# Patient Record
Sex: FEMALE | Race: WHITE | NOT HISPANIC OR LATINO | Employment: FULL TIME | URBAN - METROPOLITAN AREA
[De-identification: names, ages, dates, MRNs, and addresses within clinical notes are randomized per-mention and may not be internally consistent; named-entity substitution may affect disease eponyms.]

---

## 2023-04-13 ENCOUNTER — TELEPHONE (OUTPATIENT)
Dept: PRIMARY CARE | Facility: CLINIC | Age: 63
End: 2023-04-13
Payer: COMMERCIAL

## 2023-04-13 NOTE — TELEPHONE ENCOUNTER
Patient had an referral for Colonoscopy that . She also has a Cologuard Kit that she never sent in. Do you have a preference for which one she should do?

## 2023-04-14 NOTE — TELEPHONE ENCOUNTER
Patient is concerned with rapid weight loss (12 lbs in a month) , orange colored urine, and loss of appetite/nausea. She has a physical scheduled for 5.04.23, but would like to know if you think she should come in sooner.

## 2023-04-20 ENCOUNTER — LAB (OUTPATIENT)
Dept: LAB | Facility: LAB | Age: 63
End: 2023-04-20
Payer: COMMERCIAL

## 2023-04-20 ENCOUNTER — OFFICE VISIT (OUTPATIENT)
Dept: PRIMARY CARE | Facility: CLINIC | Age: 63
End: 2023-04-20
Payer: COMMERCIAL

## 2023-04-20 VITALS
TEMPERATURE: 97.7 F | OXYGEN SATURATION: 97 % | WEIGHT: 197 LBS | DIASTOLIC BLOOD PRESSURE: 90 MMHG | HEIGHT: 68 IN | BODY MASS INDEX: 29.86 KG/M2 | HEART RATE: 72 BPM | SYSTOLIC BLOOD PRESSURE: 134 MMHG

## 2023-04-20 DIAGNOSIS — R63.4 WEIGHT LOSS, UNINTENTIONAL: ICD-10-CM

## 2023-04-20 DIAGNOSIS — R10.11 RUQ ABDOMINAL PAIN: ICD-10-CM

## 2023-04-20 DIAGNOSIS — R73.01 IMPAIRED FASTING GLUCOSE: ICD-10-CM

## 2023-04-20 DIAGNOSIS — E03.9 ACQUIRED HYPOTHYROIDISM: ICD-10-CM

## 2023-04-20 DIAGNOSIS — R39.89 ABNORMAL URINE COLOR: Primary | ICD-10-CM

## 2023-04-20 PROBLEM — F41.1 GENERALIZED ANXIETY DISORDER: Status: ACTIVE | Noted: 2023-04-20

## 2023-04-20 PROBLEM — F90.9 ADHD (ATTENTION DEFICIT HYPERACTIVITY DISORDER): Status: ACTIVE | Noted: 2023-04-20

## 2023-04-20 PROBLEM — E55.9 VITAMIN D DEFICIENCY: Status: ACTIVE | Noted: 2023-04-20

## 2023-04-20 PROBLEM — G47.00 INSOMNIA: Status: ACTIVE | Noted: 2023-04-20

## 2023-04-20 PROBLEM — R41.3 MEMORY DEFICITS: Status: ACTIVE | Noted: 2023-04-20

## 2023-04-20 PROBLEM — F33.9 DEPRESSION, RECURRENT (CMS-HCC): Status: ACTIVE | Noted: 2023-04-20

## 2023-04-20 PROBLEM — M17.12 OSTEOARTHRITIS OF LEFT PATELLOFEMORAL JOINT: Status: ACTIVE | Noted: 2023-04-20

## 2023-04-20 PROBLEM — Z98.84 S/P BARIATRIC SURGERY: Status: ACTIVE | Noted: 2023-04-20

## 2023-04-20 PROBLEM — M25.511 PAIN IN JOINT OF RIGHT SHOULDER: Status: ACTIVE | Noted: 2023-04-20

## 2023-04-20 PROBLEM — M54.41 RIGHT-SIDED LOW BACK PAIN WITH SCIATICA: Status: ACTIVE | Noted: 2023-04-20

## 2023-04-20 PROBLEM — K21.9 GERD (GASTROESOPHAGEAL REFLUX DISEASE): Status: ACTIVE | Noted: 2023-04-20

## 2023-04-20 PROBLEM — E78.5 HYPERLIPIDEMIA: Status: ACTIVE | Noted: 2023-04-20

## 2023-04-20 PROBLEM — M65.30 TRIGGER FINGER, ACQUIRED: Status: ACTIVE | Noted: 2023-04-20

## 2023-04-20 LAB
ALANINE AMINOTRANSFERASE (SGPT) (U/L) IN SER/PLAS: 114 U/L (ref 7–45)
ALBUMIN (G/DL) IN SER/PLAS: 4.4 G/DL (ref 3.4–5)
ALKALINE PHOSPHATASE (U/L) IN SER/PLAS: 315 U/L (ref 33–136)
ANION GAP IN SER/PLAS: 12 MMOL/L (ref 10–20)
ASPARTATE AMINOTRANSFERASE (SGOT) (U/L) IN SER/PLAS: 41 U/L (ref 9–39)
BASOPHILS (10*3/UL) IN BLOOD BY AUTOMATED COUNT: 0.06 X10E9/L (ref 0–0.1)
BASOPHILS/100 LEUKOCYTES IN BLOOD BY AUTOMATED COUNT: 0.7 % (ref 0–2)
BILIRUBIN TOTAL (MG/DL) IN SER/PLAS: 1.3 MG/DL (ref 0–1.2)
CALCIUM (MG/DL) IN SER/PLAS: 10.5 MG/DL (ref 8.6–10.6)
CARBON DIOXIDE, TOTAL (MMOL/L) IN SER/PLAS: 30 MMOL/L (ref 21–32)
CHLORIDE (MMOL/L) IN SER/PLAS: 100 MMOL/L (ref 98–107)
CREATININE (MG/DL) IN SER/PLAS: 0.68 MG/DL (ref 0.5–1.05)
EOSINOPHILS (10*3/UL) IN BLOOD BY AUTOMATED COUNT: 0.95 X10E9/L (ref 0–0.7)
EOSINOPHILS/100 LEUKOCYTES IN BLOOD BY AUTOMATED COUNT: 10.6 % (ref 0–6)
ERYTHROCYTE DISTRIBUTION WIDTH (RATIO) BY AUTOMATED COUNT: 14.9 % (ref 11.5–14.5)
ERYTHROCYTE MEAN CORPUSCULAR HEMOGLOBIN CONCENTRATION (G/DL) BY AUTOMATED: 32.1 G/DL (ref 32–36)
ERYTHROCYTE MEAN CORPUSCULAR VOLUME (FL) BY AUTOMATED COUNT: 95 FL (ref 80–100)
ERYTHROCYTES (10*6/UL) IN BLOOD BY AUTOMATED COUNT: 4.78 X10E12/L (ref 4–5.2)
GFR FEMALE: >90 ML/MIN/1.73M2
GLUCOSE (MG/DL) IN SER/PLAS: 92 MG/DL (ref 74–99)
HEMATOCRIT (%) IN BLOOD BY AUTOMATED COUNT: 45.2 % (ref 36–46)
HEMOGLOBIN (G/DL) IN BLOOD: 14.5 G/DL (ref 12–16)
IMMATURE GRANULOCYTES/100 LEUKOCYTES IN BLOOD BY AUTOMATED COUNT: 0.4 % (ref 0–0.9)
LEUKOCYTES (10*3/UL) IN BLOOD BY AUTOMATED COUNT: 9 X10E9/L (ref 4.4–11.3)
LYMPHOCYTES (10*3/UL) IN BLOOD BY AUTOMATED COUNT: 2.32 X10E9/L (ref 1.2–4.8)
LYMPHOCYTES/100 LEUKOCYTES IN BLOOD BY AUTOMATED COUNT: 25.8 % (ref 13–44)
MONOCYTES (10*3/UL) IN BLOOD BY AUTOMATED COUNT: 0.67 X10E9/L (ref 0.1–1)
MONOCYTES/100 LEUKOCYTES IN BLOOD BY AUTOMATED COUNT: 7.4 % (ref 2–10)
NEUTROPHILS (10*3/UL) IN BLOOD BY AUTOMATED COUNT: 4.96 X10E9/L (ref 1.2–7.7)
NEUTROPHILS/100 LEUKOCYTES IN BLOOD BY AUTOMATED COUNT: 55.1 % (ref 40–80)
NRBC (PER 100 WBCS) BY AUTOMATED COUNT: 0 /100 WBC (ref 0–0)
PLATELETS (10*3/UL) IN BLOOD AUTOMATED COUNT: 388 X10E9/L (ref 150–450)
POC APPEARANCE, URINE: CLEAR
POC BILIRUBIN, URINE: NEGATIVE
POC BLOOD, URINE: NEGATIVE
POC COLOR, URINE: YELLOW
POC GLUCOSE, URINE: NEGATIVE MG/DL
POC HEMOGLOBIN A1C: 5.2 % (ref 4.2–6.5)
POC KETONES, URINE: NEGATIVE MG/DL
POC LEUKOCYTES, URINE: NEGATIVE
POC NITRITE,URINE: NEGATIVE
POC PH, URINE: 7 PH
POC PROTEIN, URINE: NEGATIVE MG/DL
POC SPECIFIC GRAVITY, URINE: 1.02
POC UROBILINOGEN, URINE: 2 EU/DL
POTASSIUM (MMOL/L) IN SER/PLAS: 4.5 MMOL/L (ref 3.5–5.3)
PROTEIN TOTAL: 6.9 G/DL (ref 6.4–8.2)
SODIUM (MMOL/L) IN SER/PLAS: 137 MMOL/L (ref 136–145)
THYROTROPIN (MIU/L) IN SER/PLAS BY DETECTION LIMIT <= 0.05 MIU/L: 3.49 MIU/L (ref 0.44–3.98)
UREA NITROGEN (MG/DL) IN SER/PLAS: 9 MG/DL (ref 6–23)

## 2023-04-20 PROCEDURE — 99214 OFFICE O/P EST MOD 30 MIN: CPT | Performed by: FAMILY MEDICINE

## 2023-04-20 PROCEDURE — 85025 COMPLETE CBC W/AUTO DIFF WBC: CPT

## 2023-04-20 PROCEDURE — 83036 HEMOGLOBIN GLYCOSYLATED A1C: CPT | Performed by: FAMILY MEDICINE

## 2023-04-20 PROCEDURE — 1036F TOBACCO NON-USER: CPT | Performed by: FAMILY MEDICINE

## 2023-04-20 PROCEDURE — 36415 COLL VENOUS BLD VENIPUNCTURE: CPT

## 2023-04-20 PROCEDURE — 81003 URINALYSIS AUTO W/O SCOPE: CPT | Performed by: FAMILY MEDICINE

## 2023-04-20 PROCEDURE — 80053 COMPREHEN METABOLIC PANEL: CPT

## 2023-04-20 PROCEDURE — 84443 ASSAY THYROID STIM HORMONE: CPT

## 2023-04-20 RX ORDER — TRAZODONE HYDROCHLORIDE 100 MG/1
TABLET ORAL
COMMUNITY
Start: 2015-12-03 | End: 2024-02-19

## 2023-04-20 RX ORDER — VIT C/E/ZN/COPPR/LUTEIN/ZEAXAN 250MG-90MG
1 CAPSULE ORAL DAILY
COMMUNITY

## 2023-04-20 RX ORDER — DEXTROAMPHETAMINE SACCHARATE, AMPHETAMINE ASPARTATE MONOHYDRATE, DEXTROAMPHETAMINE SULFATE AND AMPHETAMINE SULFATE 5; 5; 5; 5 MG/1; MG/1; MG/1; MG/1
20 CAPSULE, EXTENDED RELEASE ORAL DAILY
COMMUNITY
End: 2023-11-18 | Stop reason: WASHOUT

## 2023-04-20 RX ORDER — MAGNESIUM CARB/ALUMINUM HYDROX 105-160MG
TABLET,CHEWABLE ORAL
COMMUNITY
Start: 2022-07-25

## 2023-04-20 RX ORDER — PYRIDOXINE HCL (VITAMIN B6) 25 MG
1 TABLET ORAL DAILY
COMMUNITY

## 2023-04-20 RX ORDER — PNV NO.95/FERROUS FUM/FOLIC AC 28MG-0.8MG
1 TABLET ORAL DAILY
COMMUNITY

## 2023-04-20 RX ORDER — BUPROPION HYDROCHLORIDE 150 MG/1
1 TABLET ORAL DAILY
COMMUNITY
Start: 2022-10-19 | End: 2023-10-09

## 2023-04-20 RX ORDER — LEVOTHYROXINE SODIUM 50 UG/1
1 TABLET ORAL DAILY
COMMUNITY
Start: 2022-05-26

## 2023-04-20 RX ORDER — FLUTICASONE PROPIONATE 50 MCG
2 SPRAY, SUSPENSION (ML) NASAL DAILY
COMMUNITY
Start: 2021-10-25 | End: 2024-02-02 | Stop reason: WASHOUT

## 2023-04-20 RX ORDER — OMEPRAZOLE 40 MG/1
CAPSULE, DELAYED RELEASE ORAL
COMMUNITY
Start: 2021-11-08 | End: 2023-04-20 | Stop reason: ALTCHOICE

## 2023-04-20 RX ORDER — BUPROPION HYDROCHLORIDE 300 MG/1
1 TABLET ORAL DAILY
COMMUNITY
Start: 2022-10-19 | End: 2023-10-09

## 2023-04-20 RX ORDER — SODIUM FLUORIDE 5 MG/G
GEL, DENTIFRICE DENTAL
COMMUNITY
Start: 2021-10-04

## 2023-04-20 RX ORDER — AZELASTINE 1 MG/ML
SPRAY, METERED NASAL 2 TIMES DAILY
COMMUNITY
Start: 2021-10-25 | End: 2024-01-26 | Stop reason: WASHOUT

## 2023-04-20 RX ORDER — PANTOPRAZOLE SODIUM 40 MG/1
40 TABLET, DELAYED RELEASE ORAL DAILY
Qty: 30 TABLET | Refills: 1 | Status: SHIPPED | OUTPATIENT
Start: 2023-04-20 | End: 2024-01-26 | Stop reason: WASHOUT

## 2023-04-20 RX ORDER — CLONIDINE HYDROCHLORIDE 0.2 MG/1
2 TABLET ORAL NIGHTLY
COMMUNITY
Start: 2022-03-15 | End: 2023-12-18

## 2023-04-20 ASSESSMENT — PATIENT HEALTH QUESTIONNAIRE - PHQ9
2. FEELING DOWN, DEPRESSED OR HOPELESS: NOT AT ALL
1. LITTLE INTEREST OR PLEASURE IN DOING THINGS: NOT AT ALL
SUM OF ALL RESPONSES TO PHQ9 QUESTIONS 1 AND 2: 0

## 2023-04-20 ASSESSMENT — ENCOUNTER SYMPTOMS
COUGH: 0
DEPRESSION: 0
SHORTNESS OF BREATH: 0
PALPITATIONS: 0
FATIGUE: 0
UNEXPECTED WEIGHT CHANGE: 1
LOSS OF SENSATION IN FEET: 0
ABDOMINAL PAIN: 1
OCCASIONAL FEELINGS OF UNSTEADINESS: 0

## 2023-04-20 ASSESSMENT — LIFESTYLE VARIABLES: HOW OFTEN DO YOU HAVE A DRINK CONTAINING ALCOHOL: 2-3 TIMES A WEEK

## 2023-04-20 NOTE — PROGRESS NOTES
"Subjective   Patient ID: Mandy Ramirez is a 62 y.o. female who presents for Weight Loss (Dark urine, loss of appetite ).    Mandy is here because she has not been feeling well.  She started losing weight in September and has lost almost 20 pounds.  She reports in late February she has had a decreased appetite.  She was feeling nauseated all the time.  About a month ago she developed dark urine and loose stool.  She has a history of GERD and takes omeprazole.  Over the last few days she has been feeling better.  She has had dry mouth and has been drinking a lot of water.          Review of Systems   Constitutional:  Positive for unexpected weight change. Negative for fatigue.   Respiratory:  Negative for cough and shortness of breath.    Cardiovascular:  Negative for chest pain and palpitations.   Gastrointestinal:  Positive for abdominal pain (diffuse).       Objective     /90   Pulse 72   Temp 36.5 °C (97.7 °F)   Ht 1.727 m (5' 8\")   Wt 89.4 kg (197 lb)   SpO2 97%   BMI 29.95 kg/m²     Physical Exam  Constitutional:       General: She is not in acute distress.  Neck:      Thyroid: No thyromegaly.   Cardiovascular:      Rate and Rhythm: Normal rate and regular rhythm.      Heart sounds: No murmur heard.  Pulmonary:      Effort: No respiratory distress.      Breath sounds: Normal breath sounds.   Abdominal:      General: There is no distension.      Palpations: There is no mass.      Tenderness: There is abdominal tenderness (Diffuse but mostly right upper quadrant). There is no guarding or rebound.   Lymphadenopathy:      Cervical: No cervical adenopathy.   Neurological:      Mental Status: She is alert.             Assessment/Plan   Problem List Items Addressed This Visit          Endocrine/Metabolic    Acquired hypothyroidism    Relevant Orders    Tsh With Reflex To Free T4 If Abnormal    Impaired fasting glucose    Relevant Orders    POCT glycosylated hemoglobin (Hb A1C) manually resulted " (Completed)     Other Visit Diagnoses       Abnormal urine color    -  Primary    Relevant Orders    POCT UA Automated manually resulted (Completed)    Weight loss, unintentional        Relevant Orders    Comprehensive metabolic panel    CBC and Auto Differential    RUQ abdominal pain        Relevant Medications    pantoprazole (ProtoNix) 40 mg EC tablet    Other Relevant Orders    US abdomen limited liver

## 2023-04-24 ENCOUNTER — TELEPHONE (OUTPATIENT)
Dept: PRIMARY CARE | Facility: CLINIC | Age: 63
End: 2023-04-24
Payer: COMMERCIAL

## 2023-04-24 DIAGNOSIS — K80.20 GALLSTONES: Primary | ICD-10-CM

## 2023-04-24 NOTE — TELEPHONE ENCOUNTER
Result Communication    Resulted Orders   US abdomen limited liver    Narrative    Interpreted By:  BELEN LEWIS MD  MRN: [id number]  Patient Name: LIVAN VELAZQUEZ     STUDY:  US LIVER;  4/24/2023 9:05 am     INDICATION:  Right upper quadrant abdominal pain, dark stool.     COMPARISON:  None.     ACCESSION NUMBER(S):  [id number]     ORDERING CLINICIAN:  SERGIO IGLESIAS     TECHNIQUE:  Grayscale and color Doppler ultrasound evaluation of the right upper  quadrant.     FINDINGS:  LIVER:  The right hepatic lobe measures 24.8 cm in length. Suspected nodular  hepatic contour. No focal abnormality.     GALLBLADDER:  Filled with shadowing hyperechoic gallstones. No wall thickening or  pericholecystic fluid. No focal tenderness is reported during  scanning directly over the gallbladder.     BILIARY TREE:  No intra- or extrahepatic biliary dilatation. The extrahepatic bile  duct measures 8 mm.     PANCREAS:  The visualized head and neck are within normal limits. The body and  tail are obscured by shadowing from bowel gas.     RIGHT KIDNEY:  Normal size, no hydronephrosis.     PERITONEUM:  No ascites.       Impression    The liver is enlarged with a suspected nodular contour suggestive of  underlying chronic liver disease. Further clinical evaluation is  suggested.     The gallbladder is filled with gallstones, however no specific  findings to suggest presence of acute cholecystitis.     Mild dilation of the extrahepatic bile duct up to 8 mm;  choledocholithiasis is not excluded on the basis of this appearance.       1:14 PM      Results were successfully communicated with the patient and they acknowledged their understanding.  She must see surgery, referral placed.  Her liver enzymes are elevated which is likely related to the gallstones.

## 2023-05-02 ENCOUNTER — APPOINTMENT (OUTPATIENT)
Dept: PRIMARY CARE | Facility: CLINIC | Age: 63
End: 2023-05-02
Payer: COMMERCIAL

## 2023-05-04 ENCOUNTER — OFFICE VISIT (OUTPATIENT)
Dept: PRIMARY CARE | Facility: CLINIC | Age: 63
End: 2023-05-04
Payer: COMMERCIAL

## 2023-05-04 VITALS
BODY MASS INDEX: 30.01 KG/M2 | HEART RATE: 85 BPM | DIASTOLIC BLOOD PRESSURE: 86 MMHG | WEIGHT: 198 LBS | OXYGEN SATURATION: 94 % | SYSTOLIC BLOOD PRESSURE: 128 MMHG | TEMPERATURE: 95.7 F | HEIGHT: 68 IN

## 2023-05-04 DIAGNOSIS — Z12.11 SCREENING FOR COLORECTAL CANCER: ICD-10-CM

## 2023-05-04 DIAGNOSIS — Z00.00 HEALTHCARE MAINTENANCE: Primary | ICD-10-CM

## 2023-05-04 DIAGNOSIS — Z11.59 NEED FOR HEPATITIS C SCREENING TEST: ICD-10-CM

## 2023-05-04 DIAGNOSIS — Z12.12 SCREENING FOR COLORECTAL CANCER: ICD-10-CM

## 2023-05-04 DIAGNOSIS — Z12.31 ENCOUNTER FOR SCREENING MAMMOGRAM FOR BREAST CANCER: ICD-10-CM

## 2023-05-04 PROBLEM — R05.3 CHRONIC COUGH: Status: ACTIVE | Noted: 2023-05-04

## 2023-05-04 PROBLEM — K80.20 CHOLELITHIASIS: Status: ACTIVE | Noted: 2023-05-04

## 2023-05-04 PROCEDURE — 3074F SYST BP LT 130 MM HG: CPT | Performed by: FAMILY MEDICINE

## 2023-05-04 PROCEDURE — 1036F TOBACCO NON-USER: CPT | Performed by: FAMILY MEDICINE

## 2023-05-04 PROCEDURE — 99396 PREV VISIT EST AGE 40-64: CPT | Performed by: FAMILY MEDICINE

## 2023-05-04 PROCEDURE — 3079F DIAST BP 80-89 MM HG: CPT | Performed by: FAMILY MEDICINE

## 2023-05-04 ASSESSMENT — ENCOUNTER SYMPTOMS
VOMITING: 1
FREQUENCY: 0
COUGH: 1
SORE THROAT: 0
RHINORRHEA: 0
DIZZINESS: 0
DIARRHEA: 1
NERVOUS/ANXIOUS: 1
WEAKNESS: 0
APPETITE CHANGE: 1
BACK PAIN: 0
DYSPHORIC MOOD: 1
DYSURIA: 0
HEADACHES: 0
UNEXPECTED WEIGHT CHANGE: 0
MYALGIAS: 0
BLOOD IN STOOL: 0
FATIGUE: 0
EYE PAIN: 0
SHORTNESS OF BREATH: 0
NAUSEA: 1
NUMBNESS: 0
PALPITATIONS: 0
ABDOMINAL PAIN: 1
ARTHRALGIAS: 0

## 2023-05-04 ASSESSMENT — LIFESTYLE VARIABLES
HOW MANY STANDARD DRINKS CONTAINING ALCOHOL DO YOU HAVE ON A TYPICAL DAY: 1 OR 2
HOW OFTEN DO YOU HAVE A DRINK CONTAINING ALCOHOL: 2-3 TIMES A WEEK
AUDIT-C TOTAL SCORE: 3
HOW OFTEN DO YOU HAVE SIX OR MORE DRINKS ON ONE OCCASION: NEVER
SKIP TO QUESTIONS 9-10: 1

## 2023-05-04 ASSESSMENT — PATIENT HEALTH QUESTIONNAIRE - PHQ9
1. LITTLE INTEREST OR PLEASURE IN DOING THINGS: NOT AT ALL
SUM OF ALL RESPONSES TO PHQ9 QUESTIONS 1 & 2: 0
2. FEELING DOWN, DEPRESSED OR HOPELESS: NOT AT ALL

## 2023-05-04 NOTE — PROGRESS NOTES
"Subjective   Patient ID: Mandy Ramirez is a 62 y.o. female who presents for Annual Exam.    Harry is here for her physical.    Diet:  Healthy  Exercise:  Walking and stretches  Sleep:  OK  Stress:  High    Dentist:  Sees regularly  Eye doctor:  Sees regularly    Last Pap:  3/24/2020, normal with negative HPV  History of abnormal Pap:  Mammogram:  2022  Colorectal cancer screening:    DEXA scan:  2022, moderate fracture risk, repeat 2 years  Vaccines due?  UTD    Sexually active:  Not really    Menopause symptoms:  Occasional hot flushes  Urinary leakage:  Occasional        Review of Systems   Constitutional:  Positive for appetite change. Negative for fatigue and unexpected weight change.   HENT:  Negative for congestion, ear pain, rhinorrhea and sore throat.    Eyes:  Negative for pain and visual disturbance.   Respiratory:  Positive for cough (chronic). Negative for shortness of breath.    Cardiovascular:  Negative for chest pain and palpitations.   Gastrointestinal:  Positive for abdominal pain, diarrhea (sometimes), nausea and vomiting. Negative for blood in stool.   Genitourinary:  Negative for dysuria, frequency, vaginal bleeding and vaginal discharge.   Musculoskeletal:  Negative for arthralgias, back pain and myalgias.   Skin:  Negative for rash.   Neurological:  Negative for dizziness, weakness, numbness and headaches.   Psychiatric/Behavioral:  Positive for dysphoric mood. The patient is nervous/anxious.        Objective     /86   Pulse 85   Temp 35.4 °C (95.7 °F)   Ht 1.727 m (5' 8\")   Wt 89.8 kg (198 lb)   SpO2 94%   BMI 30.11 kg/m²     Physical Exam  Constitutional:       General: She is not in acute distress.  HENT:      Right Ear: Tympanic membrane normal.      Left Ear: Tympanic membrane normal.   Neck:      Thyroid: No thyromegaly.   Cardiovascular:      Rate and Rhythm: Normal rate and regular rhythm.      Heart sounds: No murmur heard.  Pulmonary:      Effort: No " respiratory distress.      Breath sounds: Normal breath sounds.   Chest:   Breasts:     Breasts are symmetrical.      Right: No mass, nipple discharge, skin change or tenderness.      Left: No mass, nipple discharge, skin change or tenderness.   Abdominal:      General: Bowel sounds are normal. There is no distension.      Palpations: Abdomen is soft. There is no hepatomegaly or splenomegaly.      Tenderness: There is no abdominal tenderness.   Musculoskeletal:         General: No swelling or tenderness.   Lymphadenopathy:      Cervical: No cervical adenopathy.      Upper Body:      Right upper body: No axillary adenopathy.      Left upper body: No axillary adenopathy.   Skin:     General: Skin is warm and dry.      Coloration: Skin is not jaundiced.      Findings: No rash.   Neurological:      General: No focal deficit present.      Mental Status: She is alert and oriented to person, place, and time.   Psychiatric:         Mood and Affect: Mood normal.         Behavior: Behavior normal.             Assessment/Plan   Problem List Items Addressed This Visit    None  Visit Diagnoses       Healthcare maintenance    -  Primary    Screening for colorectal cancer        Relevant Orders    Colonoscopy    Encounter for screening mammogram for breast cancer        Relevant Orders    BI mammo bilateral screening tomosynthesis    Need for hepatitis C screening test        Relevant Orders    Hepatitis C antibody

## 2023-05-04 NOTE — PATIENT INSTRUCTIONS
"Thank you for coming in to see me today, and congratulations on paying attention to staying healthy!    The single most important factor in staying healthy is eating a healthy diet.  Research has shown that the healthiest diet for humans is one rich in whole fresh plant foods.  This means most of what you eat should be fresh fruits and vegetables, whole grains, beans, nuts and seeds.  Adding meat, dairy foods and eggs does not make your food healthier (although it may make it taste better!) so you should work to minimize the amount of beef, chicken, pork, turkey, lamb, cheese and eggs you eat.  Fatty fish like salmon and tuna may be healthier alternatives.  If you would like more information please check out the website \"[place] [employer],\" and the books \"The Blue Zones\" by Doug Saucedo and \"Engine 2 Diet\" by Christopher Lai.    I don't like recommending \"exercise\" because that has unpleasant connotations of pain and being out of breath for many people.  Instead, I encourage my patients to find a way to move your body that you LOVE and would want to do even if it were bad for you!  Playing a fun sport like pickleball, doing yoga or kimberly chi, studying martial arts, learning to dance, even chasing your kids or grandkids around the backyard are great examples of activity that makes your heart healthier.  Remember, if you don't like it, don't do it!  There are plenty of fun options, pick one and try it out!  Aim for at least 30 minutes of activity that gets your heart revved up, most days per week.    We discussed some screening tests for you today, these tests are meant to find problems before they make you sick, when they are easier to treat and less likely to impact your health.  Depending on your age and stage of life they may include blood tests, a Pap test, a mammogram, a bone density test, a colonoscopy and others.  If you have questions later about these or other recommended tests please call and ask!    There are " a number of other things you can do to improve your health.  These may include things like   Increasing the amount of water you drink every day (aim for 1 oz of water for every 2 pounds of body weight, up to about 100 oz total)  Getting 7-8 oz of sleep every night  Avoiding smoking, drinking alcohol, and using recreational drugs    Stress management is also a very important component of staying healthy.  One of the most effective stress management tools is meditation.  I recommend everyone consider proper training in meditation - it isn't just sitting with your eyes closed and breathing.  You can find a training program in your area at [email or web address] or [employer]org.    An annual physical is a great measure to keep you as healthy as you possibly can be.  Good for you for getting that done!  See you next year :-)

## 2023-05-12 DIAGNOSIS — R10.11 RUQ ABDOMINAL PAIN: ICD-10-CM

## 2023-05-12 RX ORDER — PANTOPRAZOLE SODIUM 40 MG/1
TABLET, DELAYED RELEASE ORAL
Qty: 30 TABLET | Refills: 1 | OUTPATIENT
Start: 2023-05-12

## 2023-06-01 ENCOUNTER — TELEPHONE (OUTPATIENT)
Dept: PRIMARY CARE | Facility: CLINIC | Age: 63
End: 2023-06-01
Payer: COMMERCIAL

## 2023-06-01 DIAGNOSIS — R10.11 RUQ ABDOMINAL PAIN: ICD-10-CM

## 2023-06-01 LAB
ALANINE AMINOTRANSFERASE (SGPT) (U/L) IN SER/PLAS: 14 U/L (ref 7–45)
ALBUMIN (G/DL) IN SER/PLAS: 4.3 G/DL (ref 3.4–5)
ALKALINE PHOSPHATASE (U/L) IN SER/PLAS: 85 U/L (ref 33–136)
ALPHA 1 ANTITRYPSIN (MG/DL) IN SER/PLAS: 159 MG/DL (ref 84–218)
ALPHA-1 FETOPROTEIN (NG/ML) IN SER/PLAS: 7 NG/ML (ref 0–9)
ANION GAP IN SER/PLAS: 12 MMOL/L (ref 10–20)
ASPARTATE AMINOTRANSFERASE (SGOT) (U/L) IN SER/PLAS: 19 U/L (ref 9–39)
BASOPHILS (10*3/UL) IN BLOOD BY AUTOMATED COUNT: 0.05 X10E9/L (ref 0–0.1)
BASOPHILS/100 LEUKOCYTES IN BLOOD BY AUTOMATED COUNT: 0.7 % (ref 0–2)
BILIRUBIN TOTAL (MG/DL) IN SER/PLAS: 0.7 MG/DL (ref 0–1.2)
CALCIUM (MG/DL) IN SER/PLAS: 9.3 MG/DL (ref 8.6–10.3)
CARBON DIOXIDE, TOTAL (MMOL/L) IN SER/PLAS: 27 MMOL/L (ref 21–32)
CHLORIDE (MMOL/L) IN SER/PLAS: 104 MMOL/L (ref 98–107)
CREATININE (MG/DL) IN SER/PLAS: 0.85 MG/DL (ref 0.5–1.05)
DEAMIDATED GLIADIN PEPTIDE IGA: <1 U/ML (ref 0–14)
DEAMIDATED GLIADIN PEPTIDE IGG: <1 U/ML (ref 0–14)
EOSINOPHILS (10*3/UL) IN BLOOD BY AUTOMATED COUNT: 0.95 X10E9/L (ref 0–0.7)
EOSINOPHILS/100 LEUKOCYTES IN BLOOD BY AUTOMATED COUNT: 12.7 % (ref 0–6)
ERYTHROCYTE DISTRIBUTION WIDTH (RATIO) BY AUTOMATED COUNT: 14.5 % (ref 11.5–14.5)
ERYTHROCYTE MEAN CORPUSCULAR HEMOGLOBIN CONCENTRATION (G/DL) BY AUTOMATED: 33.6 G/DL (ref 32–36)
ERYTHROCYTE MEAN CORPUSCULAR VOLUME (FL) BY AUTOMATED COUNT: 92 FL (ref 80–100)
ERYTHROCYTES (10*6/UL) IN BLOOD BY AUTOMATED COUNT: 4.74 X10E12/L (ref 4–5.2)
FERRITIN (UG/LL) IN SER/PLAS: 257 UG/L (ref 8–150)
GFR FEMALE: 77 ML/MIN/1.73M2
GLUCOSE (MG/DL) IN SER/PLAS: 101 MG/DL (ref 74–99)
HEMATOCRIT (%) IN BLOOD BY AUTOMATED COUNT: 43.5 % (ref 36–46)
HEMOGLOBIN (G/DL) IN BLOOD: 14.6 G/DL (ref 12–16)
HEPATITIS A VIRUS IGM AB PRESENCE IN SER/PLAS BY IMMUNOASSAY: NONREACTIVE
HEPATITIS B VIRUS CORE IGM AB PRESENCE IN SER/PLAS BY IMMUNOASSY: NONREACTIVE
HEPATITIS B VIRUS SURFACE AG PRESENCE IN SERUM: NONREACTIVE
HEPATITIS C VIRUS AB PRESENCE IN SERUM: NONREACTIVE
HIV 1/ 2 AG/AB SCREEN: NONREACTIVE
IGA (MG/DL) IN SER/PLAS: 146 MG/DL (ref 70–400)
IGG (MG/DL) IN SER/PLAS: 848 MG/DL (ref 700–1600)
IGM (MG/DL) IN SER/PLAS: 41 MG/DL (ref 40–230)
IMMATURE GRANULOCYTES/100 LEUKOCYTES IN BLOOD BY AUTOMATED COUNT: 0.1 % (ref 0–0.9)
IRON (UG/DL) IN SER/PLAS: 60 UG/DL (ref 35–150)
IRON BINDING CAPACITY (UG/DL) IN SER/PLAS: 240 UG/DL (ref 240–445)
IRON SATURATION (%) IN SER/PLAS: 25 % (ref 25–45)
LEUKOCYTES (10*3/UL) IN BLOOD BY AUTOMATED COUNT: 7.5 X10E9/L (ref 4.4–11.3)
LYMPHOCYTES (10*3/UL) IN BLOOD BY AUTOMATED COUNT: 1.91 X10E9/L (ref 1.2–4.8)
LYMPHOCYTES/100 LEUKOCYTES IN BLOOD BY AUTOMATED COUNT: 25.6 % (ref 13–44)
MONOCYTES (10*3/UL) IN BLOOD BY AUTOMATED COUNT: 0.64 X10E9/L (ref 0.1–1)
MONOCYTES/100 LEUKOCYTES IN BLOOD BY AUTOMATED COUNT: 8.6 % (ref 2–10)
NEUTROPHILS (10*3/UL) IN BLOOD BY AUTOMATED COUNT: 3.91 X10E9/L (ref 1.2–7.7)
NEUTROPHILS/100 LEUKOCYTES IN BLOOD BY AUTOMATED COUNT: 52.3 % (ref 40–80)
NRBC (PER 100 WBCS) BY AUTOMATED COUNT: 0 /100 WBC (ref 0–0)
PLATELETS (10*3/UL) IN BLOOD AUTOMATED COUNT: 330 X10E9/L (ref 150–450)
POTASSIUM (MMOL/L) IN SER/PLAS: 3.8 MMOL/L (ref 3.5–5.3)
PROTEIN TOTAL: 6.7 G/DL (ref 6.4–8.2)
SODIUM (MMOL/L) IN SER/PLAS: 139 MMOL/L (ref 136–145)
TISSUE TRANSGLUTAMINASE IGG: <1 U/ML (ref 0–14)
TISSUE TRANSGLUTAMINASE, IGA: <1 U/ML (ref 0–14)
UREA NITROGEN (MG/DL) IN SER/PLAS: 10 MG/DL (ref 6–23)

## 2023-06-01 RX ORDER — PANTOPRAZOLE SODIUM 40 MG/1
TABLET, DELAYED RELEASE ORAL
Qty: 30 TABLET | Refills: 1 | OUTPATIENT
Start: 2023-06-01

## 2023-06-01 NOTE — TELEPHONE ENCOUNTER
----- Message from Nunu Felipe MD sent at 6/1/2023  3:48 PM EDT -----  Normal mammogram, recheck 1 year.

## 2023-06-02 LAB
ANTI-NUCLEAR ANTIBODY (ANA): NEGATIVE
ANTI-SMOOTH MUSCLE ANTIBODY: NEGATIVE

## 2023-06-07 LAB
MISCELLANEUOUS TEST RESULT: NORMAL
NAME OF SENDOUT TEST: NORMAL

## 2023-07-06 DIAGNOSIS — R10.11 RUQ ABDOMINAL PAIN: ICD-10-CM

## 2023-07-07 RX ORDER — PANTOPRAZOLE SODIUM 40 MG/1
TABLET, DELAYED RELEASE ORAL
Qty: 30 TABLET | Refills: 1 | OUTPATIENT
Start: 2023-07-07

## 2023-07-27 ENCOUNTER — HOSPITAL ENCOUNTER (OUTPATIENT)
Dept: DATA CONVERSION | Facility: HOSPITAL | Age: 63
End: 2023-07-27
Attending: STUDENT IN AN ORGANIZED HEALTH CARE EDUCATION/TRAINING PROGRAM | Admitting: STUDENT IN AN ORGANIZED HEALTH CARE EDUCATION/TRAINING PROGRAM
Payer: COMMERCIAL

## 2023-07-27 DIAGNOSIS — R13.10 DYSPHAGIA, UNSPECIFIED: ICD-10-CM

## 2023-07-27 DIAGNOSIS — Z98.0 INTESTINAL BYPASS AND ANASTOMOSIS STATUS: ICD-10-CM

## 2023-07-27 DIAGNOSIS — Z98.84 BARIATRIC SURGERY STATUS: ICD-10-CM

## 2023-07-27 DIAGNOSIS — K63.5 POLYP OF COLON: ICD-10-CM

## 2023-07-27 DIAGNOSIS — R12 HEARTBURN: ICD-10-CM

## 2023-07-27 DIAGNOSIS — K31.A19 GASTRIC INTESTINAL METAPLASIA WITHOUT DYSPLASIA, UNSPECIFIED SITE: ICD-10-CM

## 2023-07-27 DIAGNOSIS — K57.30 DIVERTICULOSIS OF LARGE INTESTINE WITHOUT PERFORATION OR ABSCESS WITHOUT BLEEDING: ICD-10-CM

## 2023-07-27 DIAGNOSIS — Z12.11 ENCOUNTER FOR SCREENING FOR MALIGNANT NEOPLASM OF COLON: ICD-10-CM

## 2023-07-27 DIAGNOSIS — R10.84 GENERALIZED ABDOMINAL PAIN: ICD-10-CM

## 2023-08-02 LAB
COMPLETE PATHOLOGY REPORT: NORMAL
CONVERTED CLINICAL DIAGNOSIS-HISTORY: NORMAL
CONVERTED FINAL DIAGNOSIS: NORMAL
CONVERTED FINAL REPORT PDF LINK TO COPY AND PASTE: NORMAL
CONVERTED GROSS DESCRIPTION: NORMAL

## 2023-08-14 ENCOUNTER — HOSPITAL ENCOUNTER (OUTPATIENT)
Dept: DATA CONVERSION | Facility: HOSPITAL | Age: 63
End: 2023-08-14
Attending: STUDENT IN AN ORGANIZED HEALTH CARE EDUCATION/TRAINING PROGRAM | Admitting: STUDENT IN AN ORGANIZED HEALTH CARE EDUCATION/TRAINING PROGRAM
Payer: COMMERCIAL

## 2023-08-14 DIAGNOSIS — R94.5 ABNORMAL RESULTS OF LIVER FUNCTION STUDIES: ICD-10-CM

## 2023-08-14 DIAGNOSIS — R79.89 OTHER SPECIFIED ABNORMAL FINDINGS OF BLOOD CHEMISTRY: ICD-10-CM

## 2023-08-14 DIAGNOSIS — K75.89 OTHER SPECIFIED INFLAMMATORY LIVER DISEASES: ICD-10-CM

## 2023-08-14 LAB
INR IN PPP BY COAGULATION ASSAY: 1 (ref 0.9–1.1)
PLATELETS (10*3/UL) IN BLOOD AUTOMATED COUNT: 312 X10E9/L (ref 150–450)
PROTHROMBIN TIME (PT) IN PPP BY COAGULATION ASSAY: 11.6 SEC (ref 9.8–12.8)

## 2023-09-04 PROBLEM — Z87.898 HISTORY OF DRY MOUTH: Status: ACTIVE | Noted: 2023-09-04

## 2023-09-04 PROBLEM — D22.62 MELANOCYTIC NEVI OF LEFT UPPER LIMB, INCLUDING SHOULDER: Status: ACTIVE | Noted: 2017-11-03

## 2023-09-04 PROBLEM — R19.5 LOOSE BOWEL MOVEMENT: Status: ACTIVE | Noted: 2023-09-04

## 2023-09-04 PROBLEM — L81.4 OTHER MELANIN HYPERPIGMENTATION: Status: ACTIVE | Noted: 2017-11-03

## 2023-09-04 PROBLEM — F33.42 RECURRENT MAJOR DEPRESSIVE DISORDER, IN FULL REMISSION (CMS-HCC): Status: ACTIVE | Noted: 2023-09-04

## 2023-09-04 PROBLEM — E66.811 CLASS 1 OBESITY DUE TO EXCESS CALORIES WITH SERIOUS COMORBIDITY AND BODY MASS INDEX (BMI) OF 33.0 TO 33.9 IN ADULT: Status: ACTIVE | Noted: 2023-09-04

## 2023-09-04 PROBLEM — E66.09 CLASS 1 OBESITY DUE TO EXCESS CALORIES WITH SERIOUS COMORBIDITY AND BODY MASS INDEX (BMI) OF 33.0 TO 33.9 IN ADULT: Status: ACTIVE | Noted: 2023-09-04

## 2023-09-04 PROBLEM — K22.70 BARRETT'S ESOPHAGUS WITHOUT DYSPLASIA: Status: ACTIVE | Noted: 2023-09-04

## 2023-09-04 PROBLEM — K83.8 DILATED CBD, ACQUIRED: Status: ACTIVE | Noted: 2023-09-04

## 2023-09-04 PROBLEM — F12.90 MARIJUANA SMOKER, EPISODIC: Status: ACTIVE | Noted: 2023-09-04

## 2023-09-04 PROBLEM — D18.01 HEMANGIOMA OF SKIN AND SUBCUTANEOUS TISSUE: Status: ACTIVE | Noted: 2017-11-03

## 2023-09-04 PROBLEM — Z98.84 S/P BARIATRIC SURGERY: Status: RESOLVED | Noted: 2023-04-20 | Resolved: 2023-09-04

## 2023-09-04 PROBLEM — L82.1 OTHER SEBORRHEIC KERATOSIS: Status: ACTIVE | Noted: 2017-11-03

## 2023-09-04 PROBLEM — F42.8 OBSESSIVE THINKING: Status: ACTIVE | Noted: 2023-09-04

## 2023-09-04 PROBLEM — D22.70 MELANOCYTIC NEVI OF LOWER LIMB, INCLUDING HIP: Status: ACTIVE | Noted: 2017-11-03

## 2023-09-04 PROBLEM — D22.39 MELANOCYTIC NEVI OF OTHER PARTS OF FACE: Status: ACTIVE | Noted: 2017-11-03

## 2023-09-04 PROBLEM — L57.3 POIKILODERMA OF CIVATTE: Status: ACTIVE | Noted: 2017-11-03

## 2023-09-04 PROBLEM — D48.5 NEOPLASM OF UNCERTAIN BEHAVIOR OF SKIN: Status: ACTIVE | Noted: 2017-11-03

## 2023-09-04 PROBLEM — D22.5 MELANOCYTIC NEVI OF TRUNK: Status: ACTIVE | Noted: 2017-11-03

## 2023-09-04 RX ORDER — AMOXICILLIN 500 MG/1
CAPSULE ORAL
COMMUNITY
Start: 2023-05-19 | End: 2024-01-26 | Stop reason: WASHOUT

## 2023-09-04 RX ORDER — SODIUM, POTASSIUM,MAG SULFATES 17.5-3.13G
SOLUTION, RECONSTITUTED, ORAL ORAL
COMMUNITY
Start: 2023-05-22 | End: 2024-01-26 | Stop reason: WASHOUT

## 2023-09-04 RX ORDER — DEXTROAMPHETAMINE SACCHARATE, AMPHETAMINE ASPARTATE, DEXTROAMPHETAMINE SULFATE AND AMPHETAMINE SULFATE 2.5; 2.5; 2.5; 2.5 MG/1; MG/1; MG/1; MG/1
1 TABLET ORAL 2 TIMES DAILY
COMMUNITY
Start: 2023-08-08 | End: 2023-10-10 | Stop reason: SDUPTHER

## 2023-09-04 RX ORDER — GLUCOSAM/CHONDRO/HERB 149/HYAL 750-100 MG
TABLET ORAL
COMMUNITY

## 2023-09-04 RX ORDER — FLUORIDE TOOTHPASTE
TOOTHPASTE DENTAL
COMMUNITY
Start: 2023-07-13

## 2023-09-04 RX ORDER — SOD SULF/POT CHLORIDE/MAG SULF 1.479 G
TABLET ORAL
COMMUNITY
Start: 2023-05-22 | End: 2024-01-26 | Stop reason: WASHOUT

## 2023-09-29 VITALS — BODY MASS INDEX: 29.74 KG/M2 | WEIGHT: 196.21 LBS | HEIGHT: 68 IN

## 2023-10-05 ENCOUNTER — LAB (OUTPATIENT)
Dept: LAB | Facility: LAB | Age: 63
End: 2023-10-05
Payer: COMMERCIAL

## 2023-10-05 DIAGNOSIS — F90.9 ATTENTION-DEFICIT HYPERACTIVITY DISORDER, UNSPECIFIED TYPE: Primary | ICD-10-CM

## 2023-10-05 LAB
AMPHETAMINES UR QL SCN: ABNORMAL
BARBITURATES UR QL SCN: ABNORMAL
BENZODIAZ UR QL SCN: ABNORMAL
BZE UR QL SCN: ABNORMAL
CANNABINOIDS UR QL SCN: ABNORMAL
FENTANYL+NORFENTANYL UR QL SCN: ABNORMAL
OPIATES UR QL SCN: ABNORMAL
OXYCODONE+OXYMORPHONE UR QL SCN: ABNORMAL
PCP UR QL SCN: ABNORMAL

## 2023-10-05 PROCEDURE — 80307 DRUG TEST PRSMV CHEM ANLYZR: CPT

## 2023-10-05 PROCEDURE — 80324 DRUG SCREEN AMPHETAMINES 1/2: CPT

## 2023-10-05 PROCEDURE — 80365 DRUG SCREENING OXYCODONE: CPT

## 2023-10-05 PROCEDURE — 80361 OPIATES 1 OR MORE: CPT

## 2023-10-08 LAB
AMPHET UR-MCNC: 2575 NG/ML
MDA UR-MCNC: <200 NG/ML
MDEA UR-MCNC: <200 NG/ML
MDMA UR-MCNC: <200 NG/ML
METHAMPHET UR-MCNC: <200 NG/ML
PHENTERMINE UR CFM-MCNC: <200 NG/ML

## 2023-10-09 LAB
AMPHET UR-MCNC: 2425 NG/ML
MDA UR-MCNC: <200 NG/ML
MDEA UR-MCNC: <200 NG/ML
MDMA UR-MCNC: <200 NG/ML
METHAMPHET UR-MCNC: <200 NG/ML
PHENTERMINE UR CFM-MCNC: <200 NG/ML

## 2023-10-10 ENCOUNTER — TELEMEDICINE (OUTPATIENT)
Dept: BEHAVIORAL HEALTH | Facility: CLINIC | Age: 63
End: 2023-10-10
Payer: COMMERCIAL

## 2023-10-10 DIAGNOSIS — F41.1 GENERALIZED ANXIETY DISORDER: ICD-10-CM

## 2023-10-10 DIAGNOSIS — F90.0 ATTENTION DEFICIT HYPERACTIVITY DISORDER (ADHD), PREDOMINANTLY INATTENTIVE TYPE: Primary | ICD-10-CM

## 2023-10-10 DIAGNOSIS — F42.8 OBSESSIVE THINKING: ICD-10-CM

## 2023-10-10 DIAGNOSIS — F33.9 DEPRESSION, RECURRENT (CMS-HCC): ICD-10-CM

## 2023-10-10 LAB
6MAM UR CFM-MCNC: <25 NG/ML
CODEINE UR CFM-MCNC: <50 NG/ML
HYDROCODONE CTO UR CFM-MCNC: <25 NG/ML
HYDROMORPHONE UR CFM-MCNC: <25 NG/ML
MORPHINE UR CFM-MCNC: 244 NG/ML
NORHYDROCODONE UR CFM-MCNC: <25 NG/ML
NOROXYCODONE UR CFM-MCNC: <25 NG/ML
OXYCODONE UR CFM-MCNC: <25 NG/ML
OXYMORPHONE UR CFM-MCNC: <25 NG/ML

## 2023-10-10 PROCEDURE — 99214 OFFICE O/P EST MOD 30 MIN: CPT | Performed by: NURSE PRACTITIONER

## 2023-10-10 RX ORDER — DEXTROAMPHETAMINE SACCHARATE, AMPHETAMINE ASPARTATE, DEXTROAMPHETAMINE SULFATE AND AMPHETAMINE SULFATE 2.5; 2.5; 2.5; 2.5 MG/1; MG/1; MG/1; MG/1
1 TABLET ORAL 2 TIMES DAILY
Qty: 60 TABLET | Refills: 0 | Status: SHIPPED | OUTPATIENT
Start: 2023-10-10 | End: 2024-01-26 | Stop reason: SDUPTHER

## 2023-10-10 RX ORDER — SERTRALINE HYDROCHLORIDE 25 MG/1
TABLET, FILM COATED ORAL
Qty: 134 TABLET | Refills: 0 | Status: SHIPPED | OUTPATIENT
Start: 2023-10-10 | End: 2024-01-05 | Stop reason: DRUGHIGH

## 2023-10-10 ASSESSMENT — PATIENT HEALTH QUESTIONNAIRE - PHQ9
4. FEELING TIRED OR HAVING LITTLE ENERGY: MORE THAN HALF THE DAYS
2. FEELING DOWN, DEPRESSED OR HOPELESS: SEVERAL DAYS
1. LITTLE INTEREST OR PLEASURE IN DOING THINGS: NOT AT ALL
5. POOR APPETITE OR OVEREATING: MORE THAN HALF THE DAYS
8. MOVING OR SPEAKING SO SLOWLY THAT OTHER PEOPLE COULD HAVE NOTICED. OR THE OPPOSITE, BEING SO FIGETY OR RESTLESS THAT YOU HAVE BEEN MOVING AROUND A LOT MORE THAN USUAL: SEVERAL DAYS
10. IF YOU CHECKED OFF ANY PROBLEMS, HOW DIFFICULT HAVE THESE PROBLEMS MADE IT FOR YOU TO DO YOUR WORK, TAKE CARE OF THINGS AT HOME, OR GET ALONG WITH OTHER PEOPLE: VERY DIFFICULT
7. TROUBLE CONCENTRATING ON THINGS, SUCH AS READING THE NEWSPAPER OR WATCHING TELEVISION: NEARLY EVERY DAY
9. THOUGHTS THAT YOU WOULD BE BETTER OFF DEAD, OR OF HURTING YOURSELF: NOT AT ALL
6. FEELING BAD ABOUT YOURSELF - OR THAT YOU ARE A FAILURE OR HAVE LET YOURSELF OR YOUR FAMILY DOWN: MORE THAN HALF THE DAYS
3. TROUBLE FALLING OR STAYING ASLEEP OR SLEEPING TOO MUCH: NOT AT ALL

## 2023-10-10 ASSESSMENT — ANXIETY QUESTIONNAIRES
7. FEELING AFRAID AS IF SOMETHING AWFUL MIGHT HAPPEN: SEVERAL DAYS
GAD7 TOTAL SCORE: 7
2. NOT BEING ABLE TO STOP OR CONTROL WORRYING: MORE THAN HALF THE DAYS
1. FEELING NERVOUS, ANXIOUS, OR ON EDGE: SEVERAL DAYS
6. BECOMING EASILY ANNOYED OR IRRITABLE: NOT AT ALL
5. BEING SO RESTLESS THAT IT IS HARD TO SIT STILL: NOT AT ALL
3. WORRYING TOO MUCH ABOUT DIFFERENT THINGS: MORE THAN HALF THE DAYS
IF YOU CHECKED OFF ANY PROBLEMS ON THIS QUESTIONNAIRE, HOW DIFFICULT HAVE THESE PROBLEMS MADE IT FOR YOU TO DO YOUR WORK, TAKE CARE OF THINGS AT HOME, OR GET ALONG WITH OTHER PEOPLE: NOT DIFFICULT AT ALL
4. TROUBLE RELAXING: SEVERAL DAYS

## 2023-10-10 NOTE — PROGRESS NOTES
"Subjective   Patient ID: Mandy Ramirez is a 63 y.o. female who presents for \"I am good.\"    “Both the patient, and family and caregivers and guardians as appropriate, were informed of the current need to conduct treatment via telephone. I have confirmed the patient’s identity via the following (minimum of three) acceptable identifiers as per  Policy PH-9: [place] , home address.”    Present Illness - anxiety    Characteristics/Recent psychiatric symptoms (pertinent positives and negatives) - reports feeling crying currently but 'I can't tell why. I am just upset with myself because I am such a horrible procrastinator all the time. I go into work late. I get home late. I am up late until 2am, and I go into work at 10am. I can't get myself up on time. I feel like such a fucking failure.' Reports knowing there are issues that are outside of her control - daughter's health, daughter's poor judgements/decisions (new bf who treats her well and her kids and lives with him and still has not  or sold her home). Daughter's liver disease is worse than patient's own liver disease and is avoiding her own diagnoses. 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 Reports her 's health (CLL - stage 2 symptoms but not stage 2 cancer)) is stable currently. Reports memory issues are ongoing and when she was supposed to reschedule her neuro test for this past  and was never able to do so, however she has noticed 'some days are better and other days are worse but like I saw friends recently and we all are having issues with " memory, searching for words, and I realize that other people are just like me so I have given into the fact that I am not alone.' Reports appetite is up and down. Clonidine continues to help her with attention focus but also keeps her asleep at night. Reports 6 hrs of sleep week nights and finds that is adequate and on weekends will get 7-8 hrs. Reports depression is 'fine.' Reports energy levels remain stable and only experiences mental or physical fatigue at the end of the work day. Deals with racing and obsessive thoughts, but 'that is who I am'. Denies ruminating or intrusive thoughts. Reports appetite is 'fine even though the Adderall will drop my appetite.'    Onset/timeframe - 2 months  Type - anxiety  Duration - daily  Aggravating and/or relieving factors/triggers - worrying about her daughter's health, daughter's decisions (divorce, home, moving in with nice bf but still not , work stress (impacted by memory issues), her own health issues.  Related symptoms  Treatment and treatment changes (new meds, dosage increases or decreases, med compliance, therapy frequency, etc.) (Past and Recent) - Wellbtutrin XL 450mg QD, Trazodone 100mg @HS/PRN. Adderall IR 10mg BID, Clonidine 0.2mg @HS (takes 2 tablets). Seeing Mo Townsend for therapy.    Issues: Denies SI/HI/AVH, currently.    IMPRESSION:    (Appt conducted via ZOOM and patient gave verbal permission to have appt. via ZOOM. Total length of call was 30 minutes.)     Here for her sixteenth followup appt. Calm, but anxious if not sad and at times tearful. Reports her anxiety is heightened even more so d/t multiple personal health issues impacting her, working full time, worrying for her daughter's divorce and her own health issues, and is not focused on her own self care. Feels current medications are the right mix for her ADHD, depression, anxiety and sleep. Only taking 1 of the Clonidine as 2 tabs left her overly groggy and dizzy during the daytime.  SATISH-7/PHQ-9 scores reviewed: 7, 11 compared to 9, 8 reflecting mild improvement in anxiety but increased depression. Reviewed and agreed to starting low dose Zoloft but leave other meds/tx plan in place. I have personally reviewed the drug screen and the results are consistent with the patient taking her Adderall as prescribed. She does test positive for Morphine for PRN pain management.    “In light of the current pandemic related to Coronavirus causing COVID-19 illness, and in accordance with [facility] guidelines which encourage social distancing, I have spoken with my patient via telephone. They have not had any changes to their medication, dosage, or symptomatology which would necessitate an in-person visit. I have personally reviewed the OARRS report for this patient. This report is scanned into the electronic medical record. I have considered the risks of abuse, dependence, addiction and diversion. My patient is aware that they will need a follow-up visit (either in-person or virtually) in another 30 days for continued medications. Further, my patient is aware that when this acute crisis has lifted, they will be expected to return for an in-person visit and all elements of  guidelines in order to continue this medication.”     DX: ADHD, SATISH, insomnia, THC smoker episodic, memory deficits, obsessive thinking, recurrent MDD in remission.       Goals:   Takes medications as prescribed. **ongoing**    Reports 10% improvement in anxiety. **ongoing**    Reports 10% improvement in depression. **restarting**    Reports 10% improvement and stabilization of ADHD. **achieved**    Reports 20% improvement in sleep. **achieved**    Reports 20% improvement with procrastination as it ties into her anxiety and ADD. **ongoing**    Reports 10% improvement in memory deficits. **partially achieved**    Continue to see Mo Townsend for therapy. **ongoing**    Returns for follow-up: 45 days    Medications:  Starts taking Zoloft 25mg  daily for 2 weeks, then increase to 50mg for remainder of the prescription. Clonidine 0.2mg @HS. Adderall ER 20mg QD still on hold d/t shortages - Adderall IR 10mg BID. Wellbutrin XL 450mg QD, Trazodone 100mg to 200mg @HS for sleep. Reviewed r/b/a, possible side effects of the medication. Client is aware about the benefit outweighs the risk. Adderall refill: 10/10/2023    Reviewed OARRS on 10/10/2023 by YUDITH Ferris-CNP -OARRS has been reviewed and is consistent with prescribed medications, Considered the risks of abuse, dependence, addiction and diversion, Medication is felt to be clinically appropriate based on documented diagnosis     “In light of the current pandemic related to Coronavirus causing COVID-19 illness, and in accordance with [facility] guidelines which encourage social distancing, I have spoken with my patient via telephone. They have not had any changes to their medication, dosage, or symptomatology which would necessitate an in-person visit. I have personally reviewed the OARRS report for this patient. This report is scanned into the electronic medical record. I have considered the risks of abuse, dependence, addiction and diversion. My patient is aware that they will need a follow-up visit (either in-person or virtually) in another 30 days for continued medications. Further, my patient is aware that when this acute crisis has lifted, they will be expected to return for an in-person visit and all elements of  guidelines in order to continue this medication.”     SUMMARY:    Patient is requested to schedule followup appt in 45 days from today.    Reviewed diagnostic impression, education about ADHD, SATISH, insomnia, THC smoker episodic, memory deficits, obsessive thinking, recurrent MDD in remission, etiology, treatment recommendations including medication, therapy, course of treatment and prognosis    Start taking Zoloft 25mg daily for 2 weeks, then increase to 50mg for remainder of the  prescription.     Continue taking already increased Clonidine 0.2mg at bedtime for depression and ADHD adjunct but also to aid in sleep.    Continue taking Adderall IR 10mg twice daily.    On hold: Continue Adderall ER from 20mg daily in the AM for ADHD management.     Continue taking Wellbutrin XL 450mg daily (300mg tablet and 150mg tablet per insurance).    Continue taking Trazodone 100mg to 200mg at bedtime for sleep aid.    Reviewed r/b/a, possible side effects of the medication. Client is aware about the benefit outweighs the risk.    Patient is reminded that if there is SI to call [phone] and get themselves to the closest ED for evaluation, otherwise contact me for other questions/concerns.    Continue to see Mo Townsend for therapy when possible.

## 2023-11-14 DIAGNOSIS — Z98.84 WEIGHT GAIN FOLLOWING GASTRIC BYPASS SURGERY: ICD-10-CM

## 2023-11-14 DIAGNOSIS — R63.5 WEIGHT GAIN FOLLOWING GASTRIC BYPASS SURGERY: ICD-10-CM

## 2023-11-14 DIAGNOSIS — K80.20 CALCULUS OF GALLBLADDER WITHOUT CHOLECYSTITIS WITHOUT OBSTRUCTION: Primary | ICD-10-CM

## 2023-11-18 DIAGNOSIS — F90.0 ATTENTION DEFICIT HYPERACTIVITY DISORDER (ADHD), PREDOMINANTLY INATTENTIVE TYPE: Primary | ICD-10-CM

## 2023-11-18 RX ORDER — DEXTROAMPHETAMINE SACCHARATE, AMPHETAMINE ASPARTATE, DEXTROAMPHETAMINE SULFATE AND AMPHETAMINE SULFATE 2.5; 2.5; 2.5; 2.5 MG/1; MG/1; MG/1; MG/1
10 TABLET ORAL 2 TIMES DAILY
Qty: 60 TABLET | Refills: 0 | Status: SHIPPED | OUTPATIENT
Start: 2023-12-18 | End: 2024-01-26 | Stop reason: SDUPTHER

## 2023-11-18 RX ORDER — DEXTROAMPHETAMINE SACCHARATE, AMPHETAMINE ASPARTATE, DEXTROAMPHETAMINE SULFATE AND AMPHETAMINE SULFATE 2.5; 2.5; 2.5; 2.5 MG/1; MG/1; MG/1; MG/1
10 TABLET ORAL 2 TIMES DAILY
Qty: 60 TABLET | Refills: 0 | Status: SHIPPED | OUTPATIENT
Start: 2023-11-18 | End: 2024-01-26 | Stop reason: SDUPTHER

## 2023-11-18 RX ORDER — DEXTROAMPHETAMINE SACCHARATE, AMPHETAMINE ASPARTATE, DEXTROAMPHETAMINE SULFATE AND AMPHETAMINE SULFATE 2.5; 2.5; 2.5; 2.5 MG/1; MG/1; MG/1; MG/1
10 TABLET ORAL 2 TIMES DAILY
Qty: 60 TABLET | Refills: 0 | Status: SHIPPED | OUTPATIENT
Start: 2024-01-17 | End: 2024-02-02 | Stop reason: SDUPTHER

## 2023-11-18 NOTE — PROGRESS NOTES
Patient is requesting refills on her Adderall 10mg twice daily.    Sending patient CSA to sign and return.

## 2023-12-18 ENCOUNTER — TELEPHONE (OUTPATIENT)
Dept: SURGERY | Facility: CLINIC | Age: 63
End: 2023-12-18
Payer: COMMERCIAL

## 2023-12-18 PROBLEM — E66.09 CLASS 1 OBESITY DUE TO EXCESS CALORIES WITH SERIOUS COMORBIDITY AND BODY MASS INDEX (BMI) OF 33.0 TO 33.9 IN ADULT: Status: RESOLVED | Noted: 2023-09-04 | Resolved: 2023-12-18

## 2023-12-18 PROBLEM — E66.811 CLASS 1 OBESITY DUE TO EXCESS CALORIES WITH SERIOUS COMORBIDITY AND BODY MASS INDEX (BMI) OF 33.0 TO 33.9 IN ADULT: Status: RESOLVED | Noted: 2023-09-04 | Resolved: 2023-12-18

## 2023-12-18 NOTE — TELEPHONE ENCOUNTER
Spoke with patient and scheduled her for her consult with Dr. Bedoya. She has a low BMI - per Dr. Fung patient will need a revision of her RNY GBP and Cholecystectomy.  Explained in detail to patient that Dr. Bedoya will review her records and if he feels we need to proceed with a revision of her bypass, we will have to convince her insurance she does require this which may not be possible.  Patient agreeable to a virtual visit on 12/21/23 at 2:30pm.  Scheduled.

## 2023-12-18 NOTE — PROGRESS NOTES
Subjective     Date: 12/18/2023 Time: 2:17 PM  Name: Mandy Ramirez  MRN: [id number]    This is a 63 y.o. female with morbid obesity (There is no height or weight on file to calculate BMI.) who presents to clinic for consideration of bariatric surgery.     HPI Appreciated from referring provider Dr. Jhonathan Fung:  This is a 62-year-old female who is presenting for evaluation of possible liver disease. She does have some degree of mild liver inflammation however this should not be a barrier to any surgical or endoscopic interventions. As such she would be a good candidate to proceed forward with a cholecystectomy for the management of cholelithiasis.     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 This was discussed with her as well as her risk factors of progression with dysplasia and development of esophageal cancer. We discussed that her Thelma-en-Y anatomy reduces the risk of her Beltran's esophagus progressing but does not eliminate it.     We also discussed that she does have a dilated gastric pouch and jejunal anastomosis, and may benefit from revision of her anatomy. She is interested in endoscopic or surgical revision. Considering she is in need of a cholecystectomy, she may be a good candidate for both surgical Thelma-en-Y bypass revision and cholecystectomy simultaneously. We will discuss this further with our bariatric surgery team.       Endoscopy:   Findings:       The esophagus and gastroesophageal junction were examined with white        light from a forward view and retroflexed position. There were        esophageal mucosal changes suggestive of long-segment Beltran's        esophagus, classified as Beltran's stage C7-M7 per [place] criteria.        These changes involved the mucosa at the upper extent of the gastric        folds (34 cm from the incisors) extending to the Z-line (27 cm from the        incisors). Hiatal narrowing was identified at 34  cm. The maximum        longitudinal extent of these esophageal mucosal changes was 7 cm in        length. Mucosa was biopsied with a cold forceps for histology in 4        quadrants at intervals of 2 cm from 28 to 34 cm from the incisors. A        total of 4 specimen bottles were sent to pathology.       The gastroesophageal flap valve was visualized endoscopically and        classified as Hill Grade III (minimal fold, loose to endoscope, hiatal        hernia likely).       Evidence of a Thelma-en-Y gastrojejunostomy was found. The gastrojejunal        anastomosis was characterized by significant dilation to roughly 35 mm.        This was traversed. The pouch-to-jejunum limb was characterized by        healthy appearing mucosa however was elongated, measuring 6 cm (GJ at        40cm, GE at 34). The gastroscope was able to retroflex within the pouch        with ease. The jejunojejunal anastomosis was characterized by healthy        appearing mucosa. The duodenum-to-jejunum limb was not examined as it        could not be reached. The excluded stomach was not examined as it could        not be reached.       The examined jejunum was normal. The candy cane limb measured 5 cm.  Estimated Blood Loss:       Estimated blood loss: none.  Impression:            - Esophageal mucosal changes suggestive of                          long-segment Beltran's esophagus, classified as                          Beltran's stage C7-M7 per [place] criteria. Biopsied to                          confirm Beltran's esophagus and dysplasia.                         - Gastroesophageal flap valve classified as Hill Grade                          III (minimal fold, loose to endoscope, hiatal hernia                          likely).                         - Thelma-en-Y gastrojejunostomy with dilated and                          enlarged pouch and GJ anastomosis. This would likely                          benefit from revision either surgically or                           endoscopically depending upon patient's ongoing                          symptoms and clinical goals.     Comorbidities: high cholesterol  Patient Active Problem List   Diagnosis    Acquired hypothyroidism    ADHD (attention deficit hyperactivity disorder)    Depression, recurrent (CMS/HCC)    Generalized anxiety disorder    GERD (gastroesophageal reflux disease)    Hyperlipidemia    Impaired fasting glucose    Insomnia    Memory deficits    Osteoarthritis of left patellofemoral joint    Pain in joint of right shoulder    Right-sided low back pain with sciatica    S/P gastric bypass    Trigger finger, acquired    Vitamin D deficiency    Cholelithiasis    Chronic cough    Essential hypertension, benign    Beltran's esophagus without dysplasia    Hemangioma of skin and subcutaneous tissue    Dilated cbd, acquired    Marijuana smoker, episodic    Melanocytic nevi of trunk    Melanocytic nevi of other parts of face    Neoplasm of uncertain behavior of skin    Obsessive thinking    Other melanin hyperpigmentation    Other seborrheic keratosis    Poikiloderma of [place]    Recurrent major depressive disorder, in full remission (CMS/HCC)    Melanocytic nevi of left upper limb, including shoulder    Melanocytic nevi of lower limb, including hip    Loose bowel movement    History of dry mouth       Revision     3 = Symptoms bothersome every day    PMH:   Past Medical History:   Diagnosis Date    Personal history of other diseases of the musculoskeletal system and connective tissue     History of arthritis    Personal history of other endocrine, nutritional and metabolic disease 08/30/2016    History of diabetes mellitus    Personal history of other endocrine, nutritional and metabolic disease     History of hyperlipidemia    Personal history of other infectious and parasitic diseases     History of varicella    Personal history of other infectious and parasitic diseases     History of measles    Personal  history of other infectious and parasitic diseases     History of mumps    Personal history of pneumonia (recurrent)     History of pneumonia    Unspecified visual loss     Vision loss        PSH:   Past Surgical History:   Procedure Laterality Date     SECTION, CLASSIC  2016     Section    GASTRIC BYPASS  2016    Gastric Surgery For Morbid Obesity Gastric Bypass    OTHER SURGICAL HISTORY  2020    [place] tooth extraction    TONSILLECTOMY  2016    Tonsillectomy    US GUIDED NEEDLE LIVER BIOPSY  2023    US GUIDED NEEDLE LIVER BIOPSY 2023 PAR US          no personal/family hx of VTE.    FAMILY HISTORY:  Family History   Problem Relation Name Age of Onset    Multiple sclerosis Mother      Other (ALCOHOL DEPENDENCE) Father      Heart failure Father      Diabetes Father      Other (MENTAL DEVELOPMENT) Sister      Other (MARIJUANA DEPENDENCE) Brother          SOCIAL HISTORY:  Social History     Tobacco Use    Smoking status: Never    Smokeless tobacco: Never   Substance Use Topics    Drug use: Never       MEDICATIONS:  Prior to Admission Medications:  Medication Documentation Review Audit       Reviewed by WILFRED Khan (Nurse Practitioner) on 10/10/23 at 0817      Medication Order Taking? Sig Documenting Provider Last Dose Status   aluminum hydrox-magnesium carb (Gaviscon Extra Strength) 160-105 mg tablet,chewable [id number] Yes Chew. Historical Provider, MD Taking Active   amoxicillin (Amoxil) 500 mg capsule [id number] No TAKE 2 CAPS NOW, THEN 1 CAP EVERY 6 HOURS UNTIL FINISHED Historical Provider, MD Not Taking Active   amphetamine-dextroamphetamine (Adderall) 10 mg tablet [id number] Yes Take 1 tablet (10 mg) by mouth 2 times a day. Historical Provider, MD Taking Active   amphetamine-dextroamphetamine XR (Adderall XR) 20 mg 24 hr capsule [id number] Yes Take 1 capsule (20 mg) by mouth once daily. Historical Provider, MD Taking Active   azelastine (Astelin) 137 mcg  (0.1 %) nasal spray [id number] Yes Administer into affected nostril(s) twice a day. Historical MD [place] Taking Active     Discontinued 10/09/23 1709   buPROPion XL (Wellbutrin XL) 150 mg 24 hr tablet [id number] Yes TAKE 1 TABLET BY MOUTH EVERY DAY Olivier Stokes APRN-CNP Taking Active     Discontinued 10/09/23 1709   buPROPion XL (Wellbutrin XL) 300 mg 24 hr tablet [id number] Yes TAKE 1 TABLET BY MOUTH EVERY DAY YUDITH Khan-CNP Taking Active   cholecalciferol (Vitamin D-3) 25 MCG (1000 UT) capsule [id number]  Take 1 capsule (25 mcg) by mouth once daily. Historical Provider, MD  Active   cloNIDine (Catapres) 0.2 mg tablet [id number] Yes Take 2 tablets (0.4 mg) by mouth once daily at bedtime. Jason Eisenberg MD Taking Active   cyanocobalamin (Vitamin B-12) 100 mcg tablet [id number] Yes Take 1 tablet (100 mcg) by mouth once daily. Historical MD [place] Taking Active   ferrous fumarate-vitamin C ([place]amar-[place] 65-25) [id number] Yes Take 1 tablet by mouth 3 times a day with meals. Do not crush, chew, or split. Historical MD [place] Taking Active   fish oil concentrate (Omega-3) 120-180 mg capsule [id number] No Take by mouth. Jason Eisenberg MD Not Taking Active   fluoride, sodium, 1.1 % gel [id number] Yes Apply to teeth. Jason Eisenberg MD Taking Active   fluticasone (Flonase) 50 mcg/actuation nasal spray [id number] Yes Administer 2 sprays into affected nostril(s) once daily. Jason Eisenberg MD Taking Active   levothyroxine (Synthroid, Levoxyl) 50 mcg tablet [id number] Yes Take 1 tablet (50 mcg) by mouth once daily. Jason Eisenberg MD Taking Active   moisturizing mouth (Biotene Dry Mouth Oral Rinse) solution [id number] Yes Take by mouth. Jason Eisenberg MD Taking Active   omega 3-dha-epa-fish oil (Fish OiL) 1,000 mg (120 mg-180 mg) capsule [id number] Yes Take by mouth. Jason Eisenberg MD Taking Active   pantoprazole (ProtoNix) 40 mg EC tablet [id number]  Take 1 tablet (40 mg) by mouth once  daily. Do not crush, chew, or split. Nunu Felipe MD   23 [id number]   pyridoxine (Vitamin B-6) 25 mg tablet [id number] Yes Take 1 tablet (25 mg) by mouth once daily. Historical Provider, MD Taking Active   sod sulf-pot chloride-mag sulf (Sutab) 1.479-0.188- 0.225 gram tablet [id number] Yes Take as directed Historical Provider, MD Taking Active   Suprep Bowel Prep Kit 17.5-3.13-1.6 gram recon soln solution [id number] No USE AS DIRECTED. Historical Provider, MD Not Taking Active   traZODone (Desyrel) 100 mg tablet [id number] Yes Take by mouth. Historical Provider, MD Taking Active                     ALLERGIES:  Allergies   Allergen Reactions    Egg Other and Nausea/vomiting     Gastris pain       REVIEW OF SYSTEMS:  GENERAL: Negative for malaise, significant weight loss and fever  HEAD: Negative for headache, swelling.  NECK: Negative for lumps, goiter, pain and significant neck swelling  RESPIRATORY: Negative for cough, wheezing or shortness of breath.  CARDIOVASCULAR: Negative for chest pain, leg swelling or palpitations.  GI: Negative for abdominal discomfort, blood in stools or black stools or change in bowel habits  : No history of dysuria, frequency or incontinence  MUSCULOSKELETAL: Negative for joint pain or swelling, back pain or muscle pain.  SKIN: Negative for lesions, rash, and itching.  PSYCH: Negative for sleep disturbance, mood disorder and recent psychosocial stressors.  ENDOCRINE: Negative for cold or heat intolerance, polyuria, polydipsia and goiter.    Objective   PHYSICAL EXAM:  Visit Vitals  Smoking Status Never       Assessment/Plan   IMPRESSION:  Mandy Ramirez is a 63 y.o. female with a BMI of There is no height or weight on file to calculate BMI. with the following diagnoses and co-morbidities:     Past Medical History:   Diagnosis Date    Personal history of other diseases of the musculoskeletal system and connective tissue     History of arthritis    Personal history of other  endocrine, nutritional and metabolic disease 08/30/2016    History of diabetes mellitus    Personal history of other endocrine, nutritional and metabolic disease     History of hyperlipidemia    Personal history of other infectious and parasitic diseases     History of varicella    Personal history of other infectious and parasitic diseases     History of measles    Personal history of other infectious and parasitic diseases     History of mumps    Personal history of pneumonia (recurrent)     History of pneumonia    Unspecified visual loss     Vision loss         Impression:    Patient referred by gastroenterology colleagues for hiatal hernia, Beltran's esophagus, history of gastric bypass, dilated pouch, dilated gastrojejunostomy, symptomatic cholelithiasis, chronic reflux issues, chronic abdominal pain issues    Patient has been taking NSAIDs for backache issues and was noted to have elevated liver enzymes and during the workup all these above issues were discovered.  On the endoscopy although she did not have any ulcers.    We had a long discussion about the findings and possible interventions she may benefit from.    She needs a cholecystectomy and hiatal hernia repair and may benefit from revision of her gastrojejunostomy at least endoscopically to reduce the stoma size or a surgical revision of gastrojejunostomy depending on the size of the pouch and anastomosis noted at the time of the operation.  On the previous endoscopy report jejunojejunostomy was reached on a standard gastroscopy which is rather unexpected in a gastric bypass and probably represents a smaller Thelma limb indicating a jejunojejunostomy revision may be needed to avoid reflux which the patient has been having because the amount of salmon-colored mucosa noted on her endoscopy consistent with Beltran's esophagus was remarkable reaching 6 to 7 cm proximal to the crural pinch.    To start with we will get an upper GI study, will send omeprazole  prescription for the patient to be taken every day.    Advised her to follow-up with me after the upper GI study is done.

## 2023-12-18 NOTE — PATIENT INSTRUCTIONS
You met with Dr. Bedoya to discuss possible revision of your Thelma-en-Y Gastric Bypass and possible cholecystectomy   Please follow up with any testing ordered today   Call our office with any questions [phone]

## 2023-12-21 ENCOUNTER — TELEMEDICINE (OUTPATIENT)
Dept: SURGERY | Facility: CLINIC | Age: 63
End: 2023-12-21
Payer: COMMERCIAL

## 2023-12-21 VITALS — BODY MASS INDEX: 31.4 KG/M2 | WEIGHT: 206 LBS

## 2023-12-21 DIAGNOSIS — K80.20 SYMPTOMATIC CHOLELITHIASIS: ICD-10-CM

## 2023-12-21 DIAGNOSIS — K83.8 DILATED CBD, ACQUIRED: ICD-10-CM

## 2023-12-21 DIAGNOSIS — R19.5 LOOSE BOWEL MOVEMENT: ICD-10-CM

## 2023-12-21 DIAGNOSIS — K21.9 GASTROESOPHAGEAL REFLUX DISEASE, UNSPECIFIED WHETHER ESOPHAGITIS PRESENT: Primary | ICD-10-CM

## 2023-12-21 DIAGNOSIS — K80.10 CALCULUS OF GALLBLADDER WITH CHOLECYSTITIS WITHOUT BILIARY OBSTRUCTION, UNSPECIFIED CHOLECYSTITIS ACUITY: ICD-10-CM

## 2023-12-21 DIAGNOSIS — E78.5 HYPERLIPIDEMIA, UNSPECIFIED HYPERLIPIDEMIA TYPE: ICD-10-CM

## 2023-12-21 DIAGNOSIS — K21.00 HIATAL HERNIA WITH GASTROESOPHAGEAL REFLUX DISEASE AND ESOPHAGITIS: ICD-10-CM

## 2023-12-21 DIAGNOSIS — K22.70 BARRETT'S ESOPHAGUS WITHOUT DYSPLASIA: ICD-10-CM

## 2023-12-21 DIAGNOSIS — I10 ESSENTIAL HYPERTENSION, BENIGN: ICD-10-CM

## 2023-12-21 DIAGNOSIS — K44.9 HIATAL HERNIA WITH GASTROESOPHAGEAL REFLUX DISEASE AND ESOPHAGITIS: ICD-10-CM

## 2023-12-21 DIAGNOSIS — Z98.84 S/P GASTRIC BYPASS: ICD-10-CM

## 2023-12-21 PROCEDURE — 99214 OFFICE O/P EST MOD 30 MIN: CPT | Mod: 95 | Performed by: SURGERY

## 2023-12-21 PROCEDURE — 99204 OFFICE O/P NEW MOD 45 MIN: CPT | Performed by: SURGERY

## 2023-12-21 RX ORDER — OMEPRAZOLE 40 MG/1
40 CAPSULE, DELAYED RELEASE ORAL
Qty: 90 CAPSULE | Refills: 3 | Status: SHIPPED | OUTPATIENT
Start: 2023-12-21 | End: 2024-04-04 | Stop reason: WASHOUT

## 2024-01-05 DIAGNOSIS — F33.9 DEPRESSION, RECURRENT (CMS-HCC): ICD-10-CM

## 2024-01-05 DIAGNOSIS — F41.1 GENERALIZED ANXIETY DISORDER: ICD-10-CM

## 2024-01-05 DIAGNOSIS — F42.8 OBSESSIVE THINKING: ICD-10-CM

## 2024-01-05 DIAGNOSIS — F33.9 DEPRESSION, RECURRENT (CMS-HCC): Primary | ICD-10-CM

## 2024-01-05 RX ORDER — SERTRALINE HYDROCHLORIDE 50 MG/1
50 TABLET, FILM COATED ORAL DAILY
Qty: 30 TABLET | Refills: 0 | Status: SHIPPED | OUTPATIENT
Start: 2024-01-05 | End: 2024-02-02 | Stop reason: SDUPTHER

## 2024-01-05 RX ORDER — SERTRALINE HYDROCHLORIDE 25 MG/1
TABLET, FILM COATED ORAL
Qty: 134 TABLET | Refills: 0 | OUTPATIENT
Start: 2024-01-05 | End: 2024-03-18

## 2024-01-10 ENCOUNTER — HOSPITAL ENCOUNTER (OUTPATIENT)
Dept: RADIOLOGY | Facility: HOSPITAL | Age: 64
Discharge: HOME | End: 2024-01-10
Payer: COMMERCIAL

## 2024-01-10 DIAGNOSIS — Z98.84 S/P GASTRIC BYPASS: ICD-10-CM

## 2024-01-10 DIAGNOSIS — K22.70 BARRETT'S ESOPHAGUS WITHOUT DYSPLASIA: ICD-10-CM

## 2024-01-10 DIAGNOSIS — K21.9 GASTROESOPHAGEAL REFLUX DISEASE, UNSPECIFIED WHETHER ESOPHAGITIS PRESENT: ICD-10-CM

## 2024-01-10 PROCEDURE — A9698 NON-RAD CONTRAST MATERIALNOC: HCPCS | Performed by: SURGERY

## 2024-01-10 PROCEDURE — 74240 X-RAY XM UPR GI TRC 1CNTRST: CPT

## 2024-01-10 PROCEDURE — 74240 X-RAY XM UPR GI TRC 1CNTRST: CPT | Performed by: STUDENT IN AN ORGANIZED HEALTH CARE EDUCATION/TRAINING PROGRAM

## 2024-01-10 PROCEDURE — 3430000001 HC RX 343 DIAGNOSTIC RADIOPHARMACEUTICALS: Performed by: SURGERY

## 2024-01-10 RX ADMIN — BARIUM SULFATE 115 ML: 980 POWDER, FOR SUSPENSION ORAL at 09:09

## 2024-01-12 NOTE — PROGRESS NOTES
[facility]ERY CLINIC  FOLLOW UP NOTE      Name: Mandy Ramirez  MRN: [id number]      Index Surgery  Date of Surgery: 2016   Surgeon: Outside Facility    Surgical Procedure: Laparoscopic marine en y gastric bypass 00550    HPI:   63-year-old patient presents for follow up UGI done 1/10/24.  Patient has known Beltran's Esophagus.  Cholelithiasis, reflux, hoarse voice issues.    UGI:  01/10/24  FINDINGS:  Initial  image demonstrates  no major pathology. Multiple  surgical clips in the upper abdomen related to prior Marine-en-Y  reconstruction.      A normal swallowing mechanism  without nasopharyngeal reflux or  aspiration is observed.      The esophagus was well visualized and normal without intrinsic or  extrinsic compression or obstruction. Peristalsis was  normal.      Small hiatal hernia noted.      The contrast passed freely through the gastrojejunal anastomosis  distally with no major obstruction or holdup of contrast. Dilated  gastric pouch and gastrojejunostomy.      IMPRESSION:  1. Unremarkable esophagram.  2. Status post Marine-en-Y reconstruction slightly dilated gastric  pouch and gastrojejunostomy but no major obstruction.  3. Small hiatal hernia noted.            Diet regular    Exercise walking    Concerns related to:  Nausea/Vomiting, Reflux: On omeprazole  Abdominal Pain:   Diarrhea/Constipation none    DAILY SUPPLEMENTS:  Calcium: Calcium Citrate w/ vitamin D (1200 - 1500mg)  Multivitamin & Minerals: 2 per day  Vitamin B12: 500 mcg/day   Vitamin D3: 3000 units  PPI: Yes      Current Outpatient Medications   Medication Sig Dispense Refill    aluminum hydrox-magnesium carb (Gaviscon Extra Strength) 160-105 mg tablet,chewable Chew.      amoxicillin (Amoxil) 500 mg capsule TAKE 2 CAPS NOW, THEN 1 CAP EVERY 6 HOURS UNTIL FINISHED      amphetamine-dextroamphetamine (Adderall) 10 mg tablet Take 1 tablet (10 mg) by mouth 2 times a day. 60 tablet 0    amphetamine-dextroamphetamine (Adderall) 10 mg tablet  Take 1 tablet (10 mg) by mouth 2 times a day. 60 tablet 0    amphetamine-dextroamphetamine (Adderall) 10 mg tablet Take 1 tablet (10 mg) by mouth 2 times a day. Do not start before December 18, 2023. 60 tablet 0    [START ON 1/17/2024] amphetamine-dextroamphetamine (Adderall) 10 mg tablet Take 1 tablet (10 mg) by mouth 2 times a day. Do not start before January 17, 2024. 60 tablet 0    azelastine (Astelin) 137 mcg (0.1 %) nasal spray Administer into affected nostril(s) twice a day.      buPROPion XL (Wellbutrin XL) 150 mg 24 hr tablet TAKE 1 TABLET BY MOUTH EVERY DAY 90 tablet 3    buPROPion XL (Wellbutrin XL) 300 mg 24 hr tablet TAKE 1 TABLET BY MOUTH EVERY DAY 90 tablet 3    cholecalciferol (Vitamin D-3) 25 MCG (1000 UT) capsule Take 1 capsule (25 mcg) by mouth once daily.      cloNIDine (Catapres) 0.2 mg tablet TAKE 1 TABLET BY MOUTH EVERYDAY AT BEDTIME 90 tablet 0    cyanocobalamin (Vitamin B-12) 100 mcg tablet Take 1 tablet (100 mcg) by mouth once daily.      ferrous fumarate-vitamin C ([place] 65-25) Take 1 tablet by mouth 3 times a day with meals. Do not crush, chew, or split.      fish oil concentrate (Omega-3) 120-180 mg capsule Take by mouth.      fluoride, sodium, 1.1 % gel Apply to teeth.      fluticasone (Flonase) 50 mcg/actuation nasal spray Administer 2 sprays into affected nostril(s) once daily.      levothyroxine (Synthroid, Levoxyl) 50 mcg tablet Take 1 tablet (50 mcg) by mouth once daily.      moisturizing mouth (Biotene Dry Mouth Oral Rinse) solution Take by mouth.      omega 3-dha-epa-fish oil (Fish OiL) 1,000 mg (120 mg-180 mg) capsule Take by mouth.      omeprazole (PriLOSEC) 40 mg DR capsule Take 1 capsule (40 mg) by mouth once daily in the morning. Take before meals. Do not crush or chew. 90 capsule 3    pantoprazole (ProtoNix) 40 mg EC tablet Take 1 tablet (40 mg) by mouth once daily. Do not crush, chew, or split. 30 tablet 1    pyridoxine (Vitamin B-6) 25 mg tablet Take 1 tablet (25  mg) by mouth once daily.      sertraline (Zoloft) 50 mg tablet Take 1 tablet (50 mg) by mouth once daily. 30 tablet 0    sod sulf-pot chloride-mag sulf (Sutab) 1.479-0.188- 0.225 gram tablet Take as directed      Suprep Bowel Prep Kit 17.5-3.13-1.6 gram recon soln solution USE AS DIRECTED.      traZODone (Desyrel) 100 mg tablet Take by mouth.       No current facility-administered medications for this visit.       Comorbidities:  Patient Active Problem List   Diagnosis    Acquired hypothyroidism    ADHD (attention deficit hyperactivity disorder)    Depression, recurrent (CMS/HCC)    Generalized anxiety disorder    GERD (gastroesophageal reflux disease)    Hyperlipidemia    Impaired fasting glucose    Insomnia    Memory deficits    Osteoarthritis of left patellofemoral joint    Pain in joint of right shoulder    Right-sided low back pain with sciatica    S/P gastric bypass    Trigger finger, acquired    Vitamin D deficiency    Cholelithiasis    Chronic cough    Essential hypertension, benign    Beltran's esophagus without dysplasia    Hemangioma of skin and subcutaneous tissue    Dilated cbd, acquired    Marijuana smoker, episodic    Melanocytic nevi of trunk    Melanocytic nevi of other parts of face    Neoplasm of uncertain behavior of skin    Obsessive thinking    Other melanin hyperpigmentation    Other seborrheic keratosis    Poikiloderma of [place]    Recurrent major depressive disorder, in full remission (CMS/HCC)    Melanocytic nevi of left upper limb, including shoulder    Melanocytic nevi of lower limb, including hip    Loose bowel movement    History of dry mouth         REVIEW OF SYSTEMS:  CONSTITUTIONAL: Patient denies fevers, chills,   CARDIOVASCULAR: Patient denies chest pains, palpitations, orthopnea and paroxysmal nocturnal dyspnea.  RESPIRATORY: No dyspnea on exertion, no wheezing or cough.  GI: No nausea, vomiting, diarrhea, constipation, hematochezia or melena.  MUSCULOSKELETAL: No myalgias or  arthralgias.  NEUROLOGIC: No chronic headaches, no seizures. Patient denies numbness, tingling or weakness.  PSYCHIATRIC: Patient denies problems with mood disturbance. No problems with anxiety.  ENDOCRINE: No excessive urination or excessive thirst.  DERMATOLOGIC: Patient denies any rashes or skin changes.    PHYSICAL EXAM:  There were no vitals taken for this visit.  Virtual visit ASSESSMENT & PLAN:  63-year-old patient presents for follow up UGI done 1/10/24.  Patient has known Beltran's Esophagus.    Assessment/plan    Patient with history of gastric bypass in past, was undergoing workup for elevated liver enzymes underwent MRCP, ultrasound of the liver, EGD, liver biopsy.  And upper GI    Findings so far have been consistent with cholelithiasis for which she has been recommended cholecystectomy.    Dilated pouch, hiatal hernia, Beltran's esophagus on the endoscopy no dysplasia noted.    We discussed all these above issues at length.    She needs cholecystectomy, hiatal hernia can be repaired however redoing the pouch will require revision of gastrojejunostomy and potential risk of leak and other complications as they are.    She is not interested in revising the pouch itself however is interested in making it narrow or shrinking it in her words.    Overall in light of what she has and what she needs with her expectations and everything put together I think it would be reasonable to do a cholecystectomy and hiatal hernia repair and may be doing a plication of the pouch however without revising the gastrojejunostomy to avoid significant leak risk etc.  Patient is eager to proceed with that.    She will need a cardiology clearance, we will place a referral prior to her surgical intervention and schedule her for surgery robotic assisted cholecystectomy, hiatal hernia repair and related procedures after insurance approval and cardiology clearance has been obtained.

## 2024-01-17 DIAGNOSIS — G47.00 INSOMNIA, UNSPECIFIED: ICD-10-CM

## 2024-01-17 RX ORDER — TRAZODONE HYDROCHLORIDE 100 MG/1
TABLET ORAL
Qty: 180 TABLET | Refills: 3 | OUTPATIENT
Start: 2024-01-17

## 2024-01-18 ENCOUNTER — TELEMEDICINE (OUTPATIENT)
Dept: SURGERY | Facility: CLINIC | Age: 64
End: 2024-01-18
Payer: COMMERCIAL

## 2024-01-18 DIAGNOSIS — K22.70 BARRETT'S ESOPHAGUS WITHOUT DYSPLASIA: ICD-10-CM

## 2024-01-18 DIAGNOSIS — K80.20 CALCULUS OF GALLBLADDER WITHOUT CHOLECYSTITIS WITHOUT OBSTRUCTION: ICD-10-CM

## 2024-01-18 DIAGNOSIS — K21.9 HIATAL HERNIA WITH GERD: Primary | ICD-10-CM

## 2024-01-18 DIAGNOSIS — Z98.84 S/P GASTRIC BYPASS: ICD-10-CM

## 2024-01-18 DIAGNOSIS — Z01.818 PRE-OP EVALUATION: ICD-10-CM

## 2024-01-18 DIAGNOSIS — K44.9 HIATAL HERNIA: ICD-10-CM

## 2024-01-18 DIAGNOSIS — K44.9 HIATAL HERNIA WITH GERD: Primary | ICD-10-CM

## 2024-01-18 DIAGNOSIS — K21.00 GASTROESOPHAGEAL REFLUX DISEASE WITH ESOPHAGITIS WITHOUT HEMORRHAGE: ICD-10-CM

## 2024-01-18 PROCEDURE — 99213 OFFICE O/P EST LOW 20 MIN: CPT | Mod: 95 | Performed by: SURGERY

## 2024-01-18 PROCEDURE — 99213 OFFICE O/P EST LOW 20 MIN: CPT | Performed by: SURGERY

## 2024-01-26 ENCOUNTER — OFFICE VISIT (OUTPATIENT)
Dept: CARDIOLOGY | Facility: CLINIC | Age: 64
End: 2024-01-26
Payer: COMMERCIAL

## 2024-01-26 ENCOUNTER — APPOINTMENT (OUTPATIENT)
Dept: PSYCHOLOGY | Facility: CLINIC | Age: 64
End: 2024-01-26
Payer: COMMERCIAL

## 2024-01-26 VITALS
HEIGHT: 68 IN | HEART RATE: 70 BPM | BODY MASS INDEX: 31.61 KG/M2 | DIASTOLIC BLOOD PRESSURE: 82 MMHG | WEIGHT: 208.6 LBS | SYSTOLIC BLOOD PRESSURE: 130 MMHG | OXYGEN SATURATION: 96 %

## 2024-01-26 DIAGNOSIS — Z01.818 PRE-OPERATIVE CLEARANCE: Primary | ICD-10-CM

## 2024-01-26 DIAGNOSIS — Z01.818 PRE-OP EVALUATION: ICD-10-CM

## 2024-01-26 DIAGNOSIS — Z98.84 S/P GASTRIC BYPASS: ICD-10-CM

## 2024-01-26 DIAGNOSIS — I10 ESSENTIAL HYPERTENSION, BENIGN: ICD-10-CM

## 2024-01-26 DIAGNOSIS — E78.5 HYPERLIPIDEMIA, UNSPECIFIED HYPERLIPIDEMIA TYPE: ICD-10-CM

## 2024-01-26 DIAGNOSIS — K44.9 HIATAL HERNIA: ICD-10-CM

## 2024-01-26 DIAGNOSIS — K22.70 BARRETT'S ESOPHAGUS WITHOUT DYSPLASIA: ICD-10-CM

## 2024-01-26 DIAGNOSIS — K21.9 GASTROESOPHAGEAL REFLUX DISEASE, UNSPECIFIED WHETHER ESOPHAGITIS PRESENT: ICD-10-CM

## 2024-01-26 DIAGNOSIS — K80.20 CALCULUS OF GALLBLADDER WITHOUT CHOLECYSTITIS WITHOUT OBSTRUCTION: ICD-10-CM

## 2024-01-26 PROBLEM — K75.9 HEPATITIS: Status: ACTIVE | Noted: 2023-08-14

## 2024-01-26 PROBLEM — H54.7 VISUAL IMPAIRMENT: Status: ACTIVE | Noted: 2024-01-26

## 2024-01-26 PROBLEM — R49.9 CHANGE IN VOICE: Status: RESOLVED | Noted: 2024-01-26 | Resolved: 2024-01-26

## 2024-01-26 PROBLEM — R12 HEARTBURN: Status: ACTIVE | Noted: 2023-07-27

## 2024-01-26 PROBLEM — R79.89 HIGH THYROID STIMULATING HORMONE (TSH) LEVEL: Status: ACTIVE | Noted: 2023-06-01

## 2024-01-26 PROBLEM — K31.A19 GASTRIC INTESTINAL METAPLASIA WITHOUT DYSPLASIA, UNSPECIFIED SITE: Status: ACTIVE | Noted: 2023-07-27

## 2024-01-26 PROBLEM — R49.9 CHANGE IN VOICE: Status: ACTIVE | Noted: 2024-01-26

## 2024-01-26 PROBLEM — H54.7 VISUAL IMPAIRMENT: Status: RESOLVED | Noted: 2024-01-26 | Resolved: 2024-01-26

## 2024-01-26 PROBLEM — R09.82 POSTNASAL DRIP: Status: ACTIVE | Noted: 2024-01-26

## 2024-01-26 PROBLEM — M65.30 TRIGGER FINGER, ACQUIRED: Status: RESOLVED | Noted: 2023-04-20 | Resolved: 2024-01-26

## 2024-01-26 PROBLEM — R09.82 POSTNASAL DRIP: Status: RESOLVED | Noted: 2024-01-26 | Resolved: 2024-01-26

## 2024-01-26 PROBLEM — D48.5 NEOPLASM OF UNCERTAIN BEHAVIOR OF SKIN: Status: RESOLVED | Noted: 2017-11-03 | Resolved: 2024-01-26

## 2024-01-26 PROBLEM — M70.70 ISCHIAL BURSITIS: Status: ACTIVE | Noted: 2024-01-26

## 2024-01-26 PROCEDURE — 3079F DIAST BP 80-89 MM HG: CPT | Performed by: STUDENT IN AN ORGANIZED HEALTH CARE EDUCATION/TRAINING PROGRAM

## 2024-01-26 PROCEDURE — 99204 OFFICE O/P NEW MOD 45 MIN: CPT | Performed by: STUDENT IN AN ORGANIZED HEALTH CARE EDUCATION/TRAINING PROGRAM

## 2024-01-26 PROCEDURE — 93000 ELECTROCARDIOGRAM COMPLETE: CPT | Performed by: STUDENT IN AN ORGANIZED HEALTH CARE EDUCATION/TRAINING PROGRAM

## 2024-01-26 PROCEDURE — 1036F TOBACCO NON-USER: CPT | Performed by: STUDENT IN AN ORGANIZED HEALTH CARE EDUCATION/TRAINING PROGRAM

## 2024-01-26 PROCEDURE — 3075F SYST BP GE 130 - 139MM HG: CPT | Performed by: STUDENT IN AN ORGANIZED HEALTH CARE EDUCATION/TRAINING PROGRAM

## 2024-01-26 ASSESSMENT — ENCOUNTER SYMPTOMS
PALPITATIONS: 0
HOARSE VOICE: 1
MUSCULOSKELETAL NEGATIVE: 1
HEMATOLOGIC/LYMPHATIC NEGATIVE: 1
ORTHOPNEA: 0
ENDOCRINE NEGATIVE: 1
SHORTNESS OF BREATH: 0
PSYCHIATRIC NEGATIVE: 1
PND: 0
EYES NEGATIVE: 1
NEAR-SYNCOPE: 0
CONSTITUTIONAL NEGATIVE: 1
ALLERGIC/IMMUNOLOGIC NEGATIVE: 1
COUGH: 1
DYSPNEA ON EXERTION: 0
SYNCOPE: 0
HEARTBURN: 1
NEUROLOGICAL NEGATIVE: 1

## 2024-01-26 ASSESSMENT — PAIN SCALES - GENERAL: PAINLEVEL: 4

## 2024-01-26 NOTE — PROGRESS NOTES
Cardiology New Patient History and Physical    Reason for referral: pre-op evaluation for cholecystectomy and hiatal hernia repair +/- pouch plication with Dr. Bedoya    HPI: Mandy Ramirez is a 63 y.o.  female who presents today for a pre-operative evaluation. Past medical history of obesity s/p laparoscopic marine en y gastric bypass (), HTN, Beltran's Esophagus, Cholelithiasis, GERD, hiatal hernia, hx hepatitis, hx chronic back pain with sciatica.      Patient presented to cardiology clinic on 2024 for pre-operative evaluation.  History of cholelithiasis, GERD, + Beltran's esophagus. No active cardiac complaints. Patient can complete > 4 METS without active cardiac symptoms (2-flights of stairs). No chest pains, dyspnea, orthopnea, PND, syncope, palpitations, or pedal edema.  + notes chronic back / sciatica pain; hx depression- recent death in family.  ECG (2024)- normal sinus rhythm, normal ECG.  Per patient, hx of hepatitis > ? Secondary to NSAID use; recent LFTs within normal range.  Per patient hx of heavy NSAID use for sciatica (pt now avoid NSAIDs).     Past Medical History:   - As above    Surgical History:   She has a past surgical history that includes Tonsillectomy (2016);  section, classic (2016); Gastric bypass (2016); Other surgical history (2020); and US guided needle liver biopsy (2023).    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   HENT:  Positive for hoarse voice.    Eyes: Negative.    Cardiovascular:  Negative for chest pain, dyspnea on exertion, near-syncope, orthopnea, palpitations, paroxysmal nocturnal dyspnea and syncope.   Respiratory:  Positive for cough. Negative for shortness of breath.         + chronic cough   Endocrine: Negative.    Hematologic/Lymphatic: Negative.    Skin: Negative.    Musculoskeletal: Negative.    Gastrointestinal:  Positive for heartburn.        Hx cholelithiasis    Genitourinary: Negative.    Neurological: Negative.    Psychiatric/Behavioral: Negative.     Allergic/Immunologic: Negative.        Objective     Outpatient Medications:    Current Outpatient Medications:     aluminum hydrox-magnesium carb (Gaviscon Extra Strength) 160-105 mg tablet,chewable, Chew., Disp: , Rfl:     amphetamine-dextroamphetamine (Adderall) 10 mg tablet, Take 1 tablet (10 mg) by mouth 2 times a day. Do not start before January 17, 2024., Disp: 60 tablet, Rfl: 0    buPROPion XL (Wellbutrin XL) 150 mg 24 hr tablet, TAKE 1 TABLET BY MOUTH EVERY DAY, Disp: 90 tablet, Rfl: 3    buPROPion XL (Wellbutrin XL) 300 mg 24 hr tablet, TAKE 1 TABLET BY MOUTH EVERY DAY, Disp: 90 tablet, Rfl: 3    cholecalciferol (Vitamin D-3) 25 MCG (1000 UT) capsule, Take 1 capsule (25 mcg) by mouth once daily., Disp: , Rfl:     cloNIDine (Catapres) 0.2 mg tablet, TAKE 1 TABLET BY MOUTH EVERYDAY AT BEDTIME, Disp: 90 tablet, Rfl: 0    cyanocobalamin (Vitamin B-12) 100 mcg tablet, Take 1 tablet (100 mcg) by mouth once daily., Disp: , Rfl:     ferrous fumarate-vitamin C ([place] 65-25), Take 1 tablet by mouth 3 times a day with meals. Do not crush, chew, or split., Disp: , Rfl:     fluoride, sodium, 1.1 % gel, Apply to teeth., Disp: , Rfl:     fluticasone (Flonase) 50 mcg/actuation nasal spray, Administer 2 sprays into affected nostril(s) once daily., Disp: , Rfl:      "levothyroxine (Synthroid, Levoxyl) 50 mcg tablet, Take 1 tablet (50 mcg) by mouth once daily., Disp: , Rfl:     moisturizing mouth (Biotene Dry Mouth Oral Rinse) solution, Take by mouth., Disp: , Rfl:     omega 3-dha-epa-fish oil (Fish OiL) 1,000 mg (120 mg-180 mg) capsule, Take by mouth., Disp: , Rfl:     omeprazole (PriLOSEC) 40 mg DR capsule, Take 1 capsule (40 mg) by mouth once daily in the morning. Take before meals. Do not crush or chew., Disp: 90 capsule, Rfl: 3    pyridoxine (Vitamin B-6) 25 mg tablet, Take 1 tablet (25 mg) by mouth once daily., Disp: , Rfl:     sertraline (Zoloft) 50 mg tablet, Take 1 tablet (50 mg) by mouth once daily., Disp: 30 tablet, Rfl: 0    traZODone (Desyrel) 100 mg tablet, Take by mouth., Disp: , Rfl:      Last Recorded Vitals  /82 (BP Location: Left arm, Patient Position: Sitting, BP Cuff Size: Adult)   Pulse 70   Ht 1.727 m (5' 8\")   Wt 94.6 kg (208 lb 9.6 oz)   SpO2 96%   BMI 31.72 kg/m²     Physical Exam:  Physical Exam  Constitutional:       General: She is not in acute distress.  HENT:      Head: Normocephalic.      Mouth/Throat:      Mouth: Mucous membranes are moist.      Comments: + hoarse voice  Eyes:      Extraocular Movements: Extraocular movements intact.      Conjunctiva/sclera: Conjunctivae normal.   Neck:      Vascular: No carotid bruit or JVD.   Cardiovascular:      Rate and Rhythm: Normal rate and regular rhythm.      Heart sounds: No murmur heard.  Pulmonary:      Effort: Pulmonary effort is normal. No respiratory distress.      Breath sounds: Normal breath sounds.   Abdominal:      General: Bowel sounds are normal. There is no distension.      Palpations: Abdomen is soft.   Musculoskeletal:         General: No swelling.   Skin:     General: Skin is warm and dry.   Neurological:      Mental Status: She is alert. Mental status is at baseline.      Cranial Nerves: No cranial nerve deficit.      Motor: No weakness.   Psychiatric:         Mood and Affect: " "Mood normal.         Behavior: Behavior normal.       Lab Review:    Lab Results   Component Value Date    GLUCOSE 101 (H) 06/01/2023    CALCIUM 9.3 06/01/2023     06/01/2023    K 3.8 06/01/2023    CO2 27 06/01/2023     06/01/2023    BUN 10 06/01/2023    CREATININE 0.85 06/01/2023       Lab Results   Component Value Date    WBC 7.5 06/01/2023    HGB 14.6 06/01/2023    HCT 43.5 06/01/2023    MCV 92 06/01/2023     08/14/2023       Lab Results   Component Value Date    CHOL 208 (H) 05/24/2022    CHOL 237 (H) 03/24/2020    CHOL 193 10/25/2019     Lab Results   Component Value Date    HDL 83.4 05/24/2022    HDL 63.9 03/24/2020    HDL 66.8 10/25/2019     No results found for: \"LDLCALC\"  Lab Results   Component Value Date    TRIG 135 05/24/2022    TRIG 125 03/24/2020    TRIG 149 10/25/2019       Lab Results   Component Value Date    TSH 3.49 04/20/2023       Assessment:   63 y.o.  female who presents today for a pre-operative evaluation. Past medical history of obesity s/p laparoscopic marine en y gastric bypass (2016), HTN, Beltran's Esophagus, Cholelithiasis, GERD, hiatal hernia, hx hepatitis, hx chronic back pain with sciatica.      Patient presented to cardiology clinic on 1/26/2024 for pre-operative evaluation.  History of cholelithiasis, GERD, + Beltran's esophagus. No active cardiac complaints. Patient can complete > 4 METS without active cardiac symptoms (2-flights of stairs). No chest pains, dyspnea, orthopnea, PND, syncope, palpitations, or pedal edema.     Recommend proceeding with proposed surgery without additional cardiovascular testing as patient is asymptomatic and able to perform greater than 4 METS without active complaints.  The patient is revised cardiac risk index score is 1 (type of surgery), which carries a 6% risk of any perioperative major adverse cardiac events.  Much of the patient's risk is nonmodifiable.  Recommend proceeding as above.    Overall Plan:  1.  " Preoperative evaluation for cholecystectomy and hiatal hernia repair +/- pouch plication  - Recommend proceeding with surgery as above    2.  Hypertension  - Currently well-controlled; continue clonidine    3.  GERD  - Continue PPI    4.  Dyslipidemia  - Continue dietary and lifestyle modifications    Disposition: Return to cardiology clinic as needed    Antony White MD

## 2024-01-26 NOTE — PATIENT INSTRUCTIONS
We recommend proceeding with surgery without additional heart testing.     Follow-up as needed in cardiology clinic.     Please call my nurse Letty with any questions; her contact information is below.     Thank you for your visit today. Please contact our office (via [facility] or phone) with any additional questions.     [facility] [place]    Letty, RN/Clinic Nurse for:    Dr. Christopher Ruggiero    [address], [address]  [place] [id number]    Phone: [phone] Press Option 5 then Option 3 to speak with the Clinic Nurse (Letty)    _____    To Reach:    Billing Questions -    [phone]  Scheduling / Rescheduling -  Option 1  Refills / Medication Requests -  Option 3  General Office / [place] -  Option 4  Results -     Option 6  Medical Records -    Option 7  Repeat Options -    Option 9

## 2024-01-28 DIAGNOSIS — K80.50 BILIARY COLIC: Primary | ICD-10-CM

## 2024-01-30 ENCOUNTER — OFFICE VISIT (OUTPATIENT)
Dept: PSYCHOLOGY | Facility: CLINIC | Age: 64
End: 2024-01-30
Payer: COMMERCIAL

## 2024-01-30 DIAGNOSIS — F41.9 ANXIETY AND DEPRESSION: ICD-10-CM

## 2024-01-30 DIAGNOSIS — R41.9 COGNITIVE COMPLAINTS WITH NORMAL NEUROPSYCHOLOGICAL EXAM: Primary | ICD-10-CM

## 2024-01-30 DIAGNOSIS — F32.A ANXIETY AND DEPRESSION: ICD-10-CM

## 2024-01-30 PROCEDURE — 96132 NRPSYC TST EVAL PHYS/QHP 1ST: CPT | Performed by: CLINICAL NEUROPSYCHOLOGIST

## 2024-01-30 PROCEDURE — 96116 NUBHVL XM PHYS/QHP 1ST HR: CPT | Mod: AH | Performed by: CLINICAL NEUROPSYCHOLOGIST

## 2024-01-30 PROCEDURE — 96133 NRPSYC TST EVAL PHYS/QHP EA: CPT | Performed by: CLINICAL NEUROPSYCHOLOGIST

## 2024-01-30 PROCEDURE — 96137 PSYCL/NRPSYC TST PHY/QHP EA: CPT | Performed by: CLINICAL NEUROPSYCHOLOGIST

## 2024-01-30 PROCEDURE — 1036F TOBACCO NON-USER: CPT | Performed by: CLINICAL NEUROPSYCHOLOGIST

## 2024-01-30 PROCEDURE — 96132 NRPSYC TST EVAL PHYS/QHP 1ST: CPT | Mod: AH | Performed by: CLINICAL NEUROPSYCHOLOGIST

## 2024-01-30 PROCEDURE — 96137 PSYCL/NRPSYC TST PHY/QHP EA: CPT | Mod: AH | Performed by: CLINICAL NEUROPSYCHOLOGIST

## 2024-01-30 PROCEDURE — 96133 NRPSYC TST EVAL PHYS/QHP EA: CPT | Mod: AH | Performed by: CLINICAL NEUROPSYCHOLOGIST

## 2024-01-30 PROCEDURE — 96136 PSYCL/NRPSYC TST PHY/QHP 1ST: CPT | Performed by: CLINICAL NEUROPSYCHOLOGIST

## 2024-01-30 PROCEDURE — 96116 NUBHVL XM PHYS/QHP 1ST HR: CPT | Performed by: CLINICAL NEUROPSYCHOLOGIST

## 2024-01-30 PROCEDURE — 96136 PSYCL/NRPSYC TST PHY/QHP 1ST: CPT | Mod: AH | Performed by: CLINICAL NEUROPSYCHOLOGIST

## 2024-01-31 NOTE — PROGRESS NOTES
"Neuropsychology Evaluation    Name: Mandy Ramirez  : 1960  MRN: [id number]  Referring Provider: YUDITH Ferris CNP [facility]     Date of Service: 2024     Reason for Referral: Presented for neuropsychological testing as part of ongoing medical evaluation for input on differential diagnosis of cognitive changes. This evaluation is intended for clinical purposes only and is NOT intended for legal/forensic or disability purposes. Verbal consent for neuropsychological services was obtained.     Diagnosis: Cognitive complaints with normal neuropsychological exam; anxiety, depression, possible ADD/ADHD    Summary/Impressions:   Neuropsychological testing was significant for normal limits cognitive functioning with average to above average performance across all major cognitive domains including memory, language, attention, executive functioning, and visuospatial/visuoconstruction.  Memory an area of presenting concern was above average in both the auditory/verbal and visual modalities.  Visual memory was above average. Language was above average and \"off the charts\" superior for phonemic verbal fluency. Executive functioning and visuospatial abilities were average to above average.  Assessment of mood was indicative of a moderate to severe degree of depression on self report measures despite ongoing pharmacological treatment.      Neurocognitive findings are unremarkable and not suggestive of an incipient neurodegenerative process/dementia/MCI at this time given the average to above average scores across all major cognitive domains.  Longitudinal comparisons with prior testing in 2019 indicate no decline whatsoever over a 4 year time period.  The most likely etiology for her continued cognitive concerns are anxiety, poor sleep, depression, family stressors, and possible underlying ADD/ADHD (though all attentional tests were WNL). This evaluation can serve as a comparative baseline should there be " future concerns regarding cognitive decline.      Results of the assessment were conveyed to the patient, caregiver, and/or provider within 14 days of completion.     History of Presenting Illness/Relevant Background:   The following history and presenting issue(s) was obtained through review of EMR notes detailing medical and/or neurological evaluation to date, medical history, diagnostic studies and clinical interview with patient. Patient is a 63 year old, right handed, ,  woman who presented for neuropsychological testing as part of evaluation of cognitive changes.  She had testing in 2019 that was indicative of normal limits cognitive testing with anxiety and depression the most likely etiology for cognitive concerns.  She has subsequently been followed by Psychiatry for pharmacological treatment but has continued cognitive concerns about her memory and having Alzheimer's disease and ADHD. The following is a summary of history and presenting issues:    Cognitive changes:  Reports her memory is getting worse over time Forgets names, needs visual reminders and notes, needs a calendar. She feels scattered all the time. Everything takes longer. She has problems coming up with words and uses the wrong words.     Mood/Behavioral changes:  stressed and anxious. Poor sleep. She is followed by Psychiatry with ongoing pharmacological treatment but does not think it is working.     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 Feels less efficient but no one has raised complaints. Baby sits grand children and loves it.     Comorbid medical issues:   Medical conditions potentially affecting cognition include HTN, HLP, s/p gastric bypass, ongoing issues with sciatica.   No history of alcohol or drug abuse.     Patient Active Problem List  Diagnosis   Acquired hypothyroidism   ADHD (attention deficit hyperactivity disorder)   Depression, recurrent (CMS/HCC)   Generalized anxiety disorder   GERD (gastroesophageal reflux disease)   Hyperlipidemia   Impaired fasting glucose   Insomnia   Memory deficits   Osteoarthritis of left patellofemoral joint   Pain in joint of right shoulder   Right-sided low back pain with sciatica   S/P gastric bypass   Vitamin D deficiency   Cholelithiasis   Chronic cough   Essential hypertension, benign   Beltran's esophagus without dysplasia   Hemangioma of skin and subcutaneous tissue   Dilated CBD, acquired   Marijuana smoker, episodic   Melanocytic nevi of trunk   Melanocytic nevi of other parts of face   Obsessive thinking   Other melanin hyperpigmentation   Other seborrheic keratosis   Poikiloderma of [place]   Recurrent major depressive disorder, in full remission (CMS/HCC)   Melanocytic nevi of left upper limb, including shoulder   Melanocytic nevi of lower limb, including hip   History of dry mouth   Gastric intestinal metaplasia without dysplasia, unspecified site   Hepatitis   Ischial bursitis   Pre-operative clearance    Relevant Family History: no known family history of later life dementia     Psychosocial history:  History relevant to cognitive functioning includes normal birth and early development. No early learning problems, attentional difficulties, or behavioral problems in school. Grades were As. Difficult childhood with death of mother while she was in high school. High school graduate. Occupational history includes /accounting.       Neurological/medical evaluation to  date:   Neuroimaging - MRI brain 2019 interpreted as showing mild small vessel ischemic changes. Prominence of subarachnoid space evaluated by neurosurgery and not concerning for mass effect     Patient Active Problem List  Diagnosis   Acquired hypothyroidism   ADHD (attention deficit hyperactivity disorder)   Depression, recurrent (CMS/HCC)   Generalized anxiety disorder   GERD (gastroesophageal reflux disease)   Hyperlipidemia   Impaired fasting glucose   Insomnia   Memory deficits   Osteoarthritis of left patellofemoral joint   Pain in joint of right shoulder   Right-sided low back pain with sciatica   S/P gastric bypass   Vitamin D deficiency   Cholelithiasis   Chronic cough   Essential hypertension, benign   Beltran's esophagus without dysplasia   Hemangioma of skin and subcutaneous tissue   Dilated CBD, acquired   Marijuana smoker, episodic   Melanocytic nevi of trunk   Melanocytic nevi of other parts of face   Obsessive thinking   Other melanin hyperpigmentation   Other seborrheic keratosis   Poikiloderma of [place]   Recurrent major depressive disorder, in full remission (CMS/HCC)   Melanocytic nevi of left upper limb, including shoulder   Melanocytic nevi of lower limb, including hip   History of dry mouth   Gastric intestinal metaplasia without dysplasia, unspecified site   Hepatitis   Ischial bursitis   Pre-operative clearance    Procedures/Tests:    In addition to the clinical interview, the following tests were administered:  This neuropsychological evaluation consisted of a review of available medical records, interview with the patient,  neurobehavioral examination, and the following standardized neuropsychological tests: MOCA Test; Dot Counting Test; Keiry Vocabulary Test; Reading subtest (WRAT3); Digit Span, Coding, Block Design, Information, Similarities subtests of Wechsler Adult Intelligence Scale-4th Edition (WAIS-IV); Word List Learning and Memory Test from the Alzheimers Disease Assessment  Scale-Cognitive (ADAS-Cog), CERAD version; Condon Figures Constructional Praxis Test (copy and recall), [id number] version; Logical Memory subtests of the Wechsler Memory Scale-4th Edition (WMS-IV); Shaka Complex Figure Test (copy and recall); [place] Naming Test; Category Fluency Test (animals); Controlled Oral Word Association Test (F,A,S); Trailmaking Test (Parts A and B); Stroop Color and Word Test; Key Search Test from the Behavioural Assessment of the Dysexecutive Syndrome (BADS); Line Orientation subtest of the Repeatable Battery for the Assessment of Neuropsychological Status (RBANS); Grooved Pegboard Test; PHQ9 Patient Health Questionnaire; GAD7 Generalized Anxiety Disorders questionnaire    Cognitive testing was administered and results were used to inform counseling on safety and potential patient risks.  Cognitive testing was administered and interpretation of results included consideration of appropriate and relevant cultural-linguistic and demographic factors.  Cognitive testing was administered and results of assessment informed the determination of diagnosis or further clarified etiological factors of cognitive impairment or complaints.    Behavioral Observations/Neurobehavioral Status Exam:   Unaccompanied. Late due to going to wrong building but an understandable common mistake in the testing location. Well groomed in casual attire.   Alert and oriented X3. Ambulated independently with unremarkable gait on casual inspection. Conversational speech was fluent, normal rate, prosody, and volume with a somewhat pressured quality.  Affect was full, mood was euthymic and mildly anxious.  She reported a high level of pain due to sciatica and was not sure if she would be able to focus on testing. After a mental status exam, she reported she would be fine to continue testing and did not exhibit any distractions or pain during testing session. Cooperative and friendly demeanor.  Fully engaged with testing,  good effort, steady work habits.  Adequate auditory and visual perception of test stimuli.  Valid representation of current level of cognitive functioning.      Results/Data:     Testing Interpretation Guidelines: Tests listed in Tests section were interpreted using normative data for age, education, ethnicity, and gender yielding scaled scores (Mean=10+-3), z scores (Mean=0), or T scores (Mean=50+-10).  A z score of  -1.5, a scaled score of less than 7, and/or a T score of less than 35 is suggestive of impairment.   Findings are based on the interpretation of test scores, clinical interview, and behavioral observations.  The following section provides a summary of normal/abnormal findings by cognitive domain.  A detailed list of test scores is not part of this note.     Test Results by Cognitive Domain     Orientation and mental status:  Normal    above average   Obtained a MOCA=30/30 (29 +1 high school education).  -1 sentence repetition, 5/5 delayed recall.  Fully oriented to person, time, and place. Able to name the current and past presidents, and discuss current events accurately and in depth on an international, national, and local level.     Intellectual Functioning:  Normal  estimated premorbid intellectual functioning within the above average range based on educational and occupational history, fund of general knowledge, vocabulary test correlated with overall IQ, and college reading level higher than her high school education level.     Language:  Normal  above average   Object/confrontation naming was within the high average/superior range for age and education with a score of 59/60 (T=67, Mean T=50+/-10).  Semantic/category fluency (i.e., rapidly generating exemplars to the category animals in one minute) was within the high average range (25 animals,T=64).  Phonemic/letter fluency (i.e., the ability to rapidly generate words beginning with the letters F,A,S) was within the very superior range (79 FAS  words,   T=81)! Receptive language was intact as evidenced by the ability to follow test instructions and complete simple two- and three-stage commands.     Verbal/auditory Memory:  Normal   average to above average   high average learning curve in the ability to learn a list of 10 words over 3 learning trials (5,9,10) with high average delayed recall of the word (9/10).  Learning and recall of verbal material presented in a story format, was average/high average for immediate story recall  (scaled score=12,   Mean scaled score=10 +-3) and delayed story recall was within the high average range (scaled score=13). Delayed recognition was intact.       Visual Memory:  Normal     above average   High average immediate recall of 4 geometric shapes drawn several minutes earlier (4/4).  Immediate recall of a complex visual design was superior (T=72). And delayed recall of this complex figure after a 30 minute interval was very superior (T=80+).       Attention/Executive Functioning:  Normal   average to above average  On a test of basic attention and working memory requiring the repetition of strings of numbers forwards and backwards, performance was average (Digit Span scaled score=11).  Aspects of executive functioning involving complex attention, mental flexibility, and speeded information processing, were average. Specifically, on a relatively simple task of information processing involving the sequential connection of randomly placed numbers ([place] Making Test, Part A), performance was average/high average (T=60).  On a more difficult task requiring the connection of randomly placed numbers and letters in a 1-A-2-B-3-C…etc. sequence (Trail Making Test, Part B), performance was within the average range (T=54).  Preserved ability to maintain the alternating “number/letter” set demands of the task. On a coding task, involving the speeded matching of numbers/symbols, performance was average (Coding scaled score=11).  On a  "test of executive functioning involving divided and directed attention, and filtering out of distractions, without a motor component, performance was average for the challenging task of reading incongruent color-words (e.g. the word \"red\" printed in blue ink)  (T=51).  Aspects of frontal/executive functioning involving verbal reasoning and abstraction were superior for ability to explain how 2 things were alike or related (Similarities scaled score=17)   On a nonverbal test of reasoning/judgment involving the generation of a search strategy for a lost key in a large field, score was  within the average range with a response strategy generating an efficient and systematic search pattern.    Visuospatial/Visuoconstruction:  Normal     average to above average  Copies of geometric figures of increased difficulty were precise and accurate for a [place], nicole, overlapping rectangles, and cube with an overall score within the high average range. Copy of a complex visual design was high average (>75th percentile). Spatial orientation/judgment was high average on a judgment of line orientation test (20/20)    Assessment of Mood: Abnormal  Depression screen was positive with a moderate to severe degree of depression (PHQ9=15/27).  Anxiety screen was positive with a moderate to severe degree of anxiety (GAD7=13/21). She expressed feeling her medication regimen \"not working\".  Sleep issues with no more than 5-6 hours of sleep due to waking. Uses medications and gummies but still poor sleep. No suicidal ideation.   "

## 2024-02-02 ENCOUNTER — OFFICE VISIT (OUTPATIENT)
Dept: BEHAVIORAL HEALTH | Facility: CLINIC | Age: 64
End: 2024-02-02
Payer: COMMERCIAL

## 2024-02-02 VITALS
WEIGHT: 205.3 LBS | RESPIRATION RATE: 16 BRPM | SYSTOLIC BLOOD PRESSURE: 138 MMHG | HEART RATE: 79 BPM | BODY MASS INDEX: 31.11 KG/M2 | DIASTOLIC BLOOD PRESSURE: 93 MMHG | HEIGHT: 68 IN | TEMPERATURE: 97.3 F

## 2024-02-02 DIAGNOSIS — F42.8 OBSESSIVE THINKING: ICD-10-CM

## 2024-02-02 DIAGNOSIS — F90.0 ATTENTION DEFICIT HYPERACTIVITY DISORDER (ADHD), PREDOMINANTLY INATTENTIVE TYPE: Primary | ICD-10-CM

## 2024-02-02 DIAGNOSIS — F33.2 SEVERE EPISODE OF RECURRENT MAJOR DEPRESSIVE DISORDER, WITHOUT PSYCHOTIC FEATURES (MULTI): ICD-10-CM

## 2024-02-02 DIAGNOSIS — G47.00 INSOMNIA, UNSPECIFIED TYPE: ICD-10-CM

## 2024-02-02 DIAGNOSIS — F41.1 GENERALIZED ANXIETY DISORDER: ICD-10-CM

## 2024-02-02 PROCEDURE — 99214 OFFICE O/P EST MOD 30 MIN: CPT | Performed by: NURSE PRACTITIONER

## 2024-02-02 PROCEDURE — 3080F DIAST BP >= 90 MM HG: CPT | Performed by: NURSE PRACTITIONER

## 2024-02-02 PROCEDURE — 1036F TOBACCO NON-USER: CPT | Performed by: NURSE PRACTITIONER

## 2024-02-02 PROCEDURE — 3075F SYST BP GE 130 - 139MM HG: CPT | Performed by: NURSE PRACTITIONER

## 2024-02-02 RX ORDER — OXYCODONE HYDROCHLORIDE 5 MG/1
5 TABLET ORAL EVERY 4 HOURS PRN
COMMUNITY
Start: 2024-01-29 | End: 2024-02-03

## 2024-02-02 RX ORDER — SERTRALINE HYDROCHLORIDE 100 MG/1
100 TABLET, FILM COATED ORAL DAILY
Qty: 30 TABLET | Refills: 1 | Status: SHIPPED | OUTPATIENT
Start: 2024-02-02 | End: 2024-03-29 | Stop reason: SDUPTHER

## 2024-02-02 RX ORDER — PREDNISONE 10 MG/1
10 TABLET ORAL 2 TIMES DAILY
COMMUNITY
Start: 2024-01-29 | End: 2024-02-13

## 2024-02-02 RX ORDER — DEXTROAMPHETAMINE SACCHARATE, AMPHETAMINE ASPARTATE, DEXTROAMPHETAMINE SULFATE AND AMPHETAMINE SULFATE 2.5; 2.5; 2.5; 2.5 MG/1; MG/1; MG/1; MG/1
10 TABLET ORAL 2 TIMES DAILY
Qty: 60 TABLET | Refills: 0 | Status: SHIPPED | OUTPATIENT
Start: 2024-02-02 | End: 2024-03-29

## 2024-02-02 ASSESSMENT — ANXIETY QUESTIONNAIRES
4. TROUBLE RELAXING: MORE THAN HALF THE DAYS
5. BEING SO RESTLESS THAT IT IS HARD TO SIT STILL: MORE THAN HALF THE DAYS
7. FEELING AFRAID AS IF SOMETHING AWFUL MIGHT HAPPEN: MORE THAN HALF THE DAYS
1. FEELING NERVOUS, ANXIOUS, OR ON EDGE: NEARLY EVERY DAY
2. NOT BEING ABLE TO STOP OR CONTROL WORRYING: MORE THAN HALF THE DAYS
3. WORRYING TOO MUCH ABOUT DIFFERENT THINGS: NEARLY EVERY DAY
IF YOU CHECKED OFF ANY PROBLEMS ON THIS QUESTIONNAIRE, HOW DIFFICULT HAVE THESE PROBLEMS MADE IT FOR YOU TO DO YOUR WORK, TAKE CARE OF THINGS AT HOME, OR GET ALONG WITH OTHER PEOPLE: VERY DIFFICULT
GAD7 TOTAL SCORE: 16
6. BECOMING EASILY ANNOYED OR IRRITABLE: MORE THAN HALF THE DAYS

## 2024-02-02 ASSESSMENT — PATIENT HEALTH QUESTIONNAIRE - PHQ9
2. FEELING DOWN, DEPRESSED OR HOPELESS: MORE THAN HALF THE DAYS
1. LITTLE INTEREST OR PLEASURE IN DOING THINGS: NEARLY EVERY DAY
4. FEELING TIRED OR HAVING LITTLE ENERGY: NEARLY EVERY DAY
5. POOR APPETITE OR OVEREATING: NEARLY EVERY DAY
6. FEELING BAD ABOUT YOURSELF - OR THAT YOU ARE A FAILURE OR HAVE LET YOURSELF OR YOUR FAMILY DOWN: NEARLY EVERY DAY
9. THOUGHTS THAT YOU WOULD BE BETTER OFF DEAD, OR OF HURTING YOURSELF: NOT AT ALL
3. TROUBLE FALLING OR STAYING ASLEEP OR SLEEPING TOO MUCH: NEARLY EVERY DAY
10. IF YOU CHECKED OFF ANY PROBLEMS, HOW DIFFICULT HAVE THESE PROBLEMS MADE IT FOR YOU TO DO YOUR WORK, TAKE CARE OF THINGS AT HOME, OR GET ALONG WITH OTHER PEOPLE: VERY DIFFICULT
8. MOVING OR SPEAKING SO SLOWLY THAT OTHER PEOPLE COULD HAVE NOTICED. OR THE OPPOSITE, BEING SO FIGETY OR RESTLESS THAT YOU HAVE BEEN MOVING AROUND A LOT MORE THAN USUAL: SEVERAL DAYS
7. TROUBLE CONCENTRATING ON THINGS, SUCH AS READING THE NEWSPAPER OR WATCHING TELEVISION: NEARLY EVERY DAY

## 2024-02-02 NOTE — PROGRESS NOTES
"Adult Ambulatory Psychiatry Progress Note      Assessment/Plan     Impression:  Mandy Ramirez is a 63 y.o. 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 I don't know. I don't feel like socializing with anybody. My sister  in December and that was unexpected. I feel guilty about not responding to anybody lately, and that then makes me feel guilty.\"    Plan: Reviewed and agreed that as review of neuropsychology results indicated no issues with memory loss due to dementia or aging, but more so related to multiple stressors in her life, depression, anxiety, lack of proper sleep, to increase Zoloft only, and review at next appt about reducing Wellbutrin. No other changes to medications or treatment plan.    Medication: Increases Zoloft 50mg to 100mg every day, Continues Adderall 10mg twice daily, Wellbutrin XL 450mg every day, Clonidine 0.2mg at bedtime, Trazodone 200mg at bedtime.    Reviewed r/b/a, possible side effects of the medication. Client is aware about the benefit outweighs the risk.     Diagnoses and all orders for this visit:  Attention deficit hyperactivity disorder (ADHD), predominantly inattentive type  -     amphetamine-dextroamphetamine (Adderall) 10 mg tablet; Take 1 tablet (10 mg) by mouth 2 times a day.  Generalized anxiety disorder  -     sertraline (Zoloft) 100 mg tablet; Take 1 tablet (100 mg) by mouth once daily.  Obsessive thinking  -     sertraline (Zoloft) 100 mg tablet; Take 1 tablet (100 mg) by mouth once daily.  Severe episode of recurrent major depressive disorder, without psychotic features (CMS/HCC)  -     sertraline (Zoloft) 100 mg tablet; Take 1 tablet (100 mg) by mouth once daily.  Insomnia, unspecified type      Therapy: n/a    Other: n/a    Patient is reminded that if there is SI to call [phone] and get themselves to the closest ED for evaluation, otherwise contact me " "for other questions/concerns.     Subjective   HPI:  \"Both the patient, and family and caregivers and guardians as appropriate, were informed of the current need to conduct treatment via telephone. 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 She was moved to hospice and people loved her and stayed with her so we could leave and come home. There were 3 books written about her that I didn't know about, that people loved her so much, that she touched.' Reports there is now drama since her sister's death, and having to deal with her personal accounts, her home (a 'crazy lady' whom her sister was renting her home to was not allowing anyone into the home as she was thinking now that it was her home now), and belongings. Reports frustration with not being able to be there and help get the belongings taken out, but people living in [place] are getting the items out and putting them into storage. Reports her daughter is doing 'good along with her bf and they are at the best that they can be. Since she has stopped drinking, she is no longer having bloated (ascitis) issues. However she is still not  and her house is still not sold. But my granddaughters are wonderful and doing better. And my son, his wife and I have our ups and downs and she can be judgy but she wants me to not be of service to others. I just don't honestly see enough of the grandkids, because I don't have enough " myself.' Reports frustration that her 's health (CLL - stage 2 symptoms but not stage 2 cancer) is still stable and still needs to get his disability submitted and is frustrated with herself for not getting that done and all needs to be done is get it submitted. Reports her own physical health is problematic and there have been concerns about her liver now (dx was d/t too much tylenol and ibuprofen for her back pain) and is addressing that. Now needs her gallbladder out, revision of her cardiac bypass done, Barretts esophogus is an issue again, and needs a hiatal hernia done as well. Currently dealing with lower back pain flaring up. Reports dealing with severe procrastination and had her neuropsychology appt and feels the test was too simple and feels it is not true about the results. Reports memory issues and foggy thinking continues. Reports sleep still is 6 hours and Trazodone helps knock her out and if interrupted to urinate is able to go back to sleep, without issue, but if her mind is awake, she will deal with racing, obsessive, ruminating thoughts. Reports appetite is still up and down. Still takes Clonidine nightly that with Trazodone to sleep. Admits to being on her phone for hours at bedtime to play games or [employer] or facebook and knows it is not healthy. Reports energy levels are not stable and will experience mental and physical fatigue at the end of the work day. Adderall works but needs to take the second dose closer to 3pm in order to help with focus for work, otherwise will notice herself struggling to get work done.      Onset/timeframe - 2 months  Type - anxiety, depression  Duration - daily  Aggravating and/or relieving factors/triggers - worrying about her daughter's health, daughter's decisions, 's disability status (her finishing submitting his paperwork), her doing all of the financial/balancing of the books for her work, her own health.  Related symptoms  Treatment and  treatment changes (new meds, dosage increases or decreases, med compliance, therapy frequency, etc.) (Past and Recent) - Zoloft 50mg QD, Wellbtutrin XL 450mg QD, Trazodone 200mg @[facility]. Adderall IR 10mg BID, Clonidine 0.2mg @[facility]. Seeing Mo Townsend for therapy.     Issues: Denies SI/HI/AVH, currently.        Review of Systems   Respiratory: Negative.     Gastrointestinal: Negative.    Endocrine:        Thyroid issues, hair thinning   Musculoskeletal:  Positive for arthralgias, back pain and gait problem.   Neurological:         Memory concerns       OARRS:  Olivier Stokes, APRN-CNP on 2/2/2024  3:07 PM  I have personally reviewed the OARRS report for Mandy Ramirez. I have considered the risks of abuse, dependence, addiction and diversion    Is the patient prescribed a combination of a benzodiazepine and opioid?  No    Last Urine Drug Screen / ordered today: No  Recent Results (from the past 8760 hour(s))   Opiate Confirmation, Urine    Collection Time: 10/05/23  1:16 PM   Result Value Ref Range    6-Acetylmorphine <25 <25 ng/mL    Codeine <50 <50 ng/mL    Hydrocodone <25 <25 ng/mL    Hydromorphone <25 <25 ng/mL    Morphine  244 (H) <50 ng/mL    Norhydrocodone <25 <25 ng/mL    Noroxycodone <25 <25 ng/mL    Oxycodone <25 <25 ng/mL    Oxymorphone <25 <25 ng/mL   Amphetamine Confirm, Urine    Collection Time: 10/05/23  1:16 PM   Result Value Ref Range    Methamphetamine Quant, Ur <200 ng/mL    MDA, Urine <200 ng/mL    MDEA, Urine <200 ng/mL    Phentermine,Urine <200 ng/mL    Amphetamines,Urine 2425 ng/mL    MDMA, Urine <200 ng/mL   Drug Screen, Urine With Reflex to Confirmation    Collection Time: 10/05/23  1:16 PM   Result Value Ref Range    Amphetamine Screen, Urine Presumptive Positive (A) Presumptive Negative    Barbiturate Screen, Urine Presumptive Negative Presumptive Negative    Benzodiazepines Screen, Urine Presumptive Negative Presumptive Negative    Cannabinoid Screen, Urine Presumptive Negative Presumptive  Negative    Cocaine Metabolite Screen, Urine Presumptive Negative Presumptive Negative    Fentanyl Screen, Urine Presumptive Negative Presumptive Negative    Opiate Screen, Urine Presumptive Positive (A) Presumptive Negative    Oxycodone Screen, Urine Presumptive Negative Presumptive Negative    PCP Screen, Urine Presumptive Negative Presumptive Negative     Results are as expected.         Controlled Substance Agreement:  Date of the Last Agreement: 02/03/2024  Reviewed Controlled Substance Agreement including but not limited to the benefits, risks, and alternatives to treatment with a Controlled Substance medication(s).    Stimulants:   What is the patient's goal of therapy? Stable attention  Is this being achieved with current treatment? Not all the time, but this is due to issues with depression    Activities of Daily Living:   Is your overall impression that this patient is benefiting (symptom reduction outweighs side effects) from stimulant therapy? No     1. Physical Functioning: Worse  2. Family Relationship: Worse  3. Social Relationship: Worse  4. Mood: Worse  5. Sleep Patterns: Worse  6. Overall Function: Worse      Objective   Mental Status Exam:  General Appearance: Well groomed, appropriate eye contact  Attitude/Behavior: Superficially cooperative, Distracted, Fair eye contact, Guarded  Motor: Fidgeting  Speech: Normal rate, volume, prosody  Gait/Station: Walks gingerly with a cane  Mood: 'down, overwhelmed, anxious, I don't know'  Affect: Dysphoric, constricted, Sad/Tearful, Blunted, Anxious, Congruent with mood and topic of conversation  Thought Process: Circumstantial, Perseverative, Thought blocking  Thought Associations: Mild loosening of associations, Occasional derailment  Thought Content: Tangential, Other: (comment) (too many things on her plate that she she cannot manage her thoughts and having trouble remembering things and sleep is off)  Perception: No perceptual abnormalities  noted  Sensorium: Alert and oriented to person, place, time and situation  Insight: Limited  Judgement: Fair  Cognition: Attention, Cognitively intact to conversational testing with respect to attention, orientation, fund of knowledge, recent and remote memory, and language  Testing: N/A    SATISH-7/PHQ-9 scores reviewed: 16, 21 compared to 7, 11 reflecting significant increases in anxiety and depression.     Current Medications:  Current Outpatient Medications on File Prior to Visit   Medication Sig Dispense Refill    buPROPion XL (Wellbutrin XL) 150 mg 24 hr tablet TAKE 1 TABLET BY MOUTH EVERY DAY 90 tablet 3    buPROPion XL (Wellbutrin XL) 300 mg 24 hr tablet TAKE 1 TABLET BY MOUTH EVERY DAY 90 tablet 3    cloNIDine (Catapres) 0.2 mg tablet TAKE 1 TABLET BY MOUTH EVERYDAY AT BEDTIME 90 tablet 0    oxyCODONE (Roxicodone) 5 mg immediate release tablet Take 1 tablet (5 mg) by mouth every 4 hours if needed for severe pain (7 - 10).      predniSONE (Deltasone) 10 mg tablet Take 1 tablet (10 mg) by mouth 2 times a day.      traZODone (Desyrel) 100 mg tablet Take by mouth.      [DISCONTINUED] amphetamine-dextroamphetamine (Adderall) 10 mg tablet Take 1 tablet (10 mg) by mouth 2 times a day. Do not start before January 17, 2024. 60 tablet 0    [DISCONTINUED] sertraline (Zoloft) 50 mg tablet Take 1 tablet (50 mg) by mouth once daily. 30 tablet 0    aluminum hydrox-magnesium carb (Gaviscon Extra Strength) 160-105 mg tablet,chewable Chew.      cholecalciferol (Vitamin D-3) 25 MCG (1000 UT) capsule Take 1 capsule (25 mcg) by mouth once daily.      cyanocobalamin (Vitamin B-12) 100 mcg tablet Take 1 tablet (100 mcg) by mouth once daily.      ferrous fumarate-vitamin C (Tamar-Sequeles 65-25) Take 1 tablet by mouth 3 times a day with meals. Do not crush, chew, or split.      fluoride, sodium, 1.1 % gel Apply to teeth.      levothyroxine (Synthroid, Levoxyl) 50 mcg tablet Take 1 tablet (50 mcg) by mouth once daily.       moisturizing mouth (Biotene Dry Mouth Oral Rinse) solution Take by mouth.      omega 3-dha-epa-fish oil (Fish OiL) 1,000 mg (120 mg-180 mg) capsule Take by mouth.      omeprazole (PriLOSEC) 40 mg DR capsule Take 1 capsule (40 mg) by mouth once daily in the morning. Take before meals. Do not crush or chew. 90 capsule 3    pyridoxine (Vitamin B-6) 25 mg tablet Take 1 tablet (25 mg) by mouth once daily.      [DISCONTINUED] fluticasone (Flonase) 50 mcg/actuation nasal spray Administer 2 sprays into affected nostril(s) once daily.       No current facility-administered medications on file prior to visit.       Lab Review:   Lab on 10/05/2023   Component Date Value    Amphetamine Screen, Urine 10/05/2023 Presumptive Positive (A)     Barbiturate Screen, Urine 10/05/2023 Presumptive Negative     Benzodiazepines Screen, * 10/05/2023 Presumptive Negative     Cannabinoid Screen, Urine 10/05/2023 Presumptive Negative     Cocaine Metabolite Scree* 10/05/2023 Presumptive Negative     Fentanyl Screen, Urine 10/05/2023 Presumptive Negative     Opiate Screen, Urine 10/05/2023 Presumptive Positive (A)     Oxycodone Screen, Urine 10/05/2023 Presumptive Negative     PCP Screen, Urine 10/05/2023 Presumptive Negative     Methamphetamine Quant, Ur 10/05/2023 <200     MDA, Urine 10/05/2023 <200     MDEA, Urine 10/05/2023 <200     Phentermine,Urine 10/05/2023 <200     Amphetamines,Urine 10/05/2023 2575     MDMA, Urine 10/05/2023 <200     Methamphetamine Quant, Ur 10/05/2023 <200     MDA, Urine 10/05/2023 <200     MDEA, Urine 10/05/2023 <200     Phentermine,Urine 10/05/2023 <200     Amphetamines,Urine 10/05/2023 2425     MDMA, Urine 10/05/2023 <200     6-Acetylmorphine 10/05/2023 <25     Codeine 10/05/2023 <50     Hydrocodone 10/05/2023 <25     Hydromorphone 10/05/2023 <25     Morphine  10/05/2023 244 (H)     Norhydrocodone 10/05/2023 <25     Noroxycodone 10/05/2023 <25     Oxycodone 10/05/2023 <25     Oxymorphone 10/05/2023 <25           Time Spent:    Prep time: 1 min.  Direct patient time: 30 min.  Documentation time: 8 min.  Total time: 39 min.    Next Appointment:  Follow up in about 8 weeks (around 3/29/2024).

## 2024-02-03 PROBLEM — F33.2 SEVERE EPISODE OF RECURRENT MAJOR DEPRESSIVE DISORDER, WITHOUT PSYCHOTIC FEATURES (MULTI): Status: ACTIVE | Noted: 2024-02-03

## 2024-02-03 PROBLEM — F33.42 RECURRENT MAJOR DEPRESSIVE DISORDER, IN FULL REMISSION (CMS-HCC): Status: RESOLVED | Noted: 2023-09-04 | Resolved: 2024-02-03

## 2024-02-03 ASSESSMENT — ENCOUNTER SYMPTOMS
GASTROINTESTINAL NEGATIVE: 1
ARTHRALGIAS: 1
BACK PAIN: 1
RESPIRATORY NEGATIVE: 1

## 2024-02-09 DIAGNOSIS — Z01.818 PREOPERATIVE CLEARANCE: ICD-10-CM

## 2024-02-09 DIAGNOSIS — Z98.84 BARIATRIC SURGERY STATUS: Primary | ICD-10-CM

## 2024-02-09 DIAGNOSIS — K81.9 CHOLECYSTITIS: ICD-10-CM

## 2024-03-25 PROBLEM — K44.9 HIATAL HERNIA: Status: ACTIVE | Noted: 2024-03-25

## 2024-03-25 RX ORDER — OXYCODONE HCL 5 MG/5 ML
5 SOLUTION, ORAL ORAL EVERY 6 HOURS PRN
Qty: 140 ML | Refills: 0 | Status: SHIPPED | OUTPATIENT
Start: 2024-03-25 | End: 2024-04-01

## 2024-03-25 RX ORDER — OMEPRAZOLE 40 MG/1
40 CAPSULE, DELAYED RELEASE ORAL
Qty: 90 CAPSULE | Refills: 1 | Status: SHIPPED | OUTPATIENT
Start: 2024-03-25 | End: 2024-09-21

## 2024-03-25 RX ORDER — ONDANSETRON 4 MG/1
4 TABLET, ORALLY DISINTEGRATING ORAL EVERY 6 HOURS PRN
Qty: 60 TABLET | Refills: 1 | Status: SHIPPED | OUTPATIENT
Start: 2024-03-25

## 2024-03-25 NOTE — PATIENT INSTRUCTIONS
You are scheduled for Cholecystectomy and Hiatal Hernia Repair with Dr. Bedoya on 4/17/24.      You will receive a phone call the day prior to surgery with your OR arrival time.    Prescriptions for Omeprazole (antacid), Oxycodone (pain),  and Ondansetron (anti-Nausea) have been sent to your retail pharmacy.   Pick these up if you have not yet done so - these are for after surgery.    Be sure to take the omeprazole every day  starting when you get home from the hospital. Open the capsule and sprinkle over SF applesauce, pudding or yogurt. DO NOT MISS A DOSE.     Call with any questions! [phone] for Janette.

## 2024-03-25 NOTE — H&P (VIEW-ONLY)
GENERAL SURGERY CLINIC  FOLLOW UP NOTE      Name: Mandy Ramirez  MRN: [id number]      Index Surgery  Date of Surgery: 2016   Surgeon: Outside Facility    Surgical Procedure: Laparoscopic thelma en y gastric bypass 01764    Date of Surgery: 4/17/24  Surgeon: Dr. Denise Bedoya  Surgical Procedure: Laparoscopic cholecystectomy 37037 and laparoscopic hiatal hernia repair 43290    HPI: 63-year-old patient presenting for a final pre operative visit for a planned cholecystectomy and hiatal hernia repair scheduled 4/17/24.  Patient obtained cardiology clearance for this procedure on 1/26/24.     UGI:  01/10/24  FINDINGS:  Initial  image demonstrates  no major pathology. Multiple  surgical clips in the upper abdomen related to prior Thelma-en-Y  reconstruction.      A normal swallowing mechanism  without nasopharyngeal reflux or  aspiration is observed.      The esophagus was well visualized and normal without intrinsic or  extrinsic compression or obstruction. Peristalsis was  normal.      Small hiatal hernia noted.      The contrast passed freely through the gastrojejunal anastomosis  distally with no major obstruction or holdup of contrast. Dilated  gastric pouch and gastrojejunostomy.      IMPRESSION:  1. Unremarkable esophagram.  2. Status post Thelma-en-Y reconstruction slightly dilated gastric  pouch and gastrojejunostomy but no major obstruction.  3. Small hiatal hernia noted.            Current Outpatient Medications   Medication Sig Dispense Refill    buPROPion XL (Wellbutrin XL) 150 mg 24 hr tablet TAKE 1 TABLET BY MOUTH EVERY DAY 90 tablet 3    buPROPion XL (Wellbutrin XL) 300 mg 24 hr tablet TAKE 1 TABLET BY MOUTH EVERY DAY 90 tablet 3    cholecalciferol (Vitamin D-3) 25 MCG (1000 UT) capsule Take 1 capsule (25 mcg) by mouth once daily.      cloNIDine (Catapres) 0.2 mg tablet TAKE 1 TABLET BY MOUTH EVERYDAY AT BEDTIME 90 tablet 0    cyanocobalamin (Vitamin B-12) 100 mcg tablet Take 1 tablet (100 mcg) by mouth  once daily.      ferrous fumarate-vitamin C (Tamar-Sequeles 65-25) Take 1 tablet by mouth 3 times a day with meals. Do not crush, chew, or split.      moisturizing mouth (Biotene Dry Mouth Oral Rinse) solution Take by mouth.      omega 3-dha-epa-fish oil (Fish OiL) 1,000 mg (120 mg-180 mg) capsule Take by mouth.      omeprazole (PriLOSEC) 40 mg DR capsule Take 1 capsule (40 mg) by mouth once daily in the morning. Take before meals. Do not crush or chew. 90 capsule 3    pyridoxine (Vitamin B-6) 25 mg tablet Take 1 tablet (25 mg) by mouth once daily.      traZODone (Desyrel) 100 mg tablet TAKE 1 TABLET BEDTIME AS DIRECTED (MAY TAKE UP TO 2 TABS AT BEDTIME FOR SLEEP) 180 tablet 0    aluminum hydrox-magnesium carb (Gaviscon Extra Strength) 160-105 mg tablet,chewable Chew.      amphetamine-dextroamphetamine (Adderall) 10 mg tablet Take 1 tablet (10 mg) by mouth 2 times a day. 60 tablet 0    fluoride, sodium, 1.1 % gel Apply to teeth.      levothyroxine (Synthroid, Levoxyl) 50 mcg tablet Take 1 tablet (50 mcg) by mouth once daily.      omeprazole (PriLOSEC) 40 mg DR capsule Take 1 capsule (40 mg) by mouth once daily in the morning. Take before meals. Do not crush or chew. Open capsule, sprinkle beads on SF jello, pudding or applesauce. 90 capsule 1    ondansetron ODT (Zofran-ODT) 4 mg disintegrating tablet Take 1 tablet (4 mg) by mouth every 6 hours if needed for nausea or vomiting. 60 tablet 1    oxyCODONE (Roxicodone) 5 mg/5 mL solution Take 5 mL (5 mg) by mouth every 6 hours if needed for severe pain (7 - 10) or moderate pain (4 - 6) for up to 7 days. 140 mL 0    sertraline (Zoloft) 100 mg tablet Take 1 tablet (100 mg) by mouth once daily. 30 tablet 1     No current facility-administered medications for this visit.       Comorbidities:  Patient Active Problem List   Diagnosis    Acquired hypothyroidism    ADHD (attention deficit hyperactivity disorder)    Depression, recurrent (CMS/HCC)    Generalized anxiety disorder  "   GERD (gastroesophageal reflux disease)    Hyperlipidemia    Impaired fasting glucose    Insomnia    Memory deficits    Osteoarthritis of left patellofemoral joint    Pain in joint of right shoulder    Right-sided low back pain with sciatica    S/P gastric bypass    Vitamin D deficiency    Cholelithiasis    Chronic cough    Beltran's esophagus without dysplasia    Hemangioma of skin and subcutaneous tissue    Dilated cbd, acquired    Marijuana smoker, episodic    Melanocytic nevi of trunk    Melanocytic nevi of other parts of face    Obsessive thinking    Other melanin hyperpigmentation    Other seborrheic keratosis    Poikiloderma of [place]    Melanocytic nevi of left upper limb, including shoulder    Melanocytic nevi of lower limb, including hip    History of dry mouth    Gastric intestinal metaplasia without dysplasia, unspecified site    Hepatitis    Ischial bursitis    Pre-operative clearance    Severe episode of recurrent major depressive disorder, without psychotic features (CMS/HCC)    Cholecystitis    Hiatal hernia         REVIEW OF SYSTEMS:  CONSTITUTIONAL: Patient denies fevers, chills,   CARDIOVASCULAR: Patient denies chest pains, palpitations, orthopnea and paroxysmal nocturnal dyspnea.  RESPIRATORY: No dyspnea on exertion, no wheezing or cough.  GI: No nausea, vomiting, diarrhea, constipation, hematochezia or melena.  MUSCULOSKELETAL: No myalgias or arthralgias.  NEUROLOGIC: No chronic headaches, no seizures. Patient denies numbness, tingling or weakness.  PSYCHIATRIC: Patient denies problems with mood disturbance. No problems with anxiety.  ENDOCRINE: No excessive urination or excessive thirst.  DERMATOLOGIC: Patient denies any rashes or skin changes.    PHYSICAL EXAM:  /82 (BP Location: Left arm, Patient Position: Sitting, BP Cuff Size: Large adult)   Pulse 85   Ht 1.727 m (5' 8\")   Wt 96.2 kg (212 lb)   SpO2 96%   BMI 32.23 kg/m²   Patient is awake and alert  No respiratory " distress  Abdomen is soft, nondistended, nontender, extremities are warm, no edema noted    ASSESSMENT & PLAN:  63-year-old patient presents for a final pre op visit for a planned cholecystectomy and hiatal hernia repair. Cardiology clearance obtained.    Assessment/plan:    We had a long discussion again with the patient today.    She is unclear about having the hiatal hernia repair done anymore.  She was under the impression that this was a mandatory recommendation from her gastroenterologist to get this hiatal hernia repair done regarding her Beltran's esophagus issue.  She also wanted me to narrow down the gastric pouch and the gastric outlet however she is not interested in revising the pouch or the gastric outlet.    I discussed these issues at length with her.    I explained to her that gastric bypass itself is the protection against Beltran's esophagus getting worse repairing the hiatal hernia may not necessarily affect progression of the Beltran's esophagus and because her symptoms are minimal the risk of complications from the surgery itself including bleeding, recurrent hiatal hernia, continued symptoms, dysphagia, injuries to the stomach and esophagus etc. are there.    Discussed the risks of gallbladder surgery including and not limited to bleeding and bile leak, diarrhea etc.    After discussing all these above issues she is unclear if she wants to have a hiatal hernia repair done anymore however she wants to discuss it with her gastroenterologist and wants to keep it on the schedule for now.  If she decides to proceed with cholecystectomy alone then we will do that on the schedule day and if she decides to proceed with hiatal hernia repair as well then we will do that however we are not doing any surgical revision of her gastric bypass.    Scripts were sent to her pharmacy.  Consent was obtained.  Will proceed as mentioned above.

## 2024-03-25 NOTE — PROGRESS NOTES
GENERAL SURGERY CLINIC  FOLLOW UP NOTE      Name: Mandy Ramirez  MRN: [id number]      Index Surgery  Date of Surgery: 2016   Surgeon: Outside Facility    Surgical Procedure: Laparoscopic thelma en y gastric bypass 02054    Date of Surgery: 4/17/24  Surgeon: Dr. Denise Bedoya  Surgical Procedure: Laparoscopic cholecystectomy 05669 and laparoscopic hiatal hernia repair 64330    HPI: 63-year-old patient presenting for a final pre operative visit for a planned cholecystectomy and hiatal hernia repair scheduled 4/17/24.  Patient obtained cardiology clearance for this procedure on 1/26/24.     UGI:  01/10/24  FINDINGS:  Initial  image demonstrates  no major pathology. Multiple  surgical clips in the upper abdomen related to prior Thelma-en-Y  reconstruction.      A normal swallowing mechanism  without nasopharyngeal reflux or  aspiration is observed.      The esophagus was well visualized and normal without intrinsic or  extrinsic compression or obstruction. Peristalsis was  normal.      Small hiatal hernia noted.      The contrast passed freely through the gastrojejunal anastomosis  distally with no major obstruction or holdup of contrast. Dilated  gastric pouch and gastrojejunostomy.      IMPRESSION:  1. Unremarkable esophagram.  2. Status post Thelma-en-Y reconstruction slightly dilated gastric  pouch and gastrojejunostomy but no major obstruction.  3. Small hiatal hernia noted.            Current Outpatient Medications   Medication Sig Dispense Refill    buPROPion XL (Wellbutrin XL) 150 mg 24 hr tablet TAKE 1 TABLET BY MOUTH EVERY DAY 90 tablet 3    buPROPion XL (Wellbutrin XL) 300 mg 24 hr tablet TAKE 1 TABLET BY MOUTH EVERY DAY 90 tablet 3    cholecalciferol (Vitamin D-3) 25 MCG (1000 UT) capsule Take 1 capsule (25 mcg) by mouth once daily.      cloNIDine (Catapres) 0.2 mg tablet TAKE 1 TABLET BY MOUTH EVERYDAY AT BEDTIME 90 tablet 0    cyanocobalamin (Vitamin B-12) 100 mcg tablet Take 1 tablet (100 mcg) by mouth  once daily.      ferrous fumarate-vitamin C (Tamar-Sequeles 65-25) Take 1 tablet by mouth 3 times a day with meals. Do not crush, chew, or split.      moisturizing mouth (Biotene Dry Mouth Oral Rinse) solution Take by mouth.      omega 3-dha-epa-fish oil (Fish OiL) 1,000 mg (120 mg-180 mg) capsule Take by mouth.      omeprazole (PriLOSEC) 40 mg DR capsule Take 1 capsule (40 mg) by mouth once daily in the morning. Take before meals. Do not crush or chew. 90 capsule 3    pyridoxine (Vitamin B-6) 25 mg tablet Take 1 tablet (25 mg) by mouth once daily.      traZODone (Desyrel) 100 mg tablet TAKE 1 TABLET BEDTIME AS DIRECTED (MAY TAKE UP TO 2 TABS AT BEDTIME FOR SLEEP) 180 tablet 0    aluminum hydrox-magnesium carb (Gaviscon Extra Strength) 160-105 mg tablet,chewable Chew.      amphetamine-dextroamphetamine (Adderall) 10 mg tablet Take 1 tablet (10 mg) by mouth 2 times a day. 60 tablet 0    fluoride, sodium, 1.1 % gel Apply to teeth.      levothyroxine (Synthroid, Levoxyl) 50 mcg tablet Take 1 tablet (50 mcg) by mouth once daily.      omeprazole (PriLOSEC) 40 mg DR capsule Take 1 capsule (40 mg) by mouth once daily in the morning. Take before meals. Do not crush or chew. Open capsule, sprinkle beads on SF jello, pudding or applesauce. 90 capsule 1    ondansetron ODT (Zofran-ODT) 4 mg disintegrating tablet Take 1 tablet (4 mg) by mouth every 6 hours if needed for nausea or vomiting. 60 tablet 1    oxyCODONE (Roxicodone) 5 mg/5 mL solution Take 5 mL (5 mg) by mouth every 6 hours if needed for severe pain (7 - 10) or moderate pain (4 - 6) for up to 7 days. 140 mL 0    sertraline (Zoloft) 100 mg tablet Take 1 tablet (100 mg) by mouth once daily. 30 tablet 1     No current facility-administered medications for this visit.       Comorbidities:  Patient Active Problem List   Diagnosis    Acquired hypothyroidism    ADHD (attention deficit hyperactivity disorder)    Depression, recurrent (CMS/HCC)    Generalized anxiety disorder  "   GERD (gastroesophageal reflux disease)    Hyperlipidemia    Impaired fasting glucose    Insomnia    Memory deficits    Osteoarthritis of left patellofemoral joint    Pain in joint of right shoulder    Right-sided low back pain with sciatica    S/P gastric bypass    Vitamin D deficiency    Cholelithiasis    Chronic cough    Beltran's esophagus without dysplasia    Hemangioma of skin and subcutaneous tissue    Dilated cbd, acquired    Marijuana smoker, episodic    Melanocytic nevi of trunk    Melanocytic nevi of other parts of face    Obsessive thinking    Other melanin hyperpigmentation    Other seborrheic keratosis    Poikiloderma of [place]    Melanocytic nevi of left upper limb, including shoulder    Melanocytic nevi of lower limb, including hip    History of dry mouth    Gastric intestinal metaplasia without dysplasia, unspecified site    Hepatitis    Ischial bursitis    Pre-operative clearance    Severe episode of recurrent major depressive disorder, without psychotic features (CMS/HCC)    Cholecystitis    Hiatal hernia         REVIEW OF SYSTEMS:  CONSTITUTIONAL: Patient denies fevers, chills,   CARDIOVASCULAR: Patient denies chest pains, palpitations, orthopnea and paroxysmal nocturnal dyspnea.  RESPIRATORY: No dyspnea on exertion, no wheezing or cough.  GI: No nausea, vomiting, diarrhea, constipation, hematochezia or melena.  MUSCULOSKELETAL: No myalgias or arthralgias.  NEUROLOGIC: No chronic headaches, no seizures. Patient denies numbness, tingling or weakness.  PSYCHIATRIC: Patient denies problems with mood disturbance. No problems with anxiety.  ENDOCRINE: No excessive urination or excessive thirst.  DERMATOLOGIC: Patient denies any rashes or skin changes.    PHYSICAL EXAM:  /82 (BP Location: Left arm, Patient Position: Sitting, BP Cuff Size: Large adult)   Pulse 85   Ht 1.727 m (5' 8\")   Wt 96.2 kg (212 lb)   SpO2 96%   BMI 32.23 kg/m²   Patient is awake and alert  No respiratory " distress  Abdomen is soft, nondistended, nontender, extremities are warm, no edema noted    ASSESSMENT & PLAN:  63-year-old patient presents for a final pre op visit for a planned cholecystectomy and hiatal hernia repair. Cardiology clearance obtained.    Assessment/plan:    We had a long discussion again with the patient today.    She is unclear about having the hiatal hernia repair done anymore.  She was under the impression that this was a mandatory recommendation from her gastroenterologist to get this hiatal hernia repair done regarding her Beltran's esophagus issue.  She also wanted me to narrow down the gastric pouch and the gastric outlet however she is not interested in revising the pouch or the gastric outlet.    I discussed these issues at length with her.    I explained to her that gastric bypass itself is the protection against Beltran's esophagus getting worse repairing the hiatal hernia may not necessarily affect progression of the Beltran's esophagus and because her symptoms are minimal the risk of complications from the surgery itself including bleeding, recurrent hiatal hernia, continued symptoms, dysphagia, injuries to the stomach and esophagus etc. are there.    Discussed the risks of gallbladder surgery including and not limited to bleeding and bile leak, diarrhea etc.    After discussing all these above issues she is unclear if she wants to have a hiatal hernia repair done anymore however she wants to discuss it with her gastroenterologist and wants to keep it on the schedule for now.  If she decides to proceed with cholecystectomy alone then we will do that on the schedule day and if she decides to proceed with hiatal hernia repair as well then we will do that however we are not doing any surgical revision of her gastric bypass.    Scripts were sent to her pharmacy.  Consent was obtained.  Will proceed as mentioned above.

## 2024-03-28 ENCOUNTER — OFFICE VISIT (OUTPATIENT)
Dept: SURGERY | Facility: CLINIC | Age: 64
End: 2024-03-28
Payer: COMMERCIAL

## 2024-03-28 VITALS
OXYGEN SATURATION: 96 % | BODY MASS INDEX: 32.13 KG/M2 | SYSTOLIC BLOOD PRESSURE: 117 MMHG | HEART RATE: 85 BPM | HEIGHT: 68 IN | WEIGHT: 212 LBS | DIASTOLIC BLOOD PRESSURE: 82 MMHG

## 2024-03-28 DIAGNOSIS — Z98.84 S/P GASTRIC BYPASS: ICD-10-CM

## 2024-03-28 DIAGNOSIS — K31.A19 GASTRIC INTESTINAL METAPLASIA WITHOUT DYSPLASIA, UNSPECIFIED SITE: ICD-10-CM

## 2024-03-28 DIAGNOSIS — G89.18 POST-OP PAIN: ICD-10-CM

## 2024-03-28 DIAGNOSIS — Z98.890 POST-OPERATIVE NAUSEA AND VOMITING: ICD-10-CM

## 2024-03-28 DIAGNOSIS — K80.10 CALCULUS OF GALLBLADDER WITH CHOLECYSTITIS WITHOUT BILIARY OBSTRUCTION, UNSPECIFIED CHOLECYSTITIS ACUITY: ICD-10-CM

## 2024-03-28 DIAGNOSIS — K22.70 BARRETT'S ESOPHAGUS WITHOUT DYSPLASIA: ICD-10-CM

## 2024-03-28 DIAGNOSIS — K44.9 HIATAL HERNIA: ICD-10-CM

## 2024-03-28 DIAGNOSIS — R11.2 POST-OPERATIVE NAUSEA AND VOMITING: ICD-10-CM

## 2024-03-28 DIAGNOSIS — K81.9 CHOLECYSTITIS: Primary | ICD-10-CM

## 2024-03-28 PROCEDURE — 99214 OFFICE O/P EST MOD 30 MIN: CPT | Performed by: SURGERY

## 2024-03-28 PROCEDURE — 1036F TOBACCO NON-USER: CPT | Performed by: SURGERY

## 2024-03-29 ENCOUNTER — TELEMEDICINE (OUTPATIENT)
Dept: BEHAVIORAL HEALTH | Facility: CLINIC | Age: 64
End: 2024-03-29
Payer: COMMERCIAL

## 2024-03-29 DIAGNOSIS — F42.8 OBSESSIVE THINKING: ICD-10-CM

## 2024-03-29 DIAGNOSIS — F41.1 GENERALIZED ANXIETY DISORDER: ICD-10-CM

## 2024-03-29 DIAGNOSIS — F33.2 SEVERE EPISODE OF RECURRENT MAJOR DEPRESSIVE DISORDER, WITHOUT PSYCHOTIC FEATURES (MULTI): ICD-10-CM

## 2024-03-29 DIAGNOSIS — F90.0 ATTENTION DEFICIT HYPERACTIVITY DISORDER (ADHD), PREDOMINANTLY INATTENTIVE TYPE: Primary | ICD-10-CM

## 2024-03-29 DIAGNOSIS — G47.00 INSOMNIA, UNSPECIFIED: ICD-10-CM

## 2024-03-29 PROCEDURE — 99214 OFFICE O/P EST MOD 30 MIN: CPT | Performed by: NURSE PRACTITIONER

## 2024-03-29 PROCEDURE — 1036F TOBACCO NON-USER: CPT | Performed by: NURSE PRACTITIONER

## 2024-03-29 RX ORDER — DEXTROAMPHETAMINE SACCHARATE, AMPHETAMINE ASPARTATE, DEXTROAMPHETAMINE SULFATE AND AMPHETAMINE SULFATE 2.5; 2.5; 2.5; 2.5 MG/1; MG/1; MG/1; MG/1
10 TABLET ORAL 2 TIMES DAILY
Qty: 60 TABLET | Refills: 0 | Status: SHIPPED | OUTPATIENT
Start: 2024-05-12 | End: 2024-06-11

## 2024-03-29 RX ORDER — CLONIDINE HYDROCHLORIDE 0.2 MG/1
0.2 TABLET ORAL NIGHTLY
Qty: 90 TABLET | Refills: 3 | Status: SHIPPED | OUTPATIENT
Start: 2024-03-29 | End: 2025-03-29

## 2024-03-29 RX ORDER — SERTRALINE HYDROCHLORIDE 100 MG/1
100 TABLET, FILM COATED ORAL DAILY
Qty: 90 TABLET | Refills: 3 | Status: SHIPPED | OUTPATIENT
Start: 2024-03-29 | End: 2025-03-29

## 2024-03-29 RX ORDER — DEXTROAMPHETAMINE SACCHARATE, AMPHETAMINE ASPARTATE, DEXTROAMPHETAMINE SULFATE AND AMPHETAMINE SULFATE 2.5; 2.5; 2.5; 2.5 MG/1; MG/1; MG/1; MG/1
10 TABLET ORAL 2 TIMES DAILY
Qty: 60 TABLET | Refills: 0 | Status: SHIPPED | OUTPATIENT
Start: 2024-06-11 | End: 2024-04-04 | Stop reason: WASHOUT

## 2024-03-29 RX ORDER — DEXTROAMPHETAMINE SACCHARATE, AMPHETAMINE ASPARTATE, DEXTROAMPHETAMINE SULFATE AND AMPHETAMINE SULFATE 2.5; 2.5; 2.5; 2.5 MG/1; MG/1; MG/1; MG/1
10 TABLET ORAL 2 TIMES DAILY
Qty: 60 TABLET | Refills: 0 | Status: SHIPPED | OUTPATIENT
Start: 2024-04-12 | End: 2024-04-04 | Stop reason: WASHOUT

## 2024-03-29 ASSESSMENT — ENCOUNTER SYMPTOMS
BACK PAIN: 1
CARDIOVASCULAR NEGATIVE: 1
RESPIRATORY NEGATIVE: 1
GASTROINTESTINAL NEGATIVE: 1

## 2024-03-29 ASSESSMENT — ANXIETY QUESTIONNAIRES
5. BEING SO RESTLESS THAT IT IS HARD TO SIT STILL: SEVERAL DAYS
6. BECOMING EASILY ANNOYED OR IRRITABLE: SEVERAL DAYS
4. TROUBLE RELAXING: SEVERAL DAYS
7. FEELING AFRAID AS IF SOMETHING AWFUL MIGHT HAPPEN: NOT AT ALL
GAD7 TOTAL SCORE: 9
2. NOT BEING ABLE TO STOP OR CONTROL WORRYING: MORE THAN HALF THE DAYS
1. FEELING NERVOUS, ANXIOUS, OR ON EDGE: MORE THAN HALF THE DAYS
3. WORRYING TOO MUCH ABOUT DIFFERENT THINGS: MORE THAN HALF THE DAYS
IF YOU CHECKED OFF ANY PROBLEMS ON THIS QUESTIONNAIRE, HOW DIFFICULT HAVE THESE PROBLEMS MADE IT FOR YOU TO DO YOUR WORK, TAKE CARE OF THINGS AT HOME, OR GET ALONG WITH OTHER PEOPLE: SOMEWHAT DIFFICULT

## 2024-03-29 ASSESSMENT — PATIENT HEALTH QUESTIONNAIRE - PHQ9
9. THOUGHTS THAT YOU WOULD BE BETTER OFF DEAD, OR OF HURTING YOURSELF: NOT AT ALL
10. IF YOU CHECKED OFF ANY PROBLEMS, HOW DIFFICULT HAVE THESE PROBLEMS MADE IT FOR YOU TO DO YOUR WORK, TAKE CARE OF THINGS AT HOME, OR GET ALONG WITH OTHER PEOPLE: VERY DIFFICULT
8. MOVING OR SPEAKING SO SLOWLY THAT OTHER PEOPLE COULD HAVE NOTICED. OR THE OPPOSITE, BEING SO FIGETY OR RESTLESS THAT YOU HAVE BEEN MOVING AROUND A LOT MORE THAN USUAL: NOT AT ALL
6. FEELING BAD ABOUT YOURSELF - OR THAT YOU ARE A FAILURE OR HAVE LET YOURSELF OR YOUR FAMILY DOWN: MORE THAN HALF THE DAYS
2. FEELING DOWN, DEPRESSED OR HOPELESS: SEVERAL DAYS
3. TROUBLE FALLING OR STAYING ASLEEP OR SLEEPING TOO MUCH: NOT AT ALL
5. POOR APPETITE OR OVEREATING: MORE THAN HALF THE DAYS
1. LITTLE INTEREST OR PLEASURE IN DOING THINGS: MORE THAN HALF THE DAYS
4. FEELING TIRED OR HAVING LITTLE ENERGY: MORE THAN HALF THE DAYS
7. TROUBLE CONCENTRATING ON THINGS, SUCH AS READING THE NEWSPAPER OR WATCHING TELEVISION: MORE THAN HALF THE DAYS

## 2024-03-29 NOTE — PROGRESS NOTES
"Adult Ambulatory Psychiatry Progress Note      Assessment/Plan     Impression:  Mandy Ramirez is a 63 y.o. 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 I am work with so many things to do. I am still my own worst problem. I can't get my life in order with procrasinating. I still don't notice a difference with the Zoloft but I still take it. I am getting my gall bladder out and my hiatal hernia fixed at the same time, and start on Ozempic. I did have gastric bypass 17 years ago... as I have gained weight.\"    Plan: Reviewed and agreed that as patient reports and presents herself to be improving, including her memory issues and concentration, that no changes to medications or treatment plan are needed at this time. Increased dose of Zoloft did have a positive impact on her mood. Did discuss a research study that indicated the use of stimulants in older adults and its long term impact on dementia - Adults with ADHD are at increased risk for developing dementia.    Medication: Zoloft 100mg every day, Continues Adderall 10mg twice daily, Wellbutrin XL 450mg every day, Clonidine 0.2mg at bedtime, Trazodone 200mg at bedtime.    Reviewed r/b/a, possible side effects of the medication. Client is aware about the benefit outweighs the risk.     Diagnoses and all orders for this visit:  Attention deficit hyperactivity disorder (ADHD), predominantly inattentive type  -     cloNIDine (Catapres) 0.2 mg tablet; Take 1 tablet (0.2 mg) by mouth once daily at bedtime.  -     amphetamine-dextroamphetamine (Adderall) 10 mg tablet; Take 1 tablet (10 mg) by mouth 2 times a day. Do not start before April 12, 2024.  -     amphetamine-dextroamphetamine (Adderall) 10 mg tablet; Take 1 tablet (10 mg) by mouth 2 times a day. Do not start before May 12, 2024.  -     amphetamine-dextroamphetamine (Adderall) 10 mg tablet; Take " "1 tablet (10 mg) by mouth 2 times a day. Do not start before 2024.  Insomnia, unspecified  -     cloNIDine (Catapres) 0.2 mg tablet; Take 1 tablet (0.2 mg) by mouth once daily at bedtime.  Generalized anxiety disorder  -     sertraline (Zoloft) 100 mg tablet; Take 1 tablet (100 mg) by mouth once daily.  Obsessive thinking  -     sertraline (Zoloft) 100 mg tablet; Take 1 tablet (100 mg) by mouth once daily.  Severe episode of recurrent major depressive disorder, without psychotic features (CMS/HCC)  -     sertraline (Zoloft) 100 mg tablet; Take 1 tablet (100 mg) by mouth once daily.        Therapy: n/a    Other: n/a    Patient is reminded that if there is SI to call [phone] and get themselves to the closest ED for evaluation, otherwise contact me for other questions/concerns.     Subjective   HPI:  \"Both the patient, and family and caregivers and guardians as appropriate, were informed of the current need to conduct treatment via telephone. I have confirmed the patient's identity via the following (minimum of three) acceptable identifiers as per  Policy PH-9: S.S., , home address.\"     Present Illness - anxiety, depression     Characteristics/Recent psychiatric symptoms (pertinent positives and negatives) - reports with her ADHD, she keeps forgetting to take her medications and finds it to be not as worrying as before. \"I did better than I did in 2019, but I guess I should do a different test.\" Reports feeling less lost mentally - less scattered and 'sometimes just getting used it (forgetfulness, scatteredness) but also like how my  is admitting to himself with his health and accepting what he is dealing with it. That no longer being pissed or sad about what is happening to him.' Reports her anxiety is 'not bad as things are smoother in my life. I am not as worried about my daughter. My kids as much, as it was a bad last year. My  has the leukemia but it is moving slow. He has to have another " special colonscopy. And I do have some stress that I have so much to do.' Reports feeling some 'normal stress' with having to get her house cleaned/prepared for easter weekend and getting through her doctors' appts. Reports depression has been 'way better than it was before. I can however beat myself up in my head, but not every day.' Reports noticing how 'not upset I have been about things.' Reports memory issues and foggy thinking are improving. Reports sleep is down to to 5 hours 'but that's on me' and still takes both Trazodone and Clonidine together. Reports racing, obsessive, ruminating thoughts had slowed down. Reports appetite 'is always there, with my liver being bad but I feel that has healed up.' Reports energy levels are 'down' and more 'mental and physical fatigue' d/t sciatica pain over recent weeks, but feels she is on the mend and starting to feel better.      Onset/timeframe - 2 months  Type - anxiety, depression  Duration - daily  Aggravating and/or relieving factors/triggers - worrying about her daughter's health, daughter's decisions, 's disability status (her finishing submitting his paperwork), her doing all of the financial/balancing of the books for her work, her own health.  Related symptoms  Treatment and treatment changes (new meds, dosage increases or decreases, med compliance, therapy frequency, etc.) (Past and Recent) - Zoloft 50mg QD, Wellbtutrin XL 450mg QD, Trazodone 200mg @[facility]. Adderall IR 10mg BID, Clonidine 0.2mg @[facility]. Seeing Mo Townsend for therapy.     Issues: Denies SI/HI/AVH, currently.     With Neurology testing (including brain scan) there is noticeable atrophy (normal) of her brain.         Review of Systems   Respiratory: Negative.     Cardiovascular: Negative.    Gastrointestinal: Negative.    Endocrine:        Thyroid issues, hair thinning   Genitourinary: Negative.    Musculoskeletal:  Positive for back pain.       OARRS:  Olivier Stokes, APRN-CNP on 3/29/2024   6:04 PM  I have personally reviewed the OARRS report for Mandy Ramirez. I have considered the risks of abuse, dependence, addiction and diversion    Is the patient prescribed a combination of a benzodiazepine and opioid?  No    Last Urine Drug Screen / ordered today: No  Recent Results (from the past 8760 hour(s))   Opiate Confirmation, Urine    Collection Time: 10/05/23  1:16 PM   Result Value Ref Range    6-Acetylmorphine <25 <25 ng/mL    Codeine <50 <50 ng/mL    Hydrocodone <25 <25 ng/mL    Hydromorphone <25 <25 ng/mL    Morphine  244 (H) <50 ng/mL    Norhydrocodone <25 <25 ng/mL    Noroxycodone <25 <25 ng/mL    Oxycodone <25 <25 ng/mL    Oxymorphone <25 <25 ng/mL   Amphetamine Confirm, Urine    Collection Time: 10/05/23  1:16 PM   Result Value Ref Range    Methamphetamine Quant, Ur <200 ng/mL    MDA, Urine <200 ng/mL    MDEA, Urine <200 ng/mL    Phentermine,Urine <200 ng/mL    Amphetamines,Urine 2425 ng/mL    MDMA, Urine <200 ng/mL   Drug Screen, Urine With Reflex to Confirmation    Collection Time: 10/05/23  1:16 PM   Result Value Ref Range    Amphetamine Screen, Urine Presumptive Positive (A) Presumptive Negative    Barbiturate Screen, Urine Presumptive Negative Presumptive Negative    Benzodiazepines Screen, Urine Presumptive Negative Presumptive Negative    Cannabinoid Screen, Urine Presumptive Negative Presumptive Negative    Cocaine Metabolite Screen, Urine Presumptive Negative Presumptive Negative    Fentanyl Screen, Urine Presumptive Negative Presumptive Negative    Opiate Screen, Urine Presumptive Positive (A) Presumptive Negative    Oxycodone Screen, Urine Presumptive Negative Presumptive Negative    PCP Screen, Urine Presumptive Negative Presumptive Negative     Results are as expected.         Controlled Substance Agreement:  Date of the Last Agreement: 02/03/2024  Reviewed Controlled Substance Agreement including but not limited to the benefits, risks, and alternatives to treatment with a  Controlled Substance medication(s).    Stimulants:   What is the patient's goal of therapy? Stable attention  Is this being achieved with current treatment? Not all the time, but this is due to issues with depression    Activities of Daily Living:   Is your overall impression that this patient is benefiting (symptom reduction outweighs side effects) from stimulant therapy? No     1. Physical Functioning: Better  2. Family Relationship: Better  3. Social Relationship: Better  4. Mood: Better  5. Sleep Patterns: Better  6. Overall Function: Better      Objective   Mental Status Exam:  General Appearance: Well groomed, appropriate eye contact  Attitude/Behavior: Distracted, Cooperative  Motor: Fidgeting  Speech: Normal rate, volume, prosody  Gait/Station: Other:(comment) (sitting at home office desk, over virtual connection)  Mood: 'better'  Affect: Euthymic, full-range, Anxious, Congruent with mood and topic of conversation  Thought Process: Linear, goal directed  Thought Associations: No loosening of associations  Thought Content: Normal, Other: (comment) (feeling less problematic with memory or attention issues, and both anxiety and depression is improving.)  Perception: No perceptual abnormalities noted  Sensorium: Alert and oriented to person, place, time and situation  Insight: Fair  Judgement: Intact  Cognition: Cognitively intact to conversational testing with respect to attention, orientation, fund of knowledge, recent and remote memory, and language  Testing: N/A    SATISH-7/PHQ-9 scores reviewed: 9, 11 compared to 16, 21 reflecting significant decreases in anxiety and depression.     Current Medications:  Current Outpatient Medications on File Prior to Visit   Medication Sig Dispense Refill    amphetamine-dextroamphetamine (Adderall) 10 mg tablet Take 1 tablet (10 mg) by mouth 2 times a day. 60 tablet 0    buPROPion XL (Wellbutrin XL) 150 mg 24 hr tablet TAKE 1 TABLET BY MOUTH EVERY DAY 90 tablet 3    buPROPion  XL (Wellbutrin XL) 300 mg 24 hr tablet TAKE 1 TABLET BY MOUTH EVERY DAY 90 tablet 3    traZODone (Desyrel) 100 mg tablet TAKE 1 TABLET BEDTIME AS DIRECTED (MAY TAKE UP TO 2 TABS AT BEDTIME FOR SLEEP) 180 tablet 0    [DISCONTINUED] cloNIDine (Catapres) 0.2 mg tablet TAKE 1 TABLET BY MOUTH EVERYDAY AT BEDTIME 90 tablet 0    [DISCONTINUED] sertraline (Zoloft) 100 mg tablet Take 1 tablet (100 mg) by mouth once daily. 30 tablet 1    aluminum hydrox-magnesium carb (Gaviscon Extra Strength) 160-105 mg tablet,chewable Chew.      cholecalciferol (Vitamin D-3) 25 MCG (1000 UT) capsule Take 1 capsule (25 mcg) by mouth once daily.      cyanocobalamin (Vitamin B-12) 100 mcg tablet Take 1 tablet (100 mcg) by mouth once daily.      ferrous fumarate-vitamin C (Tamar-Sequeles 65-25) Take 1 tablet by mouth 3 times a day with meals. Do not crush, chew, or split.      fluoride, sodium, 1.1 % gel Apply to teeth.      levothyroxine (Synthroid, Levoxyl) 50 mcg tablet Take 1 tablet (50 mcg) by mouth once daily.      moisturizing mouth (Biotene Dry Mouth Oral Rinse) solution Take by mouth.      omega 3-dha-epa-fish oil (Fish OiL) 1,000 mg (120 mg-180 mg) capsule Take by mouth.      omeprazole (PriLOSEC) 40 mg DR capsule Take 1 capsule (40 mg) by mouth once daily in the morning. Take before meals. Do not crush or chew. 90 capsule 3    omeprazole (PriLOSEC) 40 mg DR capsule Take 1 capsule (40 mg) by mouth once daily in the morning. Take before meals. Do not crush or chew. Open capsule, sprinkle beads on SF jello, pudding or applesauce. 90 capsule 1    ondansetron ODT (Zofran-ODT) 4 mg disintegrating tablet Take 1 tablet (4 mg) by mouth every 6 hours if needed for nausea or vomiting. 60 tablet 1    oxyCODONE (Roxicodone) 5 mg/5 mL solution Take 5 mL (5 mg) by mouth every 6 hours if needed for severe pain (7 - 10) or moderate pain (4 - 6) for up to 7 days. 140 mL 0    pyridoxine (Vitamin B-6) 25 mg tablet Take 1 tablet (25 mg) by mouth once  daily.       No current facility-administered medications on file prior to visit.       Lab Review:   Lab on 10/05/2023   Component Date Value    Amphetamine Screen, Urine 10/05/2023 Presumptive Positive (A)     Barbiturate Screen, Urine 10/05/2023 Presumptive Negative     Benzodiazepines Screen, * 10/05/2023 Presumptive Negative     Cannabinoid Screen, Urine 10/05/2023 Presumptive Negative     Cocaine Metabolite Scree* 10/05/2023 Presumptive Negative     Fentanyl Screen, Urine 10/05/2023 Presumptive Negative     Opiate Screen, Urine 10/05/2023 Presumptive Positive (A)     Oxycodone Screen, Urine 10/05/2023 Presumptive Negative     PCP Screen, Urine 10/05/2023 Presumptive Negative     Methamphetamine Quant, Ur 10/05/2023 <200     MDA, Urine 10/05/2023 <200     MDEA, Urine 10/05/2023 <200     Phentermine,Urine 10/05/2023 <200     Amphetamines,Urine 10/05/2023 2575     MDMA, Urine 10/05/2023 <200     Methamphetamine Quant, Ur 10/05/2023 <200     MDA, Urine 10/05/2023 <200     MDEA, Urine 10/05/2023 <200     Phentermine,Urine 10/05/2023 <200     Amphetamines,Urine 10/05/2023 2425     MDMA, Urine 10/05/2023 <200     6-Acetylmorphine 10/05/2023 <25     Codeine 10/05/2023 <50     Hydrocodone 10/05/2023 <25     Hydromorphone 10/05/2023 <25     Morphine  10/05/2023 244 (H)     Norhydrocodone 10/05/2023 <25     Noroxycodone 10/05/2023 <25     Oxycodone 10/05/2023 <25     Oxymorphone 10/05/2023 <25          Time Spent:    Prep time: 1 min.  Direct patient time: 26 min.  Documentation time: 8 min.  Total time: 35 min.    Next Appointment:  Follow up in about 3 months (around 6/29/2024).

## 2024-04-01 ENCOUNTER — TELEPHONE (OUTPATIENT)
Dept: SURGERY | Facility: CLINIC | Age: 64
End: 2024-04-01
Payer: COMMERCIAL

## 2024-04-01 NOTE — TELEPHONE ENCOUNTER
Left message letting her know that Revised appointment letter sent.  RD virtual visit on 4/5 and 1 week post op visit change to inperson from virtual

## 2024-04-04 ENCOUNTER — PRE-ADMISSION TESTING (OUTPATIENT)
Dept: PREADMISSION TESTING | Facility: HOSPITAL | Age: 64
End: 2024-04-04
Payer: COMMERCIAL

## 2024-04-04 ENCOUNTER — HOSPITAL ENCOUNTER (OUTPATIENT)
Dept: RADIOLOGY | Facility: HOSPITAL | Age: 64
Discharge: HOME | End: 2024-04-04
Payer: COMMERCIAL

## 2024-04-04 VITALS
RESPIRATION RATE: 16 BRPM | HEART RATE: 80 BPM | TEMPERATURE: 97.2 F | OXYGEN SATURATION: 98 % | BODY MASS INDEX: 32.48 KG/M2 | SYSTOLIC BLOOD PRESSURE: 131 MMHG | DIASTOLIC BLOOD PRESSURE: 81 MMHG | WEIGHT: 214.29 LBS | HEIGHT: 68 IN

## 2024-04-04 DIAGNOSIS — Z01.818 PREOP TESTING: Primary | ICD-10-CM

## 2024-04-04 DIAGNOSIS — Z98.84 BARIATRIC SURGERY STATUS: ICD-10-CM

## 2024-04-04 DIAGNOSIS — Z01.818 PREOPERATIVE CLEARANCE: ICD-10-CM

## 2024-04-04 LAB
ABO GROUP (TYPE) IN BLOOD: NORMAL
ALBUMIN SERPL BCP-MCNC: 4.2 G/DL (ref 3.4–5)
ALP SERPL-CCNC: 81 U/L (ref 33–136)
ALT SERPL W P-5'-P-CCNC: 14 U/L (ref 7–45)
ANION GAP SERPL CALC-SCNC: 11 MMOL/L (ref 10–20)
ANTIBODY SCREEN: NORMAL
APPEARANCE UR: CLEAR
AST SERPL W P-5'-P-CCNC: 18 U/L (ref 9–39)
BASOPHILS # BLD AUTO: 0.06 X10*3/UL (ref 0–0.1)
BASOPHILS NFR BLD AUTO: 0.7 %
BILIRUB SERPL-MCNC: 0.5 MG/DL (ref 0–1.2)
BILIRUB UR STRIP.AUTO-MCNC: NEGATIVE MG/DL
BUN SERPL-MCNC: 9 MG/DL (ref 6–23)
CALCIUM SERPL-MCNC: 9.4 MG/DL (ref 8.6–10.3)
CHLORIDE SERPL-SCNC: 103 MMOL/L (ref 98–107)
CO2 SERPL-SCNC: 28 MMOL/L (ref 21–32)
COLOR UR: YELLOW
CREAT SERPL-MCNC: 0.94 MG/DL (ref 0.5–1.05)
EGFRCR SERPLBLD CKD-EPI 2021: 68 ML/MIN/1.73M*2
EOSINOPHIL # BLD AUTO: 1.12 X10*3/UL (ref 0–0.7)
EOSINOPHIL NFR BLD AUTO: 13.1 %
ERYTHROCYTE [DISTWIDTH] IN BLOOD BY AUTOMATED COUNT: 13.6 % (ref 11.5–14.5)
GLUCOSE SERPL-MCNC: 95 MG/DL (ref 74–99)
GLUCOSE UR STRIP.AUTO-MCNC: NEGATIVE MG/DL
HCT VFR BLD AUTO: 47.4 % (ref 36–46)
HGB BLD-MCNC: 15.8 G/DL (ref 12–16)
HOLD SPECIMEN: NORMAL
IMM GRANULOCYTES # BLD AUTO: 0.03 X10*3/UL (ref 0–0.7)
IMM GRANULOCYTES NFR BLD AUTO: 0.4 % (ref 0–0.9)
INR PPP: 1 (ref 0.9–1.1)
KETONES UR STRIP.AUTO-MCNC: NEGATIVE MG/DL
LEUKOCYTE ESTERASE UR QL STRIP.AUTO: ABNORMAL
LYMPHOCYTES # BLD AUTO: 2.86 X10*3/UL (ref 1.2–4.8)
LYMPHOCYTES NFR BLD AUTO: 33.5 %
MCH RBC QN AUTO: 31.2 PG (ref 26–34)
MCHC RBC AUTO-ENTMCNC: 33.3 G/DL (ref 32–36)
MCV RBC AUTO: 94 FL (ref 80–100)
MONOCYTES # BLD AUTO: 0.61 X10*3/UL (ref 0.1–1)
MONOCYTES NFR BLD AUTO: 7.2 %
MUCOUS THREADS #/AREA URNS AUTO: NORMAL /LPF
NEUTROPHILS # BLD AUTO: 3.85 X10*3/UL (ref 1.2–7.7)
NEUTROPHILS NFR BLD AUTO: 45.1 %
NITRITE UR QL STRIP.AUTO: NEGATIVE
NRBC BLD-RTO: 0 /100 WBCS (ref 0–0)
PH UR STRIP.AUTO: 6 [PH]
PLATELET # BLD AUTO: 269 X10*3/UL (ref 150–450)
POTASSIUM SERPL-SCNC: 3.9 MMOL/L (ref 3.5–5.3)
PROT SERPL-MCNC: 6.4 G/DL (ref 6.4–8.2)
PROT UR STRIP.AUTO-MCNC: NEGATIVE MG/DL
PROTHROMBIN TIME: 11.1 SECONDS (ref 9.8–12.8)
RBC # BLD AUTO: 5.07 X10*6/UL (ref 4–5.2)
RBC # UR STRIP.AUTO: NEGATIVE /UL
RBC #/AREA URNS AUTO: NORMAL /HPF
RH FACTOR (ANTIGEN D): NORMAL
SODIUM SERPL-SCNC: 138 MMOL/L (ref 136–145)
SP GR UR STRIP.AUTO: 1.01
T4 FREE SERPL-MCNC: 0.81 NG/DL (ref 0.61–1.12)
TSH SERPL-ACNC: 4.15 MIU/L (ref 0.44–3.98)
UROBILINOGEN UR STRIP.AUTO-MCNC: 2 MG/DL
WBC # BLD AUTO: 8.5 X10*3/UL (ref 4.4–11.3)
WBC #/AREA URNS AUTO: NORMAL /HPF

## 2024-04-04 PROCEDURE — 87086 URINE CULTURE/COLONY COUNT: CPT | Mod: PARLAB

## 2024-04-04 PROCEDURE — 80053 COMPREHEN METABOLIC PANEL: CPT

## 2024-04-04 PROCEDURE — 86901 BLOOD TYPING SEROLOGIC RH(D): CPT

## 2024-04-04 PROCEDURE — 85025 COMPLETE CBC W/AUTO DIFF WBC: CPT

## 2024-04-04 PROCEDURE — 80323 ALKALOIDS NOS: CPT

## 2024-04-04 PROCEDURE — 85610 PROTHROMBIN TIME: CPT

## 2024-04-04 PROCEDURE — 71045 X-RAY EXAM CHEST 1 VIEW: CPT

## 2024-04-04 PROCEDURE — 36415 COLL VENOUS BLD VENIPUNCTURE: CPT

## 2024-04-04 PROCEDURE — 81001 URINALYSIS AUTO W/SCOPE: CPT

## 2024-04-04 PROCEDURE — 99203 OFFICE O/P NEW LOW 30 MIN: CPT | Performed by: NURSE PRACTITIONER

## 2024-04-04 PROCEDURE — 84439 ASSAY OF FREE THYROXINE: CPT

## 2024-04-04 PROCEDURE — 84443 ASSAY THYROID STIM HORMONE: CPT

## 2024-04-04 ASSESSMENT — DUKE ACTIVITY SCORE INDEX (DASI)
DASI METS SCORE: 9
CAN YOU DO HEAVY WORK AROUND THE HOUSE LIKE SCRUBBING FLOORS OR LIFTING AND MOVING HEAVY FURNITURE: YES
CAN YOU TAKE CARE OF YOURSELF (EAT, DRESS, BATHE, OR USE TOILET): YES
CAN YOU CLIMB A FLIGHT OF STAIRS OR WALK UP A HILL: YES
CAN YOU DO LIGHT WORK AROUND THE HOUSE LIKE DUSTING OR WASHING DISHES: YES
CAN YOU DO YARD WORK LIKE RAKING LEAVES, WEEDING OR PUSHING A MOWER: YES
CAN YOU PARTICIPATE IN STRENOUS SPORTS LIKE SWIMMING, SINGLES TENNIS, FOOTBALL, BASKETBALL, OR SKIING: NO
CAN YOU WALK A BLOCK OR TWO ON LEVEL GROUND: YES
CAN YOU DO MODERATE WORK AROUND THE HOUSE LIKE VACUUMING, SWEEPING FLOORS OR CARRYING GROCERIES: YES
CAN YOU HAVE SEXUAL RELATIONS: YES
TOTAL_SCORE: 50.7
CAN YOU WALK INDOORS, SUCH AS AROUND YOUR HOUSE: YES
CAN YOU RUN A SHORT DISTANCE: YES
CAN YOU PARTICIPATE IN MODERATE RECREATIONAL ACTIVITIES LIKE GOLF, BOWLING, DANCING, DOUBLES TENNIS OR THROWING A BASEBALL OR FOOTBALL: YES

## 2024-04-04 ASSESSMENT — PAIN - FUNCTIONAL ASSESSMENT: PAIN_FUNCTIONAL_ASSESSMENT: 0-10

## 2024-04-04 ASSESSMENT — PAIN SCALES - GENERAL: PAINLEVEL_OUTOF10: 0 - NO PAIN

## 2024-04-04 NOTE — CPM/PAT H&P
"CPM/PAT Evaluation       Name: Mandy Ramirez (Mandy Ramirez)  /Age: 1960/63 y.o.     In-Person       Chief Complaint: Cholecystitis    63 yr old female with c/o cholecystitis.  Reports year long hx of various GI symptoms including intermittent nausea, abdominal pain, unable to consume food as symptoms worsened; originally thought was medication related and followed up with medical treatment and testing.  States was found to have gall stones \"tons of little ones\" and a fatty liver.  Additionally reports hx of heart burn, acid reflux and gaining some relief with medication (omeprazole).  Denies any recent weight loss, infection, blood in stool or changes in bowel habits.  Patient states uncertainty regarding whether or not surgery will include a hernia repair, adding as plans to discuss further with GI ([facility]).  Patient reports remaining otherwise physically active, denies past issues with anesthesia.        Followed by PCP (Matteo) with last visit on 2023        Past Medical History:   Diagnosis Date    Anxiety     Cataract     Change in voice 2024    Cholelithiasis     Chronic cough     Depression     Essential hypertension, benign 2006    Fatty liver     Hypothyroidism     Joint pain     Neoplasm of uncertain behavior of skin 2017    Personal history of other diseases of the musculoskeletal system and connective tissue     History of arthritis    Personal history of other endocrine, nutritional and metabolic disease 2016    History of diabetes mellitus    Personal history of other endocrine, nutritional and metabolic disease     History of hyperlipidemia    Personal history of other infectious and parasitic diseases     History of varicella    Personal history of other infectious and parasitic diseases     History of measles    Personal history of other infectious and parasitic diseases     History of mumps    Personal history of pneumonia (recurrent)     History of " pneumonia    Postnasal drip 2024    Sciatica     Trigger finger, acquired 2023    Unspecified visual loss     Vision loss    Vision loss     Visual impairment 2024       Past Surgical History:   Procedure Laterality Date    ADENOIDECTOMY      BREAST BIOPSY       SECTION, CLASSIC  2016     Section    COLONOSCOPY      GASTRIC BYPASS  2016    Gastric Surgery For Morbid Obesity Gastric Bypass    HAND SURGERY      RIGHT HAND TENDON    NO PAST SURGERIES      OTHER SURGICAL HISTORY  2020    [place] tooth extraction    TONSILLECTOMY  2016    Tonsillectomy    UPPER GASTROINTESTINAL ENDOSCOPY      US GUIDED NEEDLE LIVER BIOPSY  2023    US GUIDED NEEDLE LIVER BIOPSY 2023 PAR US       Patient Sexual activity questions deferred to the physician.    Family History   Problem Relation Name Age of Onset    Multiple sclerosis Mother      Other (ALCOHOL DEPENDENCE) Father      Heart failure Father      Diabetes Father      Other (MENTAL DEVELOPMENT) Sister      Other (MARIJUANA DEPENDENCE) Brother         Allergies   Allergen Reactions    Egg Derived GI Upset and Nausea/vomiting     PATIENT STATES HIGH SENSITIVITY TO ALL EGG PRODUCTS AND ITEMS THAT INCLUDE INGREDIENT EGG    Egg GI Upset, Nausea/vomiting, GI intolerance and Unknown     Gastris pain       Prior to Admission medications    Medication Sig Start Date End Date Taking? Authorizing Provider   aluminum hydrox-magnesium carb (Gaviscon Extra Strength) 160-105 mg tablet,chewable Chew. 22   Historical Provider, MD   amphetamine-dextroamphetamine (Adderall) 10 mg tablet Take 1 tablet (10 mg) by mouth 2 times a day. 2/2/24 3/29/24  WILFRED Khan   amphetamine-dextroamphetamine (Adderall) 10 mg tablet Take 1 tablet (10 mg) by mouth 2 times a day. Do not start before 2024. 24  WILFRED Khan   amphetamine-dextroamphetamine (Adderall) 10 mg tablet Take 1 tablet (10 mg)  by mouth 2 times a day. Do not start before May 12, 2024. 5/12/24 6/11/24  WILFRED Khan   amphetamine-dextroamphetamine (Adderall) 10 mg tablet Take 1 tablet (10 mg) by mouth 2 times a day. Do not start before June 11, 2024. 6/11/24 7/11/24  WILFRED Khan   buPROPion XL (Wellbutrin XL) 150 mg 24 hr tablet TAKE 1 TABLET BY MOUTH EVERY DAY 10/9/23   WILFRED Khan   buPROPion XL (Wellbutrin XL) 300 mg 24 hr tablet TAKE 1 TABLET BY MOUTH EVERY DAY 10/9/23   WILFRED Khan   cholecalciferol (Vitamin D-3) 25 MCG (1000 UT) capsule Take 1 capsule (25 mcg) by mouth once daily.    Historical Provider, MD   cloNIDine (Catapres) 0.2 mg tablet Take 1 tablet (0.2 mg) by mouth once daily at bedtime. 3/29/24 3/29/25  WILFRED Khan   cyanocobalamin (Vitamin B-12) 100 mcg tablet Take 1 tablet (100 mcg) by mouth once daily.    Historical Provider, MD   ferrous fumarate-vitamin C (Tamar-Sequeles 65-25) Take 1 tablet by mouth 3 times a day with meals. Do not crush, chew, or split.    Historical Provider, MD   fluoride, sodium, 1.1 % gel Apply to teeth. 10/4/21   Historical Provider, MD   levothyroxine (Synthroid, Levoxyl) 50 mcg tablet Take 1 tablet (50 mcg) by mouth once daily. 5/26/22   Historical Provider, MD   moisturizing mouth (Biotene Dry Mouth Oral Rinse) solution Take by mouth. 7/13/23   Historical Provider, MD   omega 3-dha-epa-fish oil (Fish OiL) 1,000 mg (120 mg-180 mg) capsule Take by mouth.    Historical Provider, MD   omeprazole (PriLOSEC) 40 mg DR capsule Take 1 capsule (40 mg) by mouth once daily in the morning. Take before meals. Do not crush or chew. 12/21/23 12/20/24  Denise Bedoya MD   omeprazole (PriLOSEC) 40 mg DR capsule Take 1 capsule (40 mg) by mouth once daily in the morning. Take before meals. Do not crush or chew. Open capsule, sprinkle beads on SF jello, pudding or applesauce. 3/25/24 9/21/24  Jenny Junior MD   ondansetron ODT (Zofran-ODT) 4 mg  disintegrating tablet Take 1 tablet (4 mg) by mouth every 6 hours if needed for nausea or vomiting. 3/25/24   Jenny Junior MD   oxyCODONE (Roxicodone) 5 mg/5 mL solution Take 5 mL (5 mg) by mouth every 6 hours if needed for severe pain (7 - 10) or moderate pain (4 - 6) for up to 7 days. 3/25/24 4/1/24  Jenny Junior MD   pyridoxine (Vitamin B-6) 25 mg tablet Take 1 tablet (25 mg) by mouth once daily.    Historical Provider, MD   sertraline (Zoloft) 100 mg tablet Take 1 tablet (100 mg) by mouth once daily. 3/29/24 3/29/25  WILFRED Khan   traZODone (Desyrel) 100 mg tablet TAKE 1 TABLET BEDTIME AS DIRECTED (MAY TAKE UP TO 2 TABS AT BEDTIME FOR SLEEP) 2/19/24   WILFRED Khan   cloNIDine (Catapres) 0.2 mg tablet TAKE 1 TABLET BY MOUTH EVERYDAY AT BEDTIME 12/18/23 3/29/24  WILFRED Khan   sertraline (Zoloft) 100 mg tablet Take 1 tablet (100 mg) by mouth once daily. 2/2/24 3/29/24  WILFRED Khan        Review of Systems    Constitutional: no fever, no chills and not feeling poorly.   Eyes: no eyesight problems.   ENT: no hearing loss, no nosebleeds and no sore throat.   Cardiovascular: no chest pain, no palpitations and no extremity edema.   Respiratory: no shortness of breath, no wheezing, no cough and no sob with exertion.   Gastrointestinal: see HPI   Genitourinary: negative for dysuria, incontinence or changes in urinary habits   Musculoskeletal: no arthralgias and no gait abnormality.   Integumentary: negative for lesions, rash or itching.   Neurological: negative for confusion, dizziness, fainting or difficulty walking.   Psychiatric: not suicidal, no anxiety and no depression.   All other systems have been reviewed and are negative for complaint.     Physical Exam  Constitutional:       General: No acute distress.     Aox3, pleasant and cooperative, appropriate mood and eye contact   HENT:      Head: Normocephalic.      Mouth/Throat: Mucous membranes moist &  pink  Eyes:      Vision grossly intact, PERRLA, corrective lenses in use  Neck:      No carotid bruit, no JVD  Cardiovascular:      RRR, S1S2, no murmurs, rubs or gallops  Pulmonary:      Symmetric chest expansion, CTA, Room Air  Abdominal:      Soft non-tender, BSx4   Skin:     Warm, dry & intact   Extremities:      No gross deformities; normal gait   Neurological:      No focal deficit, Aox3, MILLS x4  Psychiatric:      Pleasant & cooperative, appropriate affect    PAT AIRWAY:   Airway:     Mallampati::  I    TM distance::  >3 FB    Neck ROM::  Full   implants      Visit Vitals  /81   Pulse 80   Temp 36.2 °C (97.2 °F) (Temporal)   Resp 16       DASI Risk Score      Flowsheet Row Most Recent Value   DASI SCORE 50.7   METS Score (Will be calculated only when all the questions are answered) 9          Caprini DVT Assessment    No data to display       Modified Frailty Index    No data to display       CHADS2 Stroke Risk  Current as of 4 minutes ago        N/A 3 - 100%: High Risk   2 - 3%: Medium Risk   0 - 2%: Low Risk     Last Change: N/A          This score determines the patient's risk of having a stroke if the patient has atrial fibrillation.        This score is not applicable to this patient. Components are not calculated.          Revised Cardiac Risk Index    No data to display       Apfel Simplified Score    No data to display       Risk Analysis Index Results This Encounter    No data found in the last 1 encounters.       Stop Bang Score      Flowsheet Row Most Recent Value   Do you snore loudly? 0   Do you often feel tired or fatigued after your sleep? 0   Has anyone ever observed you stop breathing in your sleep? 0   Do you have or are you being treated for high blood pressure? 0   Recent BMI (Calculated) 32.2   Is BMI greater than 35 kg/m2? 0=No   Age older than 50 years old? 1=Yes   Is your neck circumference greater than 17 inches (Male) or 16 inches (Female)? 0   Gender - Male 0=No   STOP-BANG  Total Score 1            Assessment and Plan:     Followed by general surgery, Cholecystitis    Laparoscopic cholecystectomy w/operative cholangiograms & c-arm/possible open;  w/Dr Bedoya on 4/17/2024       Cardiac clearance provided (Christopher, 1/26/2024)  Reviewed ecg dated 1/26/2024 - NSR (70 bpm)

## 2024-04-04 NOTE — PREPROCEDURE INSTRUCTIONS
Medication List            Accurate as of April 4, 2024 10:10 AM. Always use your most recent med list.                * amphetamine-dextroamphetamine 10 mg tablet  Commonly known as: Adderall  Take 1 tablet (10 mg) by mouth 2 times a day.  Medication Adjustments for Surgery: Continue until night before surgery     * amphetamine-dextroamphetamine 10 mg tablet  Commonly known as: Adderall  Take 1 tablet (10 mg) by mouth 2 times a day. Do not start before May 12, 2024.  Start taking on: May 12, 2024  Medication Adjustments for Surgery: Continue until night before surgery     Biotene Dry Mouth Oral Rinse solution  Generic drug: moisturizing mouth  Medication Adjustments for Surgery: Take morning of surgery with sip of water, no other fluids     * buPROPion  mg 24 hr tablet  Commonly known as: Wellbutrin XL  TAKE 1 TABLET BY MOUTH EVERY DAY  Medication Adjustments for Surgery: Take morning of surgery with sip of water, no other fluids     * buPROPion  mg 24 hr tablet  Commonly known as: Wellbutrin XL  TAKE 1 TABLET BY MOUTH EVERY DAY  Medication Adjustments for Surgery: Take morning of surgery with sip of water, no other fluids     cholecalciferol 25 MCG (1000 UT) capsule  Commonly known as: Vitamin D-3  Medication Adjustments for Surgery: Stop 7 days before surgery     cloNIDine 0.2 mg tablet  Commonly known as: Catapres  Take 1 tablet (0.2 mg) by mouth once daily at bedtime.  Medication Adjustments for Surgery: Continue until night before surgery     cyanocobalamin 100 mcg tablet  Commonly known as: Vitamin B-12  Medication Adjustments for Surgery: Stop 7 days before surgery     ferrous fumarate-vitamin C  Commonly known as: Tamar-Sequeles 65-25  Medication Adjustments for Surgery: Stop 7 days before surgery     Fish OiL 1,000 mg (120 mg-180 mg) capsule  Generic drug: omega 3-dha-epa-fish oil  Medication Adjustments for Surgery: Stop 7 days before surgery     fluoride (sodium) 1.1 % gel  Medication  Adjustments for Surgery: Continue until night before surgery     Gaviscon Extra Strength 160-105 mg tablet,chewable  Generic drug: aluminum hydrox-magnesium carb  Medication Adjustments for Surgery: Continue until night before surgery     levothyroxine 50 mcg tablet  Commonly known as: Synthroid, Levoxyl  Medication Adjustments for Surgery: Take morning of surgery with sip of water, no other fluids     omeprazole 40 mg DR capsule  Commonly known as: PriLOSEC  Take 1 capsule (40 mg) by mouth once daily in the morning. Take before meals. Do not crush or chew. Open capsule, sprinkle beads on SF jello, pudding or applesauce.     ondansetron ODT 4 mg disintegrating tablet  Commonly known as: Zofran-ODT  Take 1 tablet (4 mg) by mouth every 6 hours if needed for nausea or vomiting.     pyridoxine 25 mg tablet  Commonly known as: Vitamin B-6  Medication Adjustments for Surgery: Stop 7 days before surgery     sertraline 100 mg tablet  Commonly known as: Zoloft  Take 1 tablet (100 mg) by mouth once daily.  Medication Adjustments for Surgery: Take morning of surgery with sip of water, no other fluids     traZODone 100 mg tablet  Commonly known as: Desyrel  TAKE 1 TABLET BEDTIME AS DIRECTED (MAY TAKE UP TO 2 TABS AT BEDTIME FOR SLEEP)  Medication Adjustments for Surgery: Continue until night before surgery           * This list has 4 medication(s) that are the same as other medications prescribed for you. Read the directions carefully, and ask your doctor or other care provider to review them with you.              .  PRE-OPERATIVE INSTRUCTIONS FOR SURGERY    *Do not eat anything after midnight the night of surgery.  This includes food of any kind (including hard candy, cough drops, mints).   You may have up to TEN ounces of clear liquid  until TWO hours prior to your arrival time to the hospital.  This includes water, black tea/coffee, (no milk or cream) apple juice and electrolyte drinks (GATORADE).  You may chew gum until  TWO hours prior you your surgery/procedure.     PLEASE TAKE THE FOLLOWING MEDICATION WITH SIPS CLEAR LIQUID AM OF SURGERY:    ZOLOFT  BUPROPRION  THYROID MEDICATION    STOP  OVER THE COUNTER VITAMINS AND SUPPLEMENTS 7 DAYS PRIOR TO SURGERY    *One of our staff members will call you ONE business day before your surgery, between 11am-2 pm to let you know the time to arrive.    If you have no received a call by 2 pm, call [phone]    *When you arrive at the hospital-->GO TO Registration on the ground floor    *Stop smoking 24 hours prior to surgery.  No Marijuana, CBD Oil or Vaping for 48 hours    *No alcohol 24 hours prior to surgery    *You will need a responsible adult to drive you home    -No acrylic nails or nail polish on at least one fingernail, NO polish on toes for foot surgery    -You may be asked to remove your dentures, partial plate, eyeglasses or contact lenses before going to surgery.  Please bring a case for these items.    -Body piercings need to be removed.  Jewelry and valuables should be left at home.    -Put on loose,  comfortable, clean clothing, that will accommodate bandages    PLEASE SHOWER AM OF SURGERY TO REMOVE BODY RESIDUE, DO NOT REAPPLY ANY LOTIONS, POWDERS, MEDICATED BALMS, DEODORANT, ETC AFTER SHOWER    What you may be asked to bring to surgery:    ___PHOTO ID AND INSURANCE INFORMATION  ___ CELL PHONE, CHORD  ___        NPO Instructions:    Do not eat any food after midnight the night before your surgery/procedure.  You may have clear liquids until TWO hours before surgery/procedure. This includes water, black tea/coffee, (no milk or cream) apple juice and electrolyte drinks (Gatorade).    Additional Instructions:     Day of Surgery:  You may have clear liquids until TWO hours before surgery/procedure.  This includes water, black tea/coffee, (no milk or cream) apple juice and electrolyte drinks (Gatorade)  Wear  comfortable loose fitting clothing  Do not use moisturizers, creams,  lotions or perfume  All jewelry and valuables should be left at home

## 2024-04-05 ENCOUNTER — NUTRITION (OUTPATIENT)
Dept: SURGERY | Facility: CLINIC | Age: 64
End: 2024-04-05
Payer: COMMERCIAL

## 2024-04-05 VITALS — WEIGHT: 212 LBS | BODY MASS INDEX: 32.13 KG/M2 | HEIGHT: 68 IN

## 2024-04-05 LAB — BACTERIA UR CULT: NO GROWTH

## 2024-04-05 NOTE — PROGRESS NOTES
Surgery Date:  2007  Surgeon:    Hermes Patino    Procedure:  gastric bypass    ASSESSMENT:  Current weight pounds:    212.0               Ht:    68.0 in.   BMI:  32.23  Initial start weight pounds:   212.0  EBW pounds:    48.0    24 hr recall:   Breakfast:   Marina Boelus  cookies  Snack:   none  Lunch:    salad w/ham and chicken  Snack:    Doritos  Dinner:    couldn't remember  Snack:     Beverages:   1 c coffee/day  water, diet Pepsi, Crystal light, carbonated sugar free beverages  Alcohol:   wine 3-4x/month       Vitamins:    B6, B12, D3, iron, fish oil, Mg, Turmeric, Vit c gummies occasionally,   2 calcium carbonate gummies, Women's MVI  Physical Activity: no planned exercise, uses standing desk, play with grandchildren.    READINESS TO LEARN:  Motivation to learn:           Interested      Understanding of instruction: Good       Anticipated Compliance: Good      Family Support: Unable to assess-family not present     Patient presents with post-op weight loss surgery gastric bypass. Pt had surgery in 2007 at [facility] with Dr. Hermes Patino. She was lost to follow up.  She is not meeting protein needs.  She is not following correct postop supplementation regimen.  She is drinking carbonated beverages also.    Reviewed correct postop supplementation.  Discussed increasing protein intake and ways to increase it to meet her goal.  Advised to dc carbonated beverages.   Pt will prioritize protein and following the 30-30-30 rule.  Pt agreed to try recommendations.       Malnutrition Screening  Significant unintentional weight loss? n/a  Eating less than 75% of usual intake for more than 2 weeks? n/a     Nutrition Diagnosis:   1. Increased protein and nutrition needs related to altered GI function as evidenced by pt. s/p gastric bypass.  2. Food- and nutrition-related knowledge deficit related to lack of prior exposure to surgical weight loss information as evidenced by diet recall.     Nutrition Interventions:   1. Modify type  and amount of food and nutrients within meals and snacks.  2. Comprehensive Nutrition Education  -Nutrition education materials: SG schedule        Recommendations:    1. Eat at least 80 g protein daily. Aim for 3oz or 20 g protein per meal and have 2 high protein snacks that are  10-20 g protein each.  You can try a tuna or chicken packet, Greek yogurt, 2 string cheeses, Protein bars like Quest, Pure Protein, Premier, or Built Bars. you can also try protein chips form Quest or Atkins.    2. Drink 64 oz. of zero calorie beverages per day. Eliminate carbonated beverages.  3. Start following no drinking 30 min before, during the meal and for 30 minutes after the meal  4. Increase intensity and duration of exercise  5. Take 1 multivitamin 2x/day with food. Take 0185-7306 mg of calcium citrate daily.  Take in in divided doses of no more than 500-600 mg at a time 2 hours apart from each other and from your multivitamin.  You can try Citrical or generic calcium citrate if you like.   Continue to take the other supplements.    6.             Attend monthly support groups    Nutrition Monitoring and Evaluation:   1-2 pounds weight loss per week  Criteria: weight check, food recall  Need for Follow-up:   1 month       Jennifer RED, [facility]  Bariatric Surgery Dietitian  Phone: [phone]  Fax: [phone]

## 2024-04-07 LAB
ANABASINE UR-MCNC: <5 NG/ML
COTININE UR-MCNC: <15 NG/ML
NICOTINE UR-MCNC: <15 NG/ML
TRANS-3-OH-COTININE UR-MCNC: <50 NG/ML

## 2024-04-08 NOTE — PROGRESS NOTES
GENERAL SURGERY CLINIC  FOLLOW UP NOTE      Name: Mandy Ramirez  MRN: [id number]      Index Surgery  Date of Surgery: 2016   Surgeon: Outside Facility    Surgical Procedure: Laparoscopic thelma en y gastric bypass 89529    Date of Surgery: 4/17/24  Surgeon: Dr. Denise Bedoya  Surgical Procedure: Laparoscopic cholecystectomy 89474     HPI: 63-year-old patient presenting for further discussion of surgical plan on 4/17/24. Patient is scheduled for a cholecystectomy and hiatal hernia repair.  At her final pre op visit, it was discussed at length the protective nature of the thelma-en-y gastric bypass procedure which patinet has already had, leaving the repair of a small sliding hiatal hernia not necessary.  Patient has lingering questions about this.  Patient obtained cardiology clearance for this procedure on 1/26/24.     UGI:  01/10/24  FINDINGS:  Initial  image demonstrates  no major pathology. Multiple  surgical clips in the upper abdomen related to prior Thelma-en-Y  reconstruction.      A normal swallowing mechanism  without nasopharyngeal reflux or  aspiration is observed.      The esophagus was well visualized and normal without intrinsic or  extrinsic compression or obstruction. Peristalsis was  normal.      Small hiatal hernia noted.      The contrast passed freely through the gastrojejunal anastomosis  distally with no major obstruction or holdup of contrast. Dilated  gastric pouch and gastrojejunostomy.      IMPRESSION:  1. Unremarkable esophagram.  2. Status post Thelma-en-Y reconstruction slightly dilated gastric  pouch and gastrojejunostomy but no major obstruction.  3. Small hiatal hernia noted.            Current Outpatient Medications   Medication Sig Dispense Refill    aluminum hydrox-magnesium carb (Gaviscon Extra Strength) 160-105 mg tablet,chewable Chew.      amphetamine-dextroamphetamine (Adderall) 10 mg tablet Take 1 tablet (10 mg) by mouth 2 times a day. 60 tablet 0    [START ON 5/12/2024]  amphetamine-dextroamphetamine (Adderall) 10 mg tablet Take 1 tablet (10 mg) by mouth 2 times a day. Do not start before May 12, 2024. 60 tablet 0    buPROPion XL (Wellbutrin XL) 150 mg 24 hr tablet TAKE 1 TABLET BY MOUTH EVERY DAY 90 tablet 3    buPROPion XL (Wellbutrin XL) 300 mg 24 hr tablet TAKE 1 TABLET BY MOUTH EVERY DAY 90 tablet 3    cholecalciferol (Vitamin D-3) 25 MCG (1000 UT) capsule Take 1 capsule (25 mcg) by mouth once daily.      cloNIDine (Catapres) 0.2 mg tablet Take 1 tablet (0.2 mg) by mouth once daily at bedtime. 90 tablet 3    cyanocobalamin (Vitamin B-12) 100 mcg tablet Take 1 tablet (100 mcg) by mouth once daily.      ferrous fumarate-vitamin C (Tamar-[place] 65-25) Take 1 tablet by mouth 3 times a day with meals. Do not crush, chew, or split.      fluoride, sodium, 1.1 % gel Apply to teeth.      levothyroxine (Synthroid, Levoxyl) 50 mcg tablet Take 1 tablet (50 mcg) by mouth once daily.      moisturizing mouth (Biotene Dry Mouth Oral Rinse) solution Take by mouth.      omega 3-dha-epa-fish oil (Fish OiL) 1,000 mg (120 mg-180 mg) capsule Take by mouth.      omeprazole (PriLOSEC) 40 mg DR capsule Take 1 capsule (40 mg) by mouth once daily in the morning. Take before meals. Do not crush or chew. Open capsule, sprinkle beads on SF jello, pudding or applesauce. 90 capsule 1    ondansetron ODT (Zofran-ODT) 4 mg disintegrating tablet Take 1 tablet (4 mg) by mouth every 6 hours if needed for nausea or vomiting. 60 tablet 1    pyridoxine (Vitamin B-6) 25 mg tablet Take 1 tablet (25 mg) by mouth once daily.      sertraline (Zoloft) 100 mg tablet Take 1 tablet (100 mg) by mouth once daily. 90 tablet 3    traZODone (Desyrel) 100 mg tablet TAKE 1 TABLET BEDTIME AS DIRECTED (MAY TAKE UP TO 2 TABS AT BEDTIME FOR SLEEP) 180 tablet 0     No current facility-administered medications for this visit.       Comorbidities:  Patient Active Problem List   Diagnosis    Acquired hypothyroidism    ADHD (attention  deficit hyperactivity disorder)    Depression, recurrent (CMS/HCC)    Generalized anxiety disorder    GERD (gastroesophageal reflux disease)    Hyperlipidemia    Impaired fasting glucose    Insomnia    Memory deficits    Osteoarthritis of left patellofemoral joint    Pain in joint of right shoulder    Right-sided low back pain with sciatica    S/P gastric bypass    Vitamin D deficiency    Cholelithiasis    Chronic cough    Beltran's esophagus without dysplasia    Hemangioma of skin and subcutaneous tissue    Dilated cbd, acquired    Marijuana smoker, episodic    Melanocytic nevi of trunk    Melanocytic nevi of other parts of face    Obsessive thinking    Other melanin hyperpigmentation    Other seborrheic keratosis    Poikiloderma of [place]    Melanocytic nevi of left upper limb, including shoulder    Melanocytic nevi of lower limb, including hip    History of dry mouth    Gastric intestinal metaplasia without dysplasia, unspecified site    Hepatitis    Ischial bursitis    Pre-operative clearance    Severe episode of recurrent major depressive disorder, without psychotic features (CMS/HCC)    Cholecystitis    Hiatal hernia         REVIEW OF SYSTEMS:  CONSTITUTIONAL: Patient denies fevers, chills,   CARDIOVASCULAR: Patient denies chest pains, palpitations, orthopnea and paroxysmal nocturnal dyspnea.  RESPIRATORY: No dyspnea on exertion, no wheezing or cough.  GI: No nausea, vomiting, diarrhea, constipation, hematochezia or melena.  MUSCULOSKELETAL: No myalgias or arthralgias.  NEUROLOGIC: No chronic headaches, no seizures. Patient denies numbness, tingling or weakness.  PSYCHIATRIC: Patient denies problems with mood disturbance. No problems with anxiety.  ENDOCRINE: No excessive urination or excessive thirst.  DERMATOLOGIC: Patient denies any rashes or skin changes.    PHYSICAL EXAM:  There were no vitals taken for this visit.  Patient is awake and alert  No respiratory distress  Abdomen is soft, nondistended,  nontender, extremities are warm, no edema noted    ASSESSMENT & PLAN:  63-year-old patient presents for further discussion for a planned cholecystectomy. Cardiology clearance obtained.    Assessment/plan:

## 2024-04-11 ENCOUNTER — APPOINTMENT (OUTPATIENT)
Dept: SURGERY | Facility: CLINIC | Age: 64
End: 2024-04-11
Payer: COMMERCIAL

## 2024-04-11 PROBLEM — K80.20 GALLSTONES: Status: ACTIVE | Noted: 2024-04-11

## 2024-04-11 RX ORDER — OXYCODONE HCL 5 MG/5 ML
10 SOLUTION, ORAL ORAL EVERY 6 HOURS PRN
Status: CANCELLED | OUTPATIENT
Start: 2024-04-11

## 2024-04-11 RX ORDER — OXYCODONE HCL 5 MG/5 ML
5 SOLUTION, ORAL ORAL EVERY 6 HOURS PRN
Status: CANCELLED | OUTPATIENT
Start: 2024-04-11

## 2024-04-11 RX ORDER — ONDANSETRON HYDROCHLORIDE 2 MG/ML
4 INJECTION, SOLUTION INTRAVENOUS EVERY 8 HOURS PRN
Status: CANCELLED | OUTPATIENT
Start: 2024-04-11

## 2024-04-11 RX ORDER — ACETAMINOPHEN 160 MG/5ML
650 SOLUTION ORAL EVERY 4 HOURS PRN
Status: CANCELLED | OUTPATIENT
Start: 2024-04-11

## 2024-04-11 RX ORDER — ONDANSETRON 4 MG/1
4 TABLET, ORALLY DISINTEGRATING ORAL EVERY 8 HOURS PRN
Status: CANCELLED | OUTPATIENT
Start: 2024-04-11

## 2024-04-11 RX ORDER — HEPARIN SODIUM 5000 [USP'U]/ML
5000 INJECTION, SOLUTION INTRAVENOUS; SUBCUTANEOUS EVERY 8 HOURS
Status: CANCELLED | OUTPATIENT
Start: 2024-04-11

## 2024-04-11 RX ORDER — ACETAMINOPHEN 325 MG/1
650 TABLET ORAL EVERY 4 HOURS PRN
Status: CANCELLED | OUTPATIENT
Start: 2024-04-11

## 2024-04-12 ENCOUNTER — TELEMEDICINE (OUTPATIENT)
Dept: SURGERY | Facility: HOSPITAL | Age: 64
End: 2024-04-12
Payer: COMMERCIAL

## 2024-04-12 VITALS — BODY MASS INDEX: 32.43 KG/M2 | WEIGHT: 214 LBS | HEIGHT: 68 IN

## 2024-04-12 DIAGNOSIS — Z98.84 S/P GASTRIC BYPASS: Primary | ICD-10-CM

## 2024-04-12 DIAGNOSIS — E66.09 CLASS 1 OBESITY DUE TO EXCESS CALORIES WITH SERIOUS COMORBIDITY AND BODY MASS INDEX (BMI) OF 30.0 TO 30.9 IN ADULT: ICD-10-CM

## 2024-04-12 DIAGNOSIS — K91.2 INTESTINAL MALABSORPTION FOLLOWING GASTRECTOMY (HHS-HCC): ICD-10-CM

## 2024-04-12 DIAGNOSIS — Z90.3 INTESTINAL MALABSORPTION FOLLOWING GASTRECTOMY (HHS-HCC): ICD-10-CM

## 2024-04-12 DIAGNOSIS — K21.00 GASTROESOPHAGEAL REFLUX DISEASE WITH ESOPHAGITIS WITHOUT HEMORRHAGE: ICD-10-CM

## 2024-04-12 DIAGNOSIS — K21.9 HIATAL HERNIA WITH GERD: ICD-10-CM

## 2024-04-12 DIAGNOSIS — Z98.84 WEIGHT GAIN FOLLOWING GASTRIC BYPASS SURGERY: ICD-10-CM

## 2024-04-12 DIAGNOSIS — K44.9 HIATAL HERNIA WITH GERD: ICD-10-CM

## 2024-04-12 DIAGNOSIS — K80.20 CALCULUS OF GALLBLADDER WITHOUT CHOLECYSTITIS WITHOUT OBSTRUCTION: ICD-10-CM

## 2024-04-12 DIAGNOSIS — R63.5 WEIGHT GAIN FOLLOWING GASTRIC BYPASS SURGERY: ICD-10-CM

## 2024-04-12 PROBLEM — E66.811 CLASS 1 OBESITY DUE TO EXCESS CALORIES WITH SERIOUS COMORBIDITY AND BODY MASS INDEX (BMI) OF 30.0 TO 30.9 IN ADULT: Status: ACTIVE | Noted: 2024-04-12

## 2024-04-12 PROCEDURE — 3008F BODY MASS INDEX DOCD: CPT

## 2024-04-12 PROCEDURE — 99215 OFFICE O/P EST HI 40 MIN: CPT

## 2024-04-12 PROCEDURE — 99215 OFFICE O/P EST HI 40 MIN: CPT | Mod: 95,ZK

## 2024-04-12 NOTE — PROGRESS NOTES
BARIATRIC SURGERY CLINIC  FOLLOW UP NOTE      Name: Mandy Ramirez  MRN: [id number]    Virtual or Telephone Consent    An interactive audio and video telecommunication system which permits real time communications between the patient (at the originating site) and provider (at the distant site) was utilized to provide this telehealth service.   Verbal consent was requested and obtained from Mandy Ramirez on this date, 04/12/24 for a telehealth visit.        Index Surgery  Date of Surgery: 2016   Surgeon: Outside Facility    Surgical Procedure: Laparoscopic marine en y gastric bypass [id number]    Original RYGB 2007 by Dr. Colón  Pre-surgery weight: over 300 lbs  Lowest weight: 190 lbs    HPI:   Presenting for follow up visit.  Mandy states that she did not have her annual follow ups.   Recently she has been dealing with problems associated with exacerbations of chronic liver disease as well as cholelithiasis and cholecystitis.  She was also diagnosed with saeed's esophagitis. On 4/17/24 she will undergo Laparoscopic cholecystectomy and laparoscopic hiatal hernia repair with Dr. Denise Bedoya    Diet Regular. She reports not counting her calories/protein. Reports not maintaining 30-30-30 rule. Reports reaching her daily water consumption goal.  She states that she does not feel any restrictions r/t RYGB, and can eat the full meal. She does feel hunger.      Exercise 3x/week,  - strength training focus.    Concerns related to:  Nausea/Vomiting, Reflux: denies, controlled with Omeprazole  Abdominal Pain: denies  Diarrhea/Constipation: Diarrhea     DAILY SUPPLEMENTS:  Calcium: Calcium Citrate w/ vitamin D (1200 - 1500mg) - denies  Multivitamin & Minerals: 2 per day  Vitamin B12: 100 mcg/day   Vitamin D3: 1000 units  Iron/Other: Iron with vit C  PPI:Omeprazole 40 mg       Current Outpatient Medications   Medication Sig Dispense Refill    aluminum hydrox-magnesium carb (Gaviscon Extra Strength) 160-105  mg tablet,chewable Chew.      amphetamine-dextroamphetamine (Adderall) 10 mg tablet Take 1 tablet (10 mg) by mouth 2 times a day. 60 tablet 0    [START ON 5/12/2024] amphetamine-dextroamphetamine (Adderall) 10 mg tablet Take 1 tablet (10 mg) by mouth 2 times a day. Do not start before May 12, 2024. 60 tablet 0    buPROPion XL (Wellbutrin XL) 150 mg 24 hr tablet TAKE 1 TABLET BY MOUTH EVERY DAY 90 tablet 3    buPROPion XL (Wellbutrin XL) 300 mg 24 hr tablet TAKE 1 TABLET BY MOUTH EVERY DAY 90 tablet 3    cholecalciferol (Vitamin D-3) 25 MCG (1000 UT) capsule Take 1 capsule (25 mcg) by mouth once daily.      cloNIDine (Catapres) 0.2 mg tablet Take 1 tablet (0.2 mg) by mouth once daily at bedtime. 90 tablet 3    cyanocobalamin (Vitamin B-12) 100 mcg tablet Take 1 tablet (100 mcg) by mouth once daily.      ferrous fumarate-vitamin C (Tamar-[place] 65-25) Take 1 tablet by mouth 3 times a day with meals. Do not crush, chew, or split.      fluoride, sodium, 1.1 % gel Apply to teeth.      levothyroxine (Synthroid, Levoxyl) 50 mcg tablet Take 1 tablet (50 mcg) by mouth once daily.      moisturizing mouth (Biotene Dry Mouth Oral Rinse) solution Take by mouth.      omega 3-dha-epa-fish oil (Fish OiL) 1,000 mg (120 mg-180 mg) capsule Take by mouth.      omeprazole (PriLOSEC) 40 mg DR capsule Take 1 capsule (40 mg) by mouth once daily in the morning. Take before meals. Do not crush or chew. Open capsule, sprinkle beads on SF jello, pudding or applesauce. 90 capsule 1    ondansetron ODT (Zofran-ODT) 4 mg disintegrating tablet Take 1 tablet (4 mg) by mouth every 6 hours if needed for nausea or vomiting. 60 tablet 1    pyridoxine (Vitamin B-6) 25 mg tablet Take 1 tablet (25 mg) by mouth once daily.      sertraline (Zoloft) 100 mg tablet Take 1 tablet (100 mg) by mouth once daily. 90 tablet 3    traZODone (Desyrel) 100 mg tablet TAKE 1 TABLET BEDTIME AS DIRECTED (MAY TAKE UP TO 2 TABS AT BEDTIME FOR SLEEP) 180 tablet 0     No  "current facility-administered medications for this visit.       Comorbidities:  Patient Active Problem List   Diagnosis    Acquired hypothyroidism    ADHD (attention deficit hyperactivity disorder)    Depression, recurrent (CMS-HCC)    Generalized anxiety disorder    GERD (gastroesophageal reflux disease)    Hyperlipidemia    Impaired fasting glucose    Insomnia    Memory deficits    Osteoarthritis of left patellofemoral joint    Pain in joint of right shoulder    Right-sided low back pain with sciatica    S/P gastric bypass    Vitamin D deficiency    Cholelithiasis    Chronic cough    Beltran's esophagus without dysplasia    Hemangioma of skin and subcutaneous tissue    Dilated cbd, acquired    Marijuana smoker, episodic    Melanocytic nevi of trunk    Melanocytic nevi of other parts of face    Obsessive thinking    Other melanin hyperpigmentation    Other seborrheic keratosis    Poikiloderma of [place]    Melanocytic nevi of left upper limb, including shoulder    Melanocytic nevi of lower limb, including hip    History of dry mouth    Gastric intestinal metaplasia without dysplasia, unspecified site    Hepatitis    Ischial bursitis    Pre-operative clearance    Severe episode of recurrent major depressive disorder, without psychotic features (Multi)    Cholecystitis    Hiatal hernia    Gallstones    Class 1 obesity due to excess calories with serious comorbidity and body mass index (BMI) of 30.0 to 30.9 in adult    Hiatal hernia with GERD    Weight gain following gastric bypass surgery    Intestinal malabsorption following gastrectomy ([facility])         REVIEW OF SYSTEMS:  + knee pain, controlled with injections  + RUQ periodic pain  + Depression/Anxiety, controlled with meds  All other systems are negative, unless noted on HPI    PHYSICAL EXAM:  Ht 1.727 m (5' 8\")   Wt 97.1 kg (214 lb)   BMI 32.54 kg/m²   Alert, well appearing, no acute distress, nourished, hydrated.  Anicteric sclera, no ptosis  Facial " symmetry  Neck supple  Unlabored respirations.  Easily conversant without increased respiratory effort  Oriented to person, place, time.  Judgement intact.  Appropriate mood, affect.       ASSESSMENT & PLAN:  63 y.o. female presenting for follow up visit s/p bariatric surgery.  She has not followed up with bariatric team ever since her RYGB. She has been taking MVI.   She is dealing now with Liver and gall bladder problems, and is about to undergo Cholecystectomy with Hiatal Hernia repair by Dr. Bedoya.       Current Weight: 214 lbs     Wt Readings from Last 1 Encounters:   04/12/24 97.1 kg (214 lb)       Diagnoses and all orders for this visit:  S/P gastric bypass  -     Zinc, Serum or Plasma; Future  -     Vitamin B12; Future  -     Vitamin A; Future  -     Vitamin K; Future  -     TSH with reflex to Free T4 if abnormal; Future  -     Ferritin; Future  -     Vitamin B6; Future  -     Iron and TIBC; Future  -     Lipid Panel; Future  -     Comprehensive Metabolic Panel; Future  -     Vitamin D 25-Hydroxy,Total (for eval of Vitamin D levels); Future  -     Parathyroid Hormone, Intact; Future  -     Vitamin B1, Whole Blood; Future  -     Folate; Future  -     Copper, Blood; Future  -     CBC; Future  -     Referral to Nutrition Services; Future  Class 1 obesity due to excess calories with serious comorbidity and body mass index (BMI) of 30.0 to 30.9 in adult  -     Referral to Nutrition Services; Future  Hiatal hernia with GERD  Calculus of gallbladder without cholecystitis without obstruction  -     Referral to Bariatric Surgery  Weight gain following gastric bypass surgery  -     Referral to Bariatric Surgery  -     Referral to Nutrition Services; Future  Intestinal malabsorption following gastrectomy ([facility])  -     Zinc, Serum or Plasma; Future  -     Vitamin B12; Future  -     Vitamin A; Future  -     Vitamin K; Future  -     TSH with reflex to Free T4 if abnormal; Future  -     Ferritin; Future  -     Vitamin  B6; Future  -     Iron and TIBC; Future  -     Lipid Panel; Future  -     Comprehensive Metabolic Panel; Future  -     Vitamin D 25-Hydroxy,Total (for eval of Vitamin D levels); Future  -     Parathyroid Hormone, Intact; Future  -     Vitamin B1, Whole Blood; Future  -     Folate; Future  -     Copper, Blood; Future  -     CBC; Future  Gastroesophageal reflux disease with esophagitis without hemorrhage    Maru will have her   Total Weight Loss of: 83 lbs  Current BMI: 32  Patient will need to see RD before our next visit.  Recommend attending support groups  Patients' handouts with Weight Loss Surgery Pouch Rules,  Quick & Easy High Protein Meal Ideas/Snacks, etc are emailed.  Patient will read and process this information while she is recovering after her surgery and implement it once she is advanced to regular diet. In a mean time she will keep track of her PO intake (calories, protein, carbs).    > 50% of time spent counseling on the importance of following recommended dietary modifications including: role of diet, low calorie and carbohydrate restrictions, limiting fast food and avoiding high sugary beverages.   Also discussed, the role of exercise with an ultimate goal of at least 250-300 minutes a week for weight loss and weight maintenance.

## 2024-04-12 NOTE — PATIENT INSTRUCTIONS
"The following are the recommendations we discussed at your appointment today:  1. Nutrition  - Please make sure you are seeing the bariatric dietitian regularly.    Dietitian Information:   Mekhi Mercado  Jennifer: [phone]  [facility] - [place] [phone]      Detailed Diet Recommendations:  Self monitoring through some metric can be a tool to help with weight loss. Measuring portions can be one way to monitor your nutrition intake to support health improvement.   - Some apps you can use are Be my eyes, Lose It, Carb Manager, Cronometer, etc. Self monitoring is an extremely useful tool however please recognize that external tracking devices for activity and calories are not perfect, underestimating of total calorie intake is common, as well as overestimation through activity trackers of total calorie expenditure.     Please limit processed foods in the diet as they are known to contribute to obesity and weight gain. Processed foods include food items like bakery, chips, snack crackers, cereals, cookies, some pasta/breads, etc. These foods will typically come from \"bag, boxes with barcodes\" from the middle aisles of the grocery store - they contain added sugars and refined flours, are low in fiber and may stimulate appetite rather than help manage it effectively.     Increasing your intake of HIGH FIBER carbohydrates sources to increase fullness and appetite regulation with meals and between meals. High fiber foods are vegetables, fruit, beans, lentils, nuts/seeds, etc.      WHAT WILL MY PLATE LOOK LIKE IF I EAT LIKE THIS?.    - Aim for MINIMUM 25-30g of protein at meals: approx 4 oz of high protein food like chicken, fish, turkey, beef, pork, 2-3 eggs, 1/2-3/4 cup cottage cheese or plain greek yogurt. At each meal EAT PROTEIN FIRST! This sets the stage to help better regulate your appetite during and between meals. Think \"Palm of your hand portion of protein\" at each meal.     - The remainder of the plate " "is optimized to increase quality of the diet by including plenty of vegetables, appropriate fruit or starch portions: Aim for \"2 fist sized portions of fruits and vegetables with each meal\". A \"fist sized\" portion is about 1 cup.   - Choose a variety of vegetables, fruits and whole grains - aim to eat a wide variety of colors to improve quality of diet.     Fat included with meals is best in small portions - Fat is calorie dense so it is important to monitor the portion, to avoid excess calories. Keep servings to the followin-2 tbsp olive/avocado/coconut oil (can be used to cook or dress vegetables), 1-2 tbsp unsweetened nut butter (peanut, almond, etc), 5-6 olives, 1/3 avocado, 1/2-1 oz nuts/seeds likes walnuts, almonds, pecans, brazil nuts, etc. This would include if you are cooking your meal with oil/butter/etc.     Please avoid liquid sources of calories as we often do not think about their contribution to our total daily calorie intake nor do they help regulate appetite when working towards weight loss. Hydration is important, aim for at least 64 oz zero calorie beverages daily, ideally water.     Your Protein Goals will gradually increase as you get further out from surgery:  0-3 months - 60 GRAMS PER DAY  3-6 months - 60-75 GRAMS PER DAY  6-12 months - 75-90 GRAMS PER DAY  12+ months - 80-90 GRAMS PER DAY    - Follow Pouch Rules;.   Stop drinking 30 minutes before your meals, Take 30 minutes to eat your meal   - NO DRINKING DURING MEALS, Wait for 30 minutes after your meal to drink  - Eat 5 servings of fruits and veggies daily.  A serving is 1 small (tennis ball size) piece of whole fruit, 1/2 cup fresh fruit, 1 cup non starchy vegetables  - Eat 3 small meals and 1-2 healthy, protein rich, snacks per day.    EAT PROTEIN FIRST AT MEALS, 2nd non starchy vegetable or fruit serving, consume starches last and aim for whole grains of small portion.  This pattern of eating ensures you are getting full on high " "quality protein and higher fiber foods with many vitamins and minerals to support adequate nutrition first.      2. Exercise Recommendations:    Regular physical activity is CRITICAL for long term successful weight management.    Strive for a minimum weekly exercise goal of at least 200 minutes per week of aerobic exercise (45 minutes at least 5 days per week or 30 minutes daily)    Strength based resistance training is critical for helping to maintain and build lean body mass/muscle mass which is very important for long term health.  Having higher muscle mass is associated with better health outcomes and can help to maintain higher calorie expenditure.      Designing a Strength Program:  Select 3-4 exercises that target different major muscle groups.  - Emphasize the following movements \"pull, push, squat, hip hinge\"  \"Pull\" examples - pull up, bent over row or dumbbell row, pull down with weight bar or resistance band  \"Push\" examples - push up, bench press, chest flys, dips, overhead press, dumbbell bench press  \"Squat\" examples - air squat, chair squat, lunge steps, goblet squat, front squat, etc  \"Hip hinge\" examples - kettle bell swing, good morning, glute bridge, deadlift, suitcase pick ups, hip thrust    Perform two to three sets of eight to 15 repetitions of each exercise.  Try to use a resistance that feels like an \"8 out of 10\" effort, with 10 being the highest effort you can give.    To get stronger, try increasing either the weight, number of repetitions, number of sets or number of exercises every 4-8 weeks as you feel more comfortable with your current program.      3. Fluids  - Drink at least 60 oz of water every day.   - Wait 30 minutes before or after meals to drink fluids.  - Avoid carbonated beverages.    4. Vitamins  - Remember to take your multivitamins 2 times daily, once in the morning and once in the evening  - Take your calcium 2-3 times daily, at least 2 hours apart from the multivitamin - " total daily dose at least 1200-1500mg per day.    - Vitamin D3 3000 units per day  - B12 - depending on your blood work and how well you absorb B12 from your multivitamin you may need additional B12 of 350-500mcg oral per day.    5. Labs  - We will check labs today and results will be communicated via [facility] My Chart  - A copy of the results can be viewed on  My Chart.      Follow-up with Dietitian for bariatric diet optimization  Follow-up with PCP for general health questions and ongoing management of routine health concerns

## 2024-04-17 ENCOUNTER — APPOINTMENT (OUTPATIENT)
Dept: GASTROENTEROLOGY | Facility: HOSPITAL | Age: 64
DRG: 417 | End: 2024-04-17
Payer: COMMERCIAL

## 2024-04-17 ENCOUNTER — HOSPITAL ENCOUNTER (INPATIENT)
Facility: HOSPITAL | Age: 64
LOS: 15 days | Discharge: HOME | DRG: 417 | End: 2024-05-02
Attending: SURGERY | Admitting: SURGERY
Payer: COMMERCIAL

## 2024-04-17 ENCOUNTER — APPOINTMENT (OUTPATIENT)
Dept: RADIOLOGY | Facility: HOSPITAL | Age: 64
DRG: 417 | End: 2024-04-17
Payer: COMMERCIAL

## 2024-04-17 ENCOUNTER — ANESTHESIA EVENT (OUTPATIENT)
Dept: OPERATING ROOM | Facility: HOSPITAL | Age: 64
DRG: 417 | End: 2024-04-17
Payer: COMMERCIAL

## 2024-04-17 ENCOUNTER — ANESTHESIA (OUTPATIENT)
Dept: OPERATING ROOM | Facility: HOSPITAL | Age: 64
DRG: 417 | End: 2024-04-17
Payer: COMMERCIAL

## 2024-04-17 DIAGNOSIS — K80.60 CALCULUS OF GALLBLADDER AND BILE DUCT WITH CHOLECYSTITIS WITHOUT OBSTRUCTION, UNSPECIFIED CHOLECYSTITIS ACUITY: ICD-10-CM

## 2024-04-17 DIAGNOSIS — K80.42 CHOLEDOCHOLITHIASIS WITH ACUTE CHOLECYSTITIS: ICD-10-CM

## 2024-04-17 DIAGNOSIS — K83.9 BILE LEAK: ICD-10-CM

## 2024-04-17 DIAGNOSIS — K80.20 GALLSTONES: Primary | ICD-10-CM

## 2024-04-17 DIAGNOSIS — K81.9 CHOLECYSTITIS: ICD-10-CM

## 2024-04-17 DIAGNOSIS — L02.211 ABDOMINAL WALL ABSCESS: ICD-10-CM

## 2024-04-17 DIAGNOSIS — Z90.49 S/P CHOLECYSTECTOMY: ICD-10-CM

## 2024-04-17 DIAGNOSIS — M79.89 OTHER SPECIFIED SOFT TISSUE DISORDERS: ICD-10-CM

## 2024-04-17 LAB
ABO GROUP (TYPE) IN BLOOD: NORMAL
RH FACTOR (ANTIGEN D): NORMAL

## 2024-04-17 PROCEDURE — 0FT44ZZ RESECTION OF GALLBLADDER, PERCUTANEOUS ENDOSCOPIC APPROACH: ICD-10-PCS | Performed by: SURGERY

## 2024-04-17 PROCEDURE — 2500000004 HC RX 250 GENERAL PHARMACY W/ HCPCS (ALT 636 FOR OP/ED): Mod: JZ | Performed by: STUDENT IN AN ORGANIZED HEALTH CARE EDUCATION/TRAINING PROGRAM

## 2024-04-17 PROCEDURE — 74300 X-RAY BILE DUCTS/PANCREAS: CPT | Performed by: RADIOLOGY

## 2024-04-17 PROCEDURE — 3700000001 HC GENERAL ANESTHESIA TIME - INITIAL BASE CHARGE: Performed by: SURGERY

## 2024-04-17 PROCEDURE — 0FC78ZZ EXTIRPATION OF MATTER FROM COMMON HEPATIC DUCT, VIA NATURAL OR ARTIFICIAL OPENING ENDOSCOPIC: ICD-10-PCS | Performed by: SURGERY

## 2024-04-17 PROCEDURE — 43870 CLOSURE GASTROSTOMY SURGICAL: CPT | Performed by: SURGERY

## 2024-04-17 PROCEDURE — 43264 ERCP REMOVE DUCT CALCULI: CPT | Performed by: STUDENT IN AN ORGANIZED HEALTH CARE EDUCATION/TRAINING PROGRAM

## 2024-04-17 PROCEDURE — 76000 FLUOROSCOPY <1 HR PHYS/QHP: CPT

## 2024-04-17 PROCEDURE — 2720000007 HC OR 272 NO HCPCS

## 2024-04-17 PROCEDURE — BF131ZZ FLUOROSCOPY OF GALLBLADDER AND BILE DUCTS USING LOW OSMOLAR CONTRAST: ICD-10-PCS | Performed by: SURGERY

## 2024-04-17 PROCEDURE — 3600000003 HC OR TIME - INITIAL BASE CHARGE - PROCEDURE LEVEL THREE: Performed by: SURGERY

## 2024-04-17 PROCEDURE — A4217 STERILE WATER/SALINE, 500 ML: HCPCS | Performed by: SURGERY

## 2024-04-17 PROCEDURE — 2500000004 HC RX 250 GENERAL PHARMACY W/ HCPCS (ALT 636 FOR OP/ED): Performed by: ANESTHESIOLOGY

## 2024-04-17 PROCEDURE — A47562 PR LAP,CHOLECYSTECTOMY: Performed by: ANESTHESIOLOGY

## 2024-04-17 PROCEDURE — A47562 PR LAP,CHOLECYSTECTOMY: Performed by: NURSE ANESTHETIST, CERTIFIED REGISTERED

## 2024-04-17 PROCEDURE — 43262 ENDO CHOLANGIOPANCREATOGRAPH: CPT | Performed by: STUDENT IN AN ORGANIZED HEALTH CARE EDUCATION/TRAINING PROGRAM

## 2024-04-17 PROCEDURE — 74328 X-RAY BILE DUCT ENDOSCOPY: CPT | Performed by: RADIOLOGY

## 2024-04-17 PROCEDURE — 2780000003 HC OR 278 NO HCPCS: Performed by: SURGERY

## 2024-04-17 PROCEDURE — 2500000004 HC RX 250 GENERAL PHARMACY W/ HCPCS (ALT 636 FOR OP/ED): Performed by: SURGERY

## 2024-04-17 PROCEDURE — 1100000001 HC PRIVATE ROOM DAILY

## 2024-04-17 PROCEDURE — 2500000004 HC RX 250 GENERAL PHARMACY W/ HCPCS (ALT 636 FOR OP/ED): Performed by: NURSE ANESTHETIST, CERTIFIED REGISTERED

## 2024-04-17 PROCEDURE — 36415 COLL VENOUS BLD VENIPUNCTURE: CPT | Performed by: SURGERY

## 2024-04-17 PROCEDURE — 3700000002 HC GENERAL ANESTHESIA TIME - EACH INCREMENTAL 1 MINUTE: Performed by: SURGERY

## 2024-04-17 PROCEDURE — 2500000005 HC RX 250 GENERAL PHARMACY W/O HCPCS: Performed by: NURSE ANESTHETIST, CERTIFIED REGISTERED

## 2024-04-17 PROCEDURE — 88304 TISSUE EXAM BY PATHOLOGIST: CPT | Mod: TC,PARLAB | Performed by: SURGERY

## 2024-04-17 PROCEDURE — 0FC98ZZ EXTIRPATION OF MATTER FROM COMMON BILE DUCT, VIA NATURAL OR ARTIFICIAL OPENING ENDOSCOPIC: ICD-10-PCS | Performed by: SURGERY

## 2024-04-17 PROCEDURE — 88304 TISSUE EXAM BY PATHOLOGIST: CPT | Performed by: STUDENT IN AN ORGANIZED HEALTH CARE EDUCATION/TRAINING PROGRAM

## 2024-04-17 PROCEDURE — C1769 GUIDE WIRE: HCPCS

## 2024-04-17 PROCEDURE — 7100000002 HC RECOVERY ROOM TIME - EACH INCREMENTAL 1 MINUTE: Performed by: SURGERY

## 2024-04-17 PROCEDURE — 96372 THER/PROPH/DIAG INJ SC/IM: CPT | Performed by: STUDENT IN AN ORGANIZED HEALTH CARE EDUCATION/TRAINING PROGRAM

## 2024-04-17 PROCEDURE — 47564 LAPARO CHOLECYSTECTOMY/EXPLR: CPT | Performed by: SURGERY

## 2024-04-17 PROCEDURE — 2720000007 HC OR 272 NO HCPCS: Performed by: SURGERY

## 2024-04-17 PROCEDURE — 43264 ERCP REMOVE DUCT CALCULI: CPT

## 2024-04-17 PROCEDURE — 7100000001 HC RECOVERY ROOM TIME - INITIAL BASE CHARGE: Performed by: SURGERY

## 2024-04-17 PROCEDURE — 2500000004 HC RX 250 GENERAL PHARMACY W/ HCPCS (ALT 636 FOR OP/ED): Performed by: STUDENT IN AN ORGANIZED HEALTH CARE EDUCATION/TRAINING PROGRAM

## 2024-04-17 PROCEDURE — 3600000008 HC OR TIME - EACH INCREMENTAL 1 MINUTE - PROCEDURE LEVEL THREE: Performed by: SURGERY

## 2024-04-17 RX ORDER — LIDOCAINE HCL/PF 100 MG/5ML
SYRINGE (ML) INTRAVENOUS AS NEEDED
Status: DISCONTINUED | OUTPATIENT
Start: 2024-04-17 | End: 2024-04-17

## 2024-04-17 RX ORDER — ONDANSETRON HYDROCHLORIDE 2 MG/ML
4 INJECTION, SOLUTION INTRAVENOUS EVERY 8 HOURS PRN
Status: DISCONTINUED | OUTPATIENT
Start: 2024-04-17 | End: 2024-05-02 | Stop reason: HOSPADM

## 2024-04-17 RX ORDER — SODIUM CHLORIDE, SODIUM LACTATE, POTASSIUM CHLORIDE, CALCIUM CHLORIDE 600; 310; 30; 20 MG/100ML; MG/100ML; MG/100ML; MG/100ML
100 INJECTION, SOLUTION INTRAVENOUS CONTINUOUS
Status: DISCONTINUED | OUTPATIENT
Start: 2024-04-17 | End: 2024-04-20

## 2024-04-17 RX ORDER — OXYCODONE HCL 5 MG/5 ML
5 SOLUTION, ORAL ORAL EVERY 6 HOURS PRN
Status: DISCONTINUED | OUTPATIENT
Start: 2024-04-17 | End: 2024-04-18

## 2024-04-17 RX ORDER — MIDAZOLAM HYDROCHLORIDE 1 MG/ML
1 INJECTION, SOLUTION INTRAMUSCULAR; INTRAVENOUS ONCE AS NEEDED
Status: DISCONTINUED | OUTPATIENT
Start: 2024-04-17 | End: 2024-04-17 | Stop reason: HOSPADM

## 2024-04-17 RX ORDER — ACETAMINOPHEN 160 MG/5ML
650 SOLUTION ORAL EVERY 4 HOURS PRN
Status: DISCONTINUED | OUTPATIENT
Start: 2024-04-17 | End: 2024-04-18

## 2024-04-17 RX ORDER — ONDANSETRON 4 MG/1
4 TABLET, ORALLY DISINTEGRATING ORAL EVERY 8 HOURS PRN
Status: DISCONTINUED | OUTPATIENT
Start: 2024-04-17 | End: 2024-05-02 | Stop reason: HOSPADM

## 2024-04-17 RX ORDER — INDOCYANINE GREEN AND WATER 25 MG
2.5 KIT INJECTION ONCE
Status: DISCONTINUED | OUTPATIENT
Start: 2024-04-17 | End: 2024-04-17 | Stop reason: HOSPADM

## 2024-04-17 RX ORDER — ONDANSETRON HYDROCHLORIDE 2 MG/ML
INJECTION, SOLUTION INTRAVENOUS AS NEEDED
Status: DISCONTINUED | OUTPATIENT
Start: 2024-04-17 | End: 2024-04-17

## 2024-04-17 RX ORDER — CEFAZOLIN SODIUM 2 G/100ML
2 INJECTION, SOLUTION INTRAVENOUS ONCE
Status: COMPLETED | OUTPATIENT
Start: 2024-04-17 | End: 2024-04-17

## 2024-04-17 RX ORDER — MEPERIDINE HYDROCHLORIDE 25 MG/ML
12.5 INJECTION INTRAMUSCULAR; INTRAVENOUS; SUBCUTANEOUS EVERY 10 MIN PRN
Status: DISCONTINUED | OUTPATIENT
Start: 2024-04-17 | End: 2024-04-17 | Stop reason: HOSPADM

## 2024-04-17 RX ORDER — HEPARIN SODIUM 5000 [USP'U]/ML
5000 INJECTION, SOLUTION INTRAVENOUS; SUBCUTANEOUS EVERY 8 HOURS
Status: DISCONTINUED | OUTPATIENT
Start: 2024-04-17 | End: 2024-05-02 | Stop reason: HOSPADM

## 2024-04-17 RX ORDER — SODIUM CHLORIDE 0.9 G/100ML
IRRIGANT IRRIGATION AS NEEDED
Status: DISCONTINUED | OUTPATIENT
Start: 2024-04-17 | End: 2024-04-17 | Stop reason: HOSPADM

## 2024-04-17 RX ORDER — ROCURONIUM BROMIDE 10 MG/ML
INJECTION, SOLUTION INTRAVENOUS AS NEEDED
Status: DISCONTINUED | OUTPATIENT
Start: 2024-04-17 | End: 2024-04-17

## 2024-04-17 RX ORDER — MIDAZOLAM HYDROCHLORIDE 1 MG/ML
INJECTION, SOLUTION INTRAMUSCULAR; INTRAVENOUS AS NEEDED
Status: DISCONTINUED | OUTPATIENT
Start: 2024-04-17 | End: 2024-04-17

## 2024-04-17 RX ORDER — BUPIVACAINE HYDROCHLORIDE 2.5 MG/ML
INJECTION, SOLUTION INFILTRATION; PERINEURAL AS NEEDED
Status: DISCONTINUED | OUTPATIENT
Start: 2024-04-17 | End: 2024-04-17 | Stop reason: HOSPADM

## 2024-04-17 RX ORDER — FENTANYL CITRATE 50 UG/ML
INJECTION, SOLUTION INTRAMUSCULAR; INTRAVENOUS AS NEEDED
Status: DISCONTINUED | OUTPATIENT
Start: 2024-04-17 | End: 2024-04-17

## 2024-04-17 RX ORDER — SODIUM CHLORIDE, SODIUM LACTATE, POTASSIUM CHLORIDE, CALCIUM CHLORIDE 600; 310; 30; 20 MG/100ML; MG/100ML; MG/100ML; MG/100ML
100 INJECTION, SOLUTION INTRAVENOUS CONTINUOUS
Status: DISCONTINUED | OUTPATIENT
Start: 2024-04-17 | End: 2024-04-17 | Stop reason: HOSPADM

## 2024-04-17 RX ORDER — KETOROLAC TROMETHAMINE 30 MG/ML
INJECTION, SOLUTION INTRAMUSCULAR; INTRAVENOUS
Status: DISPENSED
Start: 2024-04-17 | End: 2024-04-18

## 2024-04-17 RX ORDER — ONDANSETRON HYDROCHLORIDE 2 MG/ML
4 INJECTION, SOLUTION INTRAVENOUS ONCE AS NEEDED
Status: DISCONTINUED | OUTPATIENT
Start: 2024-04-17 | End: 2024-04-17 | Stop reason: HOSPADM

## 2024-04-17 RX ORDER — DIPHENHYDRAMINE HYDROCHLORIDE 50 MG/ML
12.5 INJECTION INTRAMUSCULAR; INTRAVENOUS ONCE AS NEEDED
Status: DISCONTINUED | OUTPATIENT
Start: 2024-04-17 | End: 2024-04-17 | Stop reason: HOSPADM

## 2024-04-17 RX ORDER — HYDROMORPHONE HYDROCHLORIDE 1 MG/ML
1 INJECTION, SOLUTION INTRAMUSCULAR; INTRAVENOUS; SUBCUTANEOUS EVERY 5 MIN PRN
Status: DISCONTINUED | OUTPATIENT
Start: 2024-04-17 | End: 2024-04-17 | Stop reason: HOSPADM

## 2024-04-17 RX ORDER — ACETAMINOPHEN 325 MG/1
650 TABLET ORAL EVERY 4 HOURS PRN
Status: DISCONTINUED | OUTPATIENT
Start: 2024-04-17 | End: 2024-04-18

## 2024-04-17 RX ORDER — HEPARIN SODIUM 5000 [USP'U]/ML
5000 INJECTION, SOLUTION INTRAVENOUS; SUBCUTANEOUS ONCE
Status: COMPLETED | OUTPATIENT
Start: 2024-04-17 | End: 2024-04-17

## 2024-04-17 RX ORDER — PROPOFOL 10 MG/ML
INJECTION, EMULSION INTRAVENOUS AS NEEDED
Status: DISCONTINUED | OUTPATIENT
Start: 2024-04-17 | End: 2024-04-17

## 2024-04-17 RX ORDER — PROMETHAZINE HYDROCHLORIDE 25 MG/ML
25 INJECTION, SOLUTION INTRAMUSCULAR; INTRAVENOUS ONCE
Status: CANCELLED | OUTPATIENT
Start: 2024-04-17 | End: 2024-04-17

## 2024-04-17 RX ORDER — KETOROLAC TROMETHAMINE 30 MG/ML
30 INJECTION, SOLUTION INTRAMUSCULAR; INTRAVENOUS ONCE
Status: COMPLETED | OUTPATIENT
Start: 2024-04-17 | End: 2024-04-17

## 2024-04-17 RX ORDER — ACETAMINOPHEN 650 MG/1
650 SUPPOSITORY RECTAL EVERY 4 HOURS PRN
Status: DISCONTINUED | OUTPATIENT
Start: 2024-04-17 | End: 2024-04-18

## 2024-04-17 RX ORDER — HYDRALAZINE HYDROCHLORIDE 20 MG/ML
5 INJECTION INTRAMUSCULAR; INTRAVENOUS EVERY 30 MIN PRN
Status: DISCONTINUED | OUTPATIENT
Start: 2024-04-17 | End: 2024-04-17 | Stop reason: HOSPADM

## 2024-04-17 RX ORDER — LABETALOL HYDROCHLORIDE 5 MG/ML
5 INJECTION, SOLUTION INTRAVENOUS ONCE AS NEEDED
Status: DISCONTINUED | OUTPATIENT
Start: 2024-04-17 | End: 2024-04-17 | Stop reason: HOSPADM

## 2024-04-17 RX ORDER — OXYCODONE HCL 5 MG/5 ML
10 SOLUTION, ORAL ORAL EVERY 6 HOURS PRN
Status: DISCONTINUED | OUTPATIENT
Start: 2024-04-17 | End: 2024-04-18

## 2024-04-17 RX ORDER — SCOLOPAMINE TRANSDERMAL SYSTEM 1 MG/1
1 PATCH, EXTENDED RELEASE TRANSDERMAL ONCE
Status: COMPLETED | OUTPATIENT
Start: 2024-04-17 | End: 2024-04-20

## 2024-04-17 RX ORDER — ACETAMINOPHEN 325 MG/1
650 TABLET ORAL EVERY 4 HOURS PRN
Status: DISCONTINUED | OUTPATIENT
Start: 2024-04-17 | End: 2024-04-17 | Stop reason: HOSPADM

## 2024-04-17 RX ADMIN — PROPOFOL 50 MG: 10 INJECTION, EMULSION INTRAVENOUS at 12:41

## 2024-04-17 RX ADMIN — FENTANYL CITRATE 100 MCG: 50 INJECTION, SOLUTION INTRAMUSCULAR; INTRAVENOUS at 12:08

## 2024-04-17 RX ADMIN — PROPOFOL 150 MG: 10 INJECTION, EMULSION INTRAVENOUS at 12:08

## 2024-04-17 RX ADMIN — ONDANSETRON 4 MG: 2 INJECTION INTRAMUSCULAR; INTRAVENOUS at 17:04

## 2024-04-17 RX ADMIN — ROCURONIUM BROMIDE 50 MG: 10 INJECTION, SOLUTION INTRAVENOUS at 12:09

## 2024-04-17 RX ADMIN — SODIUM CHLORIDE, SODIUM LACTATE, POTASSIUM CHLORIDE, AND CALCIUM CHLORIDE: 600; 310; 30; 20 INJECTION, SOLUTION INTRAVENOUS at 11:52

## 2024-04-17 RX ADMIN — ROCURONIUM BROMIDE 10 MG: 10 INJECTION, SOLUTION INTRAVENOUS at 14:58

## 2024-04-17 RX ADMIN — SCOPOLAMINE 1 PATCH: 1.5 PATCH, EXTENDED RELEASE TRANSDERMAL at 11:18

## 2024-04-17 RX ADMIN — ROCURONIUM BROMIDE 10 MG: 10 INJECTION, SOLUTION INTRAVENOUS at 13:12

## 2024-04-17 RX ADMIN — ROCURONIUM BROMIDE 10 MG: 10 INJECTION, SOLUTION INTRAVENOUS at 16:48

## 2024-04-17 RX ADMIN — ROCURONIUM BROMIDE 10 MG: 10 INJECTION, SOLUTION INTRAVENOUS at 13:21

## 2024-04-17 RX ADMIN — FENTANYL CITRATE 50 MCG: 50 INJECTION, SOLUTION INTRAMUSCULAR; INTRAVENOUS at 12:36

## 2024-04-17 RX ADMIN — MIDAZOLAM 2 MG: 1 INJECTION INTRAMUSCULAR; INTRAVENOUS at 12:02

## 2024-04-17 RX ADMIN — Medication 12.5 MG: at 19:05

## 2024-04-17 RX ADMIN — ROCURONIUM BROMIDE 10 MG: 10 INJECTION, SOLUTION INTRAVENOUS at 16:36

## 2024-04-17 RX ADMIN — FENTANYL CITRATE 50 MCG: 50 INJECTION, SOLUTION INTRAMUSCULAR; INTRAVENOUS at 12:40

## 2024-04-17 RX ADMIN — SUGAMMADEX 200 MG: 100 INJECTION, SOLUTION INTRAVENOUS at 17:07

## 2024-04-17 RX ADMIN — SODIUM CHLORIDE, SODIUM LACTATE, POTASSIUM CHLORIDE, AND CALCIUM CHLORIDE 100 ML/HR: 600; 310; 30; 20 INJECTION, SOLUTION INTRAVENOUS at 11:19

## 2024-04-17 RX ADMIN — DEXAMETHASONE SODIUM PHOSPHATE 4 MG: 4 INJECTION, SOLUTION INTRAMUSCULAR; INTRAVENOUS at 12:21

## 2024-04-17 RX ADMIN — HYDROMORPHONE HYDROCHLORIDE 0.5 MG: 2 INJECTION, SOLUTION INTRAMUSCULAR; INTRAVENOUS; SUBCUTANEOUS at 17:13

## 2024-04-17 RX ADMIN — HYDROMORPHONE HYDROCHLORIDE 1 MG: 2 INJECTION, SOLUTION INTRAMUSCULAR; INTRAVENOUS; SUBCUTANEOUS at 17:32

## 2024-04-17 RX ADMIN — LIDOCAINE HYDROCHLORIDE 60 MG: 20 INJECTION INTRAVENOUS at 12:08

## 2024-04-17 RX ADMIN — CEFAZOLIN SODIUM 2 G: 2 INJECTION, SOLUTION INTRAVENOUS at 12:13

## 2024-04-17 RX ADMIN — SODIUM CHLORIDE, SODIUM LACTATE, POTASSIUM CHLORIDE, AND CALCIUM CHLORIDE: 600; 310; 30; 20 INJECTION, SOLUTION INTRAVENOUS at 17:12

## 2024-04-17 RX ADMIN — HEPARIN SODIUM 5000 UNITS: 5000 INJECTION INTRAVENOUS; SUBCUTANEOUS at 11:18

## 2024-04-17 RX ADMIN — KETOROLAC TROMETHAMINE 30 MG: 30 INJECTION, SOLUTION INTRAMUSCULAR; INTRAVENOUS at 18:41

## 2024-04-17 RX ADMIN — ROCURONIUM BROMIDE 20 MG: 10 INJECTION, SOLUTION INTRAVENOUS at 13:39

## 2024-04-17 RX ADMIN — HYDROMORPHONE HYDROCHLORIDE 0.5 MG: 2 INJECTION, SOLUTION INTRAMUSCULAR; INTRAVENOUS; SUBCUTANEOUS at 17:09

## 2024-04-17 RX ADMIN — HYDROMORPHONE HYDROCHLORIDE 1 MG: 1 INJECTION, SOLUTION INTRAMUSCULAR; INTRAVENOUS; SUBCUTANEOUS at 18:05

## 2024-04-17 RX ADMIN — CEFAZOLIN SODIUM 2 G: 2 INJECTION, SOLUTION INTRAVENOUS at 16:20

## 2024-04-17 SDOH — SOCIAL STABILITY: SOCIAL INSECURITY: ARE THERE ANY APPARENT SIGNS OF INJURIES/BEHAVIORS THAT COULD BE RELATED TO ABUSE/NEGLECT?: NO

## 2024-04-17 SDOH — SOCIAL STABILITY: SOCIAL INSECURITY: DO YOU FEEL ANYONE HAS EXPLOITED OR TAKEN ADVANTAGE OF YOU FINANCIALLY OR OF YOUR PERSONAL PROPERTY?: NO

## 2024-04-17 SDOH — SOCIAL STABILITY: SOCIAL INSECURITY: ABUSE: ADULT

## 2024-04-17 SDOH — SOCIAL STABILITY: SOCIAL INSECURITY: ARE YOU OR HAVE YOU BEEN THREATENED OR ABUSED PHYSICALLY, EMOTIONALLY, OR SEXUALLY BY ANYONE?: NO

## 2024-04-17 SDOH — ECONOMIC STABILITY: HOUSING INSECURITY: IN THE LAST 12 MONTHS, HOW MANY PLACES HAVE YOU LIVED?: 1

## 2024-04-17 SDOH — SOCIAL STABILITY: SOCIAL INSECURITY: DO YOU FEEL UNSAFE GOING BACK TO THE PLACE WHERE YOU ARE LIVING?: NO

## 2024-04-17 SDOH — SOCIAL STABILITY: SOCIAL INSECURITY: HAVE YOU HAD ANY THOUGHTS OF HARMING ANYONE ELSE?: NO

## 2024-04-17 SDOH — HEALTH STABILITY: MENTAL HEALTH: CURRENT SMOKER: 0

## 2024-04-17 SDOH — SOCIAL STABILITY: SOCIAL INSECURITY: DOES ANYONE TRY TO KEEP YOU FROM HAVING/CONTACTING OTHER FRIENDS OR DOING THINGS OUTSIDE YOUR HOME?: NO

## 2024-04-17 SDOH — SOCIAL STABILITY: SOCIAL INSECURITY: HAS ANYONE EVER THREATENED TO HURT YOUR FAMILY OR YOUR PETS?: NO

## 2024-04-17 SDOH — SOCIAL STABILITY: SOCIAL INSECURITY: HAVE YOU HAD THOUGHTS OF HARMING ANYONE ELSE?: NO

## 2024-04-17 ASSESSMENT — COGNITIVE AND FUNCTIONAL STATUS - GENERAL
TOILETING: A LITTLE
HELP NEEDED FOR BATHING: A LITTLE
PATIENT BASELINE BEDBOUND: NO
DAILY ACTIVITIY SCORE: 20
WALKING IN HOSPITAL ROOM: A LITTLE
DRESSING REGULAR UPPER BODY CLOTHING: A LITTLE
STANDING UP FROM CHAIR USING ARMS: A LITTLE
MOVING TO AND FROM BED TO CHAIR: A LITTLE
CLIMB 3 TO 5 STEPS WITH RAILING: A LITTLE
MOVING FROM LYING ON BACK TO SITTING ON SIDE OF FLAT BED WITH BEDRAILS: A LITTLE
TURNING FROM BACK TO SIDE WHILE IN FLAT BAD: A LITTLE
MOBILITY SCORE: 18
DRESSING REGULAR LOWER BODY CLOTHING: A LITTLE

## 2024-04-17 ASSESSMENT — ACTIVITIES OF DAILY LIVING (ADL)
DRESSING YOURSELF: NEEDS ASSISTANCE
HEARING - LEFT EAR: FUNCTIONAL
PATIENT'S MEMORY ADEQUATE TO SAFELY COMPLETE DAILY ACTIVITIES?: YES
BATHING: INDEPENDENT
LACK_OF_TRANSPORTATION: NO
ADEQUATE_TO_COMPLETE_ADL: YES
GROOMING: INDEPENDENT
TOILETING: INDEPENDENT
ASSISTIVE_DEVICE: CANE;WALKER
FEEDING YOURSELF: INDEPENDENT
WALKS IN HOME: NEEDS ASSISTANCE
HEARING - RIGHT EAR: FUNCTIONAL
JUDGMENT_ADEQUATE_SAFELY_COMPLETE_DAILY_ACTIVITIES: YES

## 2024-04-17 ASSESSMENT — PAIN DESCRIPTION - LOCATION
LOCATION: BACK
LOCATION: ABDOMEN

## 2024-04-17 ASSESSMENT — COLUMBIA-SUICIDE SEVERITY RATING SCALE - C-SSRS
1. IN THE PAST MONTH, HAVE YOU WISHED YOU WERE DEAD OR WISHED YOU COULD GO TO SLEEP AND NOT WAKE UP?: NO
6. HAVE YOU EVER DONE ANYTHING, STARTED TO DO ANYTHING, OR PREPARED TO DO ANYTHING TO END YOUR LIFE?: NO
2. HAVE YOU ACTUALLY HAD ANY THOUGHTS OF KILLING YOURSELF?: NO

## 2024-04-17 ASSESSMENT — PAIN SCALES - GENERAL
PAINLEVEL_OUTOF10: 10 - WORST POSSIBLE PAIN
PAINLEVEL_OUTOF10: 10 - WORST POSSIBLE PAIN
PAIN_LEVEL: 3
PAINLEVEL_OUTOF10: 5 - MODERATE PAIN

## 2024-04-17 ASSESSMENT — LIFESTYLE VARIABLES
AUDIT-C TOTAL SCORE: 3
HOW OFTEN DO YOU HAVE 6 OR MORE DRINKS ON ONE OCCASION: NEVER
SKIP TO QUESTIONS 9-10: 1
HOW OFTEN DO YOU HAVE A DRINK CONTAINING ALCOHOL: 2-3 TIMES A WEEK
AUDIT-C TOTAL SCORE: 3
HOW MANY STANDARD DRINKS CONTAINING ALCOHOL DO YOU HAVE ON A TYPICAL DAY: 1 OR 2

## 2024-04-17 ASSESSMENT — PAIN - FUNCTIONAL ASSESSMENT
PAIN_FUNCTIONAL_ASSESSMENT: 0-10

## 2024-04-17 NOTE — PERIOPERATIVE NURSING NOTE
Dr. Bedoya in to see pt, he did speak with her about her surgery. Pt expressed to him her pain level, neck pain, sciatic pain.

## 2024-04-17 NOTE — OP NOTE
LAPAROSCOPIC CHOLECYSTECTOMY WITH OPERATIVE CHOLANGIOGRAMS & C-ARM/ GASTROSTOMY FOR ERCP/CLOSURE OF GASTROSTOMY/CHOLEDUCOSCOPE Operative Note     Date: 2024  OR Location: [facility]    Name: Mandy Ramirez : 1960, Age: 63 y.o., MRN: [id number], Sex: female    Diagnosis  Pre-op Diagnosis     * Cholecystitis [K81.9] Post-op Diagnosis     * Cholecystitis [K81.9]     Procedures  LAPAROSCOPIC CHOLECYSTECTOMY WITH OPERATIVE CHOLANGIOGRAMS & C-ARM/ GASTROSTOMY FOR ERCP/CLOSURE OF GASTROSTOMY/CHOLEDUCOSCOPE  60183 - MT LAPAROSCOPY SURG CHOLECYSTECTOMY    Cholangiopancreatography Endoscopy Retrograde  28953 - MT ERCP DX COLLECTION SPECIMEN BRUSHING/WASHING    MT EGD TRANSORAL BIOPSY SINGLE/MULTIPLE [66881]  MT LAPS RPR PARAESPHGL HRNA INCL FUNDPLSTY W/MESH [85602]  Surgeons   Panel 1:     * Denise Bedoya - Primary  Panel 2:     * Jhonathan Fung - Primary    Resident/Fellow/Other Assistant:  Surgeons and Role:  Panel 1:     * Jhonathan Fung MD - Assisting     * Jenny Junior MD - Assisting    Procedure Summary  Anesthesia: General  ASA: II  Anesthesia Staff: Anesthesiologist: Horacio Yao MD  CRNA: YUDITH Jackson-CRNA  Estimated Blood Loss: 50  mL  Intra-op Medications:   Administrations occurring from 1200 to 1400 on 24:   Medication Name Total Dose   ceFAZolin in dextrose (iso-os) (Ancef) IVPB 2 g 2 g              Anesthesia Record               Intraprocedure I/O Totals          Intake    lactated Ringer's infusion 1000.00 mL    ceFAZolin in dextrose (iso-os) (Ancef) IVPB 2 g 100.00 mL    Total Intake 1100 mL          Specimen:   ID Type Source Tests Collected by Time   1 : GALL BLADDER AND STONES Tissue GALLBLADDER CHOLECYSTECTOMY SURGICAL PATHOLOGY EXAM Denise Bedoya MD 2024 [id number]        Staff:   Circulator: Keren Ng RN; Yanira Krishna RN; Neda Shepard RN; Melissa Castaneda RN  Relief Scrub: Melissa Castaneda RN  Scrub Person: Ceasar Lundberg; Denis Carbajal         Drains and/or  Catheters:   Closed/Suction Drain 1 RLQ 19 Fr. (Active)       [REMOVED] Urethral Catheter Other (Comment) (Removed)       Tourniquet Times:         Implants:     Findings: inflammed, necrotic gall bladder, numerous large stones, choledocholithiasis,     Indications: Mandy Ramirez is an 63 y.o. female who is having surgery for Cholecystitis [K81.9]. Cholelithiasis, history of gastric bypass     The patient was seen in the preoperative area. The risks, benefits, complications, treatment options, non-operative alternatives, expected recovery and outcomes were discussed with the patient. The possibilities of reaction to medication, pulmonary aspiration, injury to surrounding structures, bleeding, recurrent infection, the need for additional procedures, failure to diagnose a condition, and creating a complication requiring transfusion or operation were discussed with the patient. The patient concurred with the proposed plan, giving informed consent.  The site of surgery was properly noted/marked if necessary per policy. The patient has been actively warmed in preoperative area. Preoperative antibiotics have been ordered and given within 1 hours of incision. Venous thrombosis prophylaxis have been ordered including bilateral sequential compression devices and chemical prophylaxis    Procedure Details: Patient is a 63-year-old female with history of gastric bypass who is being evaluated for cholelithiasis and weight regain issues by gastroenterology for possible stoma reduction and Beltran's esophagus and reflux issues.  After lengthy evaluations and discussions patient eventually decided to proceed only with cholecystectomy and not do any bariatric procedure.  She was electively scheduled and on the day of surgery after verification of informed consent she was given subcu heparin, after anesthesia evaluation was taken to the operating room, placed in supine position on the table, preoperative huddle was done, SCDs were  placed, antibiotics were given, general anesthesia was established, standard, sterile preparation and draping of abdominal wall was performed.  Entry into abdominal cavity was obtained using 5 mm optical trocar in the left midclavicular line without technical problems.  Pneumoperitoneum was established additional 5 mm port was placed on the right side and omental adhesions in the umbilical and epigastric area were taken down with LigaSure device.  12 mm port was placed in the periumbilical area.  Patient was placed in reverse Trendelenburg position gallbladder was noted to have adhesions to the omentum which were peeled off with blunt dissection and hemostasis from the omentum was obtained with LigaSure device.  Gallbladder wall was noted to be friable and partially ischemic and gallbladder itself was very firm initially we were worried if there was a mass in the gallbladder however during the retraction of the gallbladder fundus fundus wall broke down and multiple stones spilled out, all the stones were picked up one by one and removed.  To help retraction of the gallbladder more stones were removed from the gallbladder lumen thus augmenting the retraction however at infundibulum area tissue was inflamed and there was no plane between duodenum and cystic duct area.  Decision was made to do top down dissection and gallbladder was dissected from the liver bed using cautery till we reach down to the infundibulum area all the stones were removed gallbladder itself was then resected leaving the cystic duct intact.  Cystic artery was part of the gallbladder wall posteriorly and was cauterized with LigaSure device could not be dissected free.  We were able to get a cholangiogram catheter into the cystic duct stump and a cholangiogram was performed which revealed a large filling defect at the common hepatic duct area.  I was not able to get a choledocho scope through the cystic duct because of the inflammation and  tortuosity.  Gallbladder and stones were removed via Endo Catch through the 12 mm port site after extending the incision to accommodate passage of the specimen.  Intraoperative consultation was obtained from gastroenterologist and decision was made that it would be in the best interest of the patient to get a transgastric access to the duodenum and do an ERCP.  Gastroenterologist talk to the  of the patient in the recovery room and obtaining consent from the patients  to proceed with the proposed ERCP and transgastric approach.  A gastrotomy was made in the gastric remnant using LigaSure L-hook device and a 15 mm port was placed in the left subcostal margin and inserted into the gastric remnant and then remnant was secured with abdominal wall with 2-0 Polysorb sutures using Endo Stitch device.  ERCP with sphincterotomy was performed and after multiple attempts eventually the stone was retrieved.  It was a large stone.  After the stone retrieval imaging studies were negative for any more filling defect.  During the ERCP images contrast did not leak out from the cystic duct.  After ERCP was performed.  3 stay sutures were placed at the gastrotomy site and then using 60 mm green load with SEAMGUARD the gastrotomy was closed.  Now the cystic duct stump was oversewn with 2-0 Vicryl suture.  Copious irrigation aspiration was done of the operative field.  There was no residual stones, there was no active bleeding there was no evidence of any bile leakage.  19 [place] Humberto drain was placed through the left axillary port site and placed in the gallbladder fossa and under the left lobe of liver against the gastrotomy closure site.  Drain was secured with 3-0 nylon suture.  Fascial defect at 12 mm and 15 mm port sites were closed with interrupted 0 Vicryl sutures using Endo passer under laparoscopic vision.  After final satisfactory examination trocars were removed pneumoperitoneum was discontinued.  Skin was  closed with 3-0 Monocryl.  Dermabond was applied.  Straight catheterization was done to empty the bladder using sterile precautions.  Patient tolerated the operation well was hemodynamically stable throughout the operation and was extubated in the OR and transferred to recovery room in stable condition.  was updated with the findings and conduct of the operation.  Complications:  None; patient tolerated the procedure well.    Disposition: PACU - hemodynamically stable.  Condition: stable         Additional Details: 19 Fr Humberto drain in GB fossa.     Attending Attestation:     Denise Bedoya  Phone Number: [phone]

## 2024-04-17 NOTE — PERIOPERATIVE NURSING NOTE
Pt c/o of neck pain. Hob elevated for comfort. Enc pt to deep breathe. Enc pt to open eyes. She does follow commands.

## 2024-04-17 NOTE — PERIOPERATIVE NURSING NOTE
Pt continues to moan, she sleeps quietly for approximately 10 minutes and then wakes up moaning, stating pain is  a 10. She is also trying to cough. There is ice on her abdominal incisions. Enc pt to deep breath to help relax her.

## 2024-04-17 NOTE — ANESTHESIA POSTPROCEDURE EVALUATION
Patient: Mandy Ramirez    Procedure Summary       Date: 04/17/24 Room / Location: [facility] 09 / [facility]    Anesthesia Start: 1152 Anesthesia Stop:     Procedures:       LAPAROSCOPIC CHOLECYSTECTOMY WITH OPERATIVE CHOLANGIOGRAMS & C-ARM/ GASTROSTOMY FOR ERCP/CLOSURE OF GASTROSTOMY/CHOLEDUCOSCOPE (Abdomen)      Cholangiopancreatography Endoscopy Retrograde (Abdomen) Diagnosis:       Cholecystitis      (Cholecystitis [K81.9])    Surgeons: Denise Bedoya MD; Jhonathan Fung MD Responsible Provider: Horacio Yao MD    Anesthesia Type: general ASA Status: 2            Anesthesia Type: general    Vitals Value Taken Time   /84 04/17/24 1730   Temp 36.8 °C (98.2 °F) 04/17/24 1726   Pulse 85 04/17/24 1732   Resp 16 04/17/24 1726   SpO2 96 % 04/17/24 1732   Vitals shown include unfiled device data.    Anesthesia Post Evaluation    Patient location during evaluation: PACU  Patient participation: complete - patient participated  Level of consciousness: awake  Pain score: 3  Pain management: adequate  Airway patency: patent  Cardiovascular status: acceptable, blood pressure returned to baseline and hemodynamically stable  Respiratory status: acceptable and face mask  Hydration status: acceptable  Postoperative Nausea and Vomiting: none        There were no known notable events for this encounter.

## 2024-04-17 NOTE — ANESTHESIA PROCEDURE NOTES
Airway  Date/Time: 4/17/2024 12:10 PM  Urgency: elective    Airway not difficult    Staffing  Performed: CRNA   Authorized by: Horacio Yao MD    Performed by: YUDITH Jackson-ENOC  Patient location during procedure: OR    Indications and Patient Condition  Indications for airway management: anesthesia  Spontaneous Ventilation: absent  Sedation level: deep  Preoxygenated: yes  Patient position: sniffing  MILS maintained throughout  Mask difficulty assessment: 1 - vent by mask  Planned trial extubation    Final Airway Details  Final airway type: endotracheal airway      Successful airway: ETT  Cuffed: yes   Successful intubation technique: direct laryngoscopy  Facilitating devices/methods: intubating stylet  Endotracheal tube insertion site: oral  Blade: Isaiah  Blade size: #4  ETT size (mm): 7.0  Cormack-Lehane Classification: grade IIa - partial view of glottis  Placement verified by: chest auscultation and capnometry   Cuff volume (mL): 10  Measured from: lips  ETT to lips (cm): 22  Number of attempts at approach: 1  Ventilation between attempts: none  Number of other approaches attempted: 0    Additional Comments  Atraumatic.

## 2024-04-17 NOTE — PERIOPERATIVE NURSING NOTE
Pt has 5 port sites on abdomen all closed with Dermabond and open to air. Sites are clean and dry, no drainage. Pt has one GABRIEL drain site covered with a drain sponge that is also dry. Not all lap port sites are documented on the avatar.

## 2024-04-17 NOTE — ANESTHESIA PREPROCEDURE EVALUATION
Patient: Mandy Ramirez    Procedure Information       Date/Time: 04/17/24 1200    Procedure: LAPAROSCOPIC CHOLECYSTECTOMY WITH OPERATIVE CHOLANGIOGRAMS & C-ARM/ POSSIBLE OPEN    Location: [facility] 09 / [facility]    Surgeons: Denise Bedoya MD            Relevant Problems   Anesthesia (within normal limits)      Cardiac   (+) Hyperlipidemia      Neuro   (+) Depression, recurrent (CMS-HCC)   (+) Generalized anxiety disorder   (+) Right-sided low back pain with sciatica   (+) Severe episode of recurrent major depressive disorder, without psychotic features (Multi)      GI   (+) GERD (gastroesophageal reflux disease)   (+) Hiatal hernia   (+) Hiatal hernia with GERD      Liver   (+) Cholecystitis   (+) Cholelithiasis   (+) Gallstones   (+) Hepatitis      Endocrine   (+) Acquired hypothyroidism   (+) Class 1 obesity due to excess calories with serious comorbidity and body mass index (BMI) of 30.0 to 30.9 in adult      Musculoskeletal   (+) Osteoarthritis of left patellofemoral joint       Clinical information reviewed:   Tobacco  Allergies  Meds   Med Hx  Surg Hx   Fam Hx  Soc Hx        NPO Detail:  NPO/Void Status  Date of Last Liquid: 04/17/24  Time of Last Liquid: 0600  Date of Last Solid: 04/17/24  Time of Last Solid: 0000         Physical Exam    Airway  Mallampati: II  TM distance: >3 FB  Neck ROM: full     Cardiovascular - normal exam     Dental - normal exam     Pulmonary - normal exam     Abdominal      Other findings: Hx of vocal cord injury from chronic cough and previous hx of frequent vomiting          Anesthesia Plan    History of general anesthesia?: yes  History of complications of general anesthesia?: no    ASA 2     general     The patient is not a current smoker.    intravenous induction   Postoperative administration of opioids is intended.  Trial extubation is planned.  Anesthetic plan and risks discussed with patient.  Use of blood products discussed with patient who consented to blood  products.    Plan discussed with CRNA.

## 2024-04-18 ENCOUNTER — APPOINTMENT (OUTPATIENT)
Dept: SURGERY | Facility: CLINIC | Age: 64
End: 2024-04-18
Payer: COMMERCIAL

## 2024-04-18 PROBLEM — K80.42 CHOLEDOCHOLITHIASIS WITH ACUTE CHOLECYSTITIS: Status: ACTIVE | Noted: 2024-04-18

## 2024-04-18 LAB
ALBUMIN SERPL BCP-MCNC: 3.8 G/DL (ref 3.4–5)
ALP SERPL-CCNC: 51 U/L (ref 33–136)
ALT SERPL W P-5'-P-CCNC: 77 U/L (ref 7–45)
ANION GAP SERPL CALC-SCNC: 12 MMOL/L (ref 10–20)
AST SERPL W P-5'-P-CCNC: 105 U/L (ref 9–39)
BILIRUB SERPL-MCNC: 0.7 MG/DL (ref 0–1.2)
BUN SERPL-MCNC: 14 MG/DL (ref 6–23)
CALCIUM SERPL-MCNC: 8.6 MG/DL (ref 8.6–10.3)
CHLORIDE SERPL-SCNC: 103 MMOL/L (ref 98–107)
CO2 SERPL-SCNC: 27 MMOL/L (ref 21–32)
CREAT SERPL-MCNC: 0.83 MG/DL (ref 0.5–1.05)
EGFRCR SERPLBLD CKD-EPI 2021: 79 ML/MIN/1.73M*2
ERYTHROCYTE [DISTWIDTH] IN BLOOD BY AUTOMATED COUNT: 13.6 % (ref 11.5–14.5)
GLUCOSE SERPL-MCNC: 143 MG/DL (ref 74–99)
HCT VFR BLD AUTO: 43 % (ref 36–46)
HGB BLD-MCNC: 14 G/DL (ref 12–16)
LIPASE SERPL-CCNC: 294 U/L (ref 9–82)
MCH RBC QN AUTO: 30.6 PG (ref 26–34)
MCHC RBC AUTO-ENTMCNC: 32.6 G/DL (ref 32–36)
MCV RBC AUTO: 94 FL (ref 80–100)
NRBC BLD-RTO: 0 /100 WBCS (ref 0–0)
PLATELET # BLD AUTO: 258 X10*3/UL (ref 150–450)
POTASSIUM SERPL-SCNC: 4.3 MMOL/L (ref 3.5–5.3)
PROT SERPL-MCNC: 5.8 G/DL (ref 6.4–8.2)
RBC # BLD AUTO: 4.58 X10*6/UL (ref 4–5.2)
SODIUM SERPL-SCNC: 138 MMOL/L (ref 136–145)
WBC # BLD AUTO: 17.6 X10*3/UL (ref 4.4–11.3)

## 2024-04-18 PROCEDURE — 1100000001 HC PRIVATE ROOM DAILY

## 2024-04-18 PROCEDURE — 36415 COLL VENOUS BLD VENIPUNCTURE: CPT | Performed by: STUDENT IN AN ORGANIZED HEALTH CARE EDUCATION/TRAINING PROGRAM

## 2024-04-18 PROCEDURE — 2500000001 HC RX 250 WO HCPCS SELF ADMINISTERED DRUGS (ALT 637 FOR MEDICARE OP): Performed by: STUDENT IN AN ORGANIZED HEALTH CARE EDUCATION/TRAINING PROGRAM

## 2024-04-18 PROCEDURE — 80053 COMPREHEN METABOLIC PANEL: CPT | Performed by: STUDENT IN AN ORGANIZED HEALTH CARE EDUCATION/TRAINING PROGRAM

## 2024-04-18 PROCEDURE — 85027 COMPLETE CBC AUTOMATED: CPT | Performed by: STUDENT IN AN ORGANIZED HEALTH CARE EDUCATION/TRAINING PROGRAM

## 2024-04-18 PROCEDURE — 2500000004 HC RX 250 GENERAL PHARMACY W/ HCPCS (ALT 636 FOR OP/ED): Mod: JZ | Performed by: STUDENT IN AN ORGANIZED HEALTH CARE EDUCATION/TRAINING PROGRAM

## 2024-04-18 PROCEDURE — 2500000006 HC RX 250 W HCPCS SELF ADMINISTERED DRUGS (ALT 637 FOR ALL PAYERS): Performed by: STUDENT IN AN ORGANIZED HEALTH CARE EDUCATION/TRAINING PROGRAM

## 2024-04-18 PROCEDURE — 83690 ASSAY OF LIPASE: CPT | Performed by: STUDENT IN AN ORGANIZED HEALTH CARE EDUCATION/TRAINING PROGRAM

## 2024-04-18 PROCEDURE — 2500000004 HC RX 250 GENERAL PHARMACY W/ HCPCS (ALT 636 FOR OP/ED): Performed by: STUDENT IN AN ORGANIZED HEALTH CARE EDUCATION/TRAINING PROGRAM

## 2024-04-18 RX ORDER — SERTRALINE HYDROCHLORIDE 100 MG/1
100 TABLET, FILM COATED ORAL DAILY
Status: DISCONTINUED | OUTPATIENT
Start: 2024-04-18 | End: 2024-05-02 | Stop reason: HOSPADM

## 2024-04-18 RX ORDER — KETOROLAC TROMETHAMINE 30 MG/ML
15 INJECTION, SOLUTION INTRAMUSCULAR; INTRAVENOUS EVERY 6 HOURS
Status: DISPENSED | OUTPATIENT
Start: 2024-04-18 | End: 2024-04-19

## 2024-04-18 RX ORDER — CLONIDINE HYDROCHLORIDE 0.1 MG/1
0.2 TABLET ORAL NIGHTLY
Status: DISCONTINUED | OUTPATIENT
Start: 2024-04-18 | End: 2024-04-20

## 2024-04-18 RX ORDER — CIPROFLOXACIN 2 MG/ML
400 INJECTION, SOLUTION INTRAVENOUS EVERY 12 HOURS
Status: DISCONTINUED | OUTPATIENT
Start: 2024-04-18 | End: 2024-04-23

## 2024-04-18 RX ORDER — ACETAMINOPHEN 325 MG/1
975 TABLET ORAL EVERY 6 HOURS
Status: DISCONTINUED | OUTPATIENT
Start: 2024-04-18 | End: 2024-05-02 | Stop reason: HOSPADM

## 2024-04-18 RX ORDER — OXYCODONE HCL 5 MG/5 ML
5 SOLUTION, ORAL ORAL EVERY 4 HOURS PRN
Status: DISCONTINUED | OUTPATIENT
Start: 2024-04-18 | End: 2024-05-02 | Stop reason: HOSPADM

## 2024-04-18 RX ORDER — TRAZODONE HYDROCHLORIDE 50 MG/1
100 TABLET ORAL NIGHTLY
Status: DISCONTINUED | OUTPATIENT
Start: 2024-04-18 | End: 2024-05-02 | Stop reason: HOSPADM

## 2024-04-18 RX ORDER — BUPROPION HYDROCHLORIDE 150 MG/1
150 TABLET ORAL DAILY
Status: DISCONTINUED | OUTPATIENT
Start: 2024-04-18 | End: 2024-05-02 | Stop reason: HOSPADM

## 2024-04-18 RX ORDER — HYDRALAZINE HYDROCHLORIDE 20 MG/ML
5 INJECTION INTRAMUSCULAR; INTRAVENOUS EVERY 6 HOURS PRN
Status: DISCONTINUED | OUTPATIENT
Start: 2024-04-18 | End: 2024-05-02 | Stop reason: HOSPADM

## 2024-04-18 RX ORDER — OXYCODONE HCL 5 MG/5 ML
10 SOLUTION, ORAL ORAL EVERY 4 HOURS PRN
Status: DISCONTINUED | OUTPATIENT
Start: 2024-04-18 | End: 2024-05-02 | Stop reason: HOSPADM

## 2024-04-18 RX ORDER — CEFAZOLIN SODIUM 2 G/100ML
2 INJECTION, SOLUTION INTRAVENOUS EVERY 8 HOURS
Status: COMPLETED | OUTPATIENT
Start: 2024-04-18 | End: 2024-04-18

## 2024-04-18 RX ORDER — METRONIDAZOLE 500 MG/100ML
500 INJECTION, SOLUTION INTRAVENOUS EVERY 8 HOURS
Status: DISCONTINUED | OUTPATIENT
Start: 2024-04-18 | End: 2024-04-23

## 2024-04-18 RX ADMIN — OXYCODONE HYDROCHLORIDE 10 MG: 5 SOLUTION ORAL at 06:21

## 2024-04-18 RX ADMIN — BUPROPION HYDROCHLORIDE 150 MG: 150 TABLET, FILM COATED, EXTENDED RELEASE ORAL at 14:07

## 2024-04-18 RX ADMIN — HEPARIN SODIUM 5000 UNITS: 5000 INJECTION INTRAVENOUS; SUBCUTANEOUS at 00:16

## 2024-04-18 RX ADMIN — SODIUM CHLORIDE, SODIUM LACTATE, POTASSIUM CHLORIDE, AND CALCIUM CHLORIDE 100 ML/HR: 600; 310; 30; 20 INJECTION, SOLUTION INTRAVENOUS at 22:35

## 2024-04-18 RX ADMIN — SERTRALINE HYDROCHLORIDE 100 MG: 100 TABLET ORAL at 00:38

## 2024-04-18 RX ADMIN — HEPARIN SODIUM 5000 UNITS: 5000 INJECTION INTRAVENOUS; SUBCUTANEOUS at 17:18

## 2024-04-18 RX ADMIN — CEFAZOLIN SODIUM 2 G: 2 INJECTION, SOLUTION INTRAVENOUS at 00:38

## 2024-04-18 RX ADMIN — OXYCODONE HYDROCHLORIDE 10 MG: 5 SOLUTION ORAL at 21:13

## 2024-04-18 RX ADMIN — CIPROFLOXACIN 400 MG: 400 INJECTION, SOLUTION INTRAVENOUS at 22:35

## 2024-04-18 RX ADMIN — ACETAMINOPHEN 975 MG: 325 TABLET ORAL at 19:50

## 2024-04-18 RX ADMIN — KETOROLAC TROMETHAMINE 15 MG: 30 INJECTION, SOLUTION INTRAMUSCULAR at 20:49

## 2024-04-18 RX ADMIN — HEPARIN SODIUM 5000 UNITS: 5000 INJECTION INTRAVENOUS; SUBCUTANEOUS at 08:43

## 2024-04-18 RX ADMIN — OXYCODONE HYDROCHLORIDE 10 MG: 5 SOLUTION ORAL at 16:43

## 2024-04-18 RX ADMIN — METRONIDAZOLE 500 MG: 500 INJECTION, SOLUTION INTRAVENOUS at 20:48

## 2024-04-18 RX ADMIN — TRAZODONE HYDROCHLORIDE 100 MG: 50 TABLET ORAL at 20:49

## 2024-04-18 RX ADMIN — CEFAZOLIN SODIUM 2 G: 2 INJECTION, SOLUTION INTRAVENOUS at 08:43

## 2024-04-18 RX ADMIN — OXYCODONE HYDROCHLORIDE 10 MG: 5 SOLUTION ORAL at 00:16

## 2024-04-18 RX ADMIN — CLONIDINE HYDROCHLORIDE 0.2 MG: 0.1 TABLET ORAL at 20:49

## 2024-04-18 RX ADMIN — ACETAMINOPHEN 650 MG: 325 TABLET ORAL at 11:22

## 2024-04-18 RX ADMIN — TRAZODONE HYDROCHLORIDE 100 MG: 50 TABLET ORAL at 00:38

## 2024-04-18 RX ADMIN — KETOROLAC TROMETHAMINE 15 MG: 30 INJECTION, SOLUTION INTRAMUSCULAR at 14:08

## 2024-04-18 RX ADMIN — OXYCODONE HYDROCHLORIDE 10 MG: 5 SOLUTION ORAL at 12:16

## 2024-04-18 ASSESSMENT — PAIN - FUNCTIONAL ASSESSMENT
PAIN_FUNCTIONAL_ASSESSMENT: 0-10

## 2024-04-18 ASSESSMENT — PAIN SCALES - GENERAL
PAINLEVEL_OUTOF10: 7
PAINLEVEL_OUTOF10: 5 - MODERATE PAIN
PAINLEVEL_OUTOF10: 7
PAINLEVEL_OUTOF10: 8
PAINLEVEL_OUTOF10: 4
PAINLEVEL_OUTOF10: 10 - WORST POSSIBLE PAIN

## 2024-04-18 ASSESSMENT — COGNITIVE AND FUNCTIONAL STATUS - GENERAL
DRESSING REGULAR LOWER BODY CLOTHING: A LITTLE
TURNING FROM BACK TO SIDE WHILE IN FLAT BAD: A LITTLE
DAILY ACTIVITIY SCORE: 20
MOVING TO AND FROM BED TO CHAIR: A LITTLE
TOILETING: A LITTLE
CLIMB 3 TO 5 STEPS WITH RAILING: A LITTLE
TOILETING: A LITTLE
MOBILITY SCORE: 18
MOVING TO AND FROM BED TO CHAIR: A LITTLE
WALKING IN HOSPITAL ROOM: A LITTLE
HELP NEEDED FOR BATHING: A LITTLE
MOVING FROM LYING ON BACK TO SITTING ON SIDE OF FLAT BED WITH BEDRAILS: A LITTLE
MOBILITY SCORE: 18
TURNING FROM BACK TO SIDE WHILE IN FLAT BAD: A LITTLE
DRESSING REGULAR LOWER BODY CLOTHING: A LITTLE
STANDING UP FROM CHAIR USING ARMS: A LITTLE
MOVING FROM LYING ON BACK TO SITTING ON SIDE OF FLAT BED WITH BEDRAILS: A LITTLE
WALKING IN HOSPITAL ROOM: A LITTLE
DRESSING REGULAR UPPER BODY CLOTHING: A LITTLE
DRESSING REGULAR UPPER BODY CLOTHING: A LITTLE
HELP NEEDED FOR BATHING: A LITTLE
DAILY ACTIVITIY SCORE: 20
STANDING UP FROM CHAIR USING ARMS: A LITTLE
CLIMB 3 TO 5 STEPS WITH RAILING: A LITTLE

## 2024-04-18 NOTE — CARE PLAN
Problem: Pain  Goal: My pain/discomfort is manageable  4/18/2024 1148 by Ethel Sweeney RN  Outcome: Progressing  4/18/2024 1004 by Ethel Sweeney RN  Outcome: Progressing  4/18/2024 1004 by Ethel Sweeney RN  Outcome: Progressing     Problem: Safety  Goal: Patient will be injury free during hospitalization  4/18/2024 1148 by Ethel Sweeney RN  Outcome: Progressing  4/18/2024 1004 by Ethel Sweeney RN  Outcome: Progressing  4/18/2024 1004 by Ethel Sweeney RN  Outcome: Progressing  Goal: I will remain free of falls  4/18/2024 1148 by Ethel Sweeney RN  Outcome: Progressing  4/18/2024 1004 by Ethel Sweeney RN  Outcome: Progressing  4/18/2024 1004 by Ethel Sweeney RN  Outcome: Progressing     Problem: Daily Care  Goal: Daily care needs are met  4/18/2024 1148 by Ethel Sweeney RN  Outcome: Progressing  4/18/2024 1004 by Ethel Sweeney RN  Outcome: Progressing  4/18/2024 1004 by Ethel Sweeney RN  Outcome: Progressing     Problem: Psychosocial Needs  Goal: Demonstrates ability to cope with hospitalization/illness  4/18/2024 1148 by Ethel Sweeney RN  Outcome: Progressing  4/18/2024 1004 by Ethel Sweeney RN  Outcome: Progressing  4/18/2024 1004 by Ethel Sweeney RN  Outcome: Progressing  Goal: Collaborate with me, my family, and caregiver to identify my specific goals  4/18/2024 1148 by Ethel Sweeney RN  Outcome: Progressing  4/18/2024 1004 by Ethel Sweeney RN  Outcome: Progressing  4/18/2024 1004 by Ethel Sweeney RN  Outcome: Progressing     Problem: Discharge Barriers  Goal: My discharge needs are met  4/18/2024 1148 by Ethel Sweeney RN  Outcome: Progressing  4/18/2024 1004 by Ethel Sweeney RN  Outcome: Progressing  4/18/2024 1004 by Ethel Sweeney RN  Outcome: Progressing

## 2024-04-18 NOTE — CARE PLAN
The patient's goals for the shift include      The clinical goals for the shift include          Problem: Pain  Goal: My pain/discomfort is manageable  Outcome: Progressing     Problem: Safety  Goal: Patient will be injury free during hospitalization  Outcome: Progressing     Problem: Safety  Goal: I will remain free of falls  Outcome: Progressing     Problem: Daily Care  Goal: Daily care needs are met  Outcome: Progressing     Problem: Psychosocial Needs  Goal: Collaborate with me, my family, and caregiver to identify my specific goals  Outcome: Progressing  Flowsheets (Taken 4/17/2024 2237)  Cultural Requests During Hospitalization: n/a  Spiritual Requests During Hospitalization: n/a

## 2024-04-18 NOTE — CARE PLAN
Problem: Pain  Goal: My pain/discomfort is manageable  4/18/2024 1004 by Ethel Sweeney RN  Outcome: Progressing  4/18/2024 1004 by Ethle Sweeney RN  Outcome: Progressing     Problem: Safety  Goal: Patient will be injury free during hospitalization  4/18/2024 1004 by Ethel Sweeney RN  Outcome: Progressing  4/18/2024 1004 by Ethel Sweeney RN  Outcome: Progressing  Goal: I will remain free of falls  4/18/2024 1004 by Ethel Sweeney RN  Outcome: Progressing  4/18/2024 1004 by Ethel Sweeney RN  Outcome: Progressing     Problem: Daily Care  Goal: Daily care needs are met  4/18/2024 1004 by Ethel Sweeney RN  Outcome: Progressing  4/18/2024 1004 by Ethel Sweeney RN  Outcome: Progressing     Problem: Psychosocial Needs  Goal: Demonstrates ability to cope with hospitalization/illness  4/18/2024 1004 by Ethel Sweeney RN  Outcome: Progressing  4/18/2024 1004 by Ethel Sweeney RN  Outcome: Progressing  Goal: Collaborate with me, my family, and caregiver to identify my specific goals  4/18/2024 1004 by Ethel Sweeney RN  Outcome: Progressing  4/18/2024 1004 by Ethel Sweeney RN  Outcome: Progressing     Problem: Discharge Barriers  Goal: My discharge needs are met  4/18/2024 1004 by Ethel Sweeney RN  Outcome: Progressing  4/18/2024 1004 by Ethel Sweeney RN  Outcome: Progressing

## 2024-04-18 NOTE — PROGRESS NOTES
Call made to bedside. Patient alert & oriented x3. This TCC introduced myself and my role. Assessment information gathered for discharge.  Patient lives at home with spouse. Notes she does not have any Hc services in place prior to admission. But will consider it if required at discharge. Care Transitions will continue to follow during course of hospital stay for any changes to discharge needs.  Kristie Marquez RN

## 2024-04-19 LAB
ALBUMIN SERPL BCP-MCNC: 3 G/DL (ref 3.4–5)
ALP SERPL-CCNC: 39 U/L (ref 33–136)
ALT SERPL W P-5'-P-CCNC: 36 U/L (ref 7–45)
ANION GAP SERPL CALC-SCNC: 8 MMOL/L (ref 10–20)
AST SERPL W P-5'-P-CCNC: 36 U/L (ref 9–39)
BASOPHILS # BLD AUTO: 0.02 X10*3/UL (ref 0–0.1)
BASOPHILS NFR BLD AUTO: 0.2 %
BILIRUB SERPL-MCNC: 0.9 MG/DL (ref 0–1.2)
BUN SERPL-MCNC: 9 MG/DL (ref 6–23)
CALCIUM SERPL-MCNC: 8.1 MG/DL (ref 8.6–10.3)
CHLORIDE SERPL-SCNC: 102 MMOL/L (ref 98–107)
CO2 SERPL-SCNC: 30 MMOL/L (ref 21–32)
CREAT SERPL-MCNC: 0.68 MG/DL (ref 0.5–1.05)
EGFRCR SERPLBLD CKD-EPI 2021: >90 ML/MIN/1.73M*2
EOSINOPHIL # BLD AUTO: 0.22 X10*3/UL (ref 0–0.7)
EOSINOPHIL NFR BLD AUTO: 1.9 %
ERYTHROCYTE [DISTWIDTH] IN BLOOD BY AUTOMATED COUNT: 13.9 % (ref 11.5–14.5)
GLUCOSE SERPL-MCNC: 93 MG/DL (ref 74–99)
HCT VFR BLD AUTO: 37.4 % (ref 36–46)
HGB BLD-MCNC: 12.3 G/DL (ref 12–16)
IMM GRANULOCYTES # BLD AUTO: 0.04 X10*3/UL (ref 0–0.7)
IMM GRANULOCYTES NFR BLD AUTO: 0.3 % (ref 0–0.9)
LYMPHOCYTES # BLD AUTO: 1.27 X10*3/UL (ref 1.2–4.8)
LYMPHOCYTES NFR BLD AUTO: 10.9 %
MCH RBC QN AUTO: 30.8 PG (ref 26–34)
MCHC RBC AUTO-ENTMCNC: 32.9 G/DL (ref 32–36)
MCV RBC AUTO: 94 FL (ref 80–100)
MONOCYTES # BLD AUTO: 0.5 X10*3/UL (ref 0.1–1)
MONOCYTES NFR BLD AUTO: 4.3 %
NEUTROPHILS # BLD AUTO: 9.63 X10*3/UL (ref 1.2–7.7)
NEUTROPHILS NFR BLD AUTO: 82.4 %
NRBC BLD-RTO: 0 /100 WBCS (ref 0–0)
PLATELET # BLD AUTO: 205 X10*3/UL (ref 150–450)
POTASSIUM SERPL-SCNC: 3.5 MMOL/L (ref 3.5–5.3)
PROT SERPL-MCNC: 4.9 G/DL (ref 6.4–8.2)
RBC # BLD AUTO: 3.99 X10*6/UL (ref 4–5.2)
SODIUM SERPL-SCNC: 136 MMOL/L (ref 136–145)
WBC # BLD AUTO: 11.7 X10*3/UL (ref 4.4–11.3)

## 2024-04-19 PROCEDURE — 2500000004 HC RX 250 GENERAL PHARMACY W/ HCPCS (ALT 636 FOR OP/ED): Performed by: STUDENT IN AN ORGANIZED HEALTH CARE EDUCATION/TRAINING PROGRAM

## 2024-04-19 PROCEDURE — 80053 COMPREHEN METABOLIC PANEL: CPT | Performed by: STUDENT IN AN ORGANIZED HEALTH CARE EDUCATION/TRAINING PROGRAM

## 2024-04-19 PROCEDURE — 85025 COMPLETE CBC W/AUTO DIFF WBC: CPT | Performed by: STUDENT IN AN ORGANIZED HEALTH CARE EDUCATION/TRAINING PROGRAM

## 2024-04-19 PROCEDURE — 2500000004 HC RX 250 GENERAL PHARMACY W/ HCPCS (ALT 636 FOR OP/ED): Mod: JZ | Performed by: STUDENT IN AN ORGANIZED HEALTH CARE EDUCATION/TRAINING PROGRAM

## 2024-04-19 PROCEDURE — 2500000006 HC RX 250 W HCPCS SELF ADMINISTERED DRUGS (ALT 637 FOR ALL PAYERS): Performed by: STUDENT IN AN ORGANIZED HEALTH CARE EDUCATION/TRAINING PROGRAM

## 2024-04-19 PROCEDURE — 2500000001 HC RX 250 WO HCPCS SELF ADMINISTERED DRUGS (ALT 637 FOR MEDICARE OP): Performed by: STUDENT IN AN ORGANIZED HEALTH CARE EDUCATION/TRAINING PROGRAM

## 2024-04-19 PROCEDURE — 1100000001 HC PRIVATE ROOM DAILY

## 2024-04-19 PROCEDURE — 36415 COLL VENOUS BLD VENIPUNCTURE: CPT | Performed by: STUDENT IN AN ORGANIZED HEALTH CARE EDUCATION/TRAINING PROGRAM

## 2024-04-19 RX ORDER — ADHESIVE BANDAGE
30 BANDAGE TOPICAL DAILY
Status: DISCONTINUED | OUTPATIENT
Start: 2024-04-19 | End: 2024-05-02 | Stop reason: HOSPADM

## 2024-04-19 RX ORDER — CLONIDINE HYDROCHLORIDE 0.1 MG/1
0.2 TABLET ORAL NIGHTLY
Status: CANCELLED | OUTPATIENT
Start: 2024-04-20

## 2024-04-19 RX ADMIN — BUPROPION HYDROCHLORIDE 150 MG: 150 TABLET, FILM COATED, EXTENDED RELEASE ORAL at 08:03

## 2024-04-19 RX ADMIN — METRONIDAZOLE 500 MG: 500 INJECTION, SOLUTION INTRAVENOUS at 04:39

## 2024-04-19 RX ADMIN — OXYCODONE HYDROCHLORIDE 10 MG: 5 SOLUTION ORAL at 14:43

## 2024-04-19 RX ADMIN — SODIUM CHLORIDE, POTASSIUM CHLORIDE, SODIUM LACTATE AND CALCIUM CHLORIDE 1000 ML: 600; 310; 30; 20 INJECTION, SOLUTION INTRAVENOUS at 22:37

## 2024-04-19 RX ADMIN — KETOROLAC TROMETHAMINE 15 MG: 30 INJECTION, SOLUTION INTRAMUSCULAR at 02:54

## 2024-04-19 RX ADMIN — HEPARIN SODIUM 5000 UNITS: 5000 INJECTION INTRAVENOUS; SUBCUTANEOUS at 08:03

## 2024-04-19 RX ADMIN — MAGNESIUM HYDROXIDE 30 ML: 400 SUSPENSION ORAL at 13:18

## 2024-04-19 RX ADMIN — OXYCODONE HYDROCHLORIDE 5 MG: 5 SOLUTION ORAL at 19:31

## 2024-04-19 RX ADMIN — OXYCODONE HYDROCHLORIDE 10 MG: 5 SOLUTION ORAL at 04:39

## 2024-04-19 RX ADMIN — METRONIDAZOLE 500 MG: 500 INJECTION, SOLUTION INTRAVENOUS at 13:18

## 2024-04-19 RX ADMIN — TRAZODONE HYDROCHLORIDE 100 MG: 50 TABLET ORAL at 21:43

## 2024-04-19 RX ADMIN — CLONIDINE HYDROCHLORIDE 0.2 MG: 0.1 TABLET ORAL at 20:38

## 2024-04-19 RX ADMIN — SERTRALINE HYDROCHLORIDE 100 MG: 100 TABLET ORAL at 08:03

## 2024-04-19 RX ADMIN — CIPROFLOXACIN 400 MG: 400 INJECTION, SOLUTION INTRAVENOUS at 20:38

## 2024-04-19 RX ADMIN — KETOROLAC TROMETHAMINE 15 MG: 30 INJECTION, SOLUTION INTRAMUSCULAR at 09:35

## 2024-04-19 RX ADMIN — SODIUM CHLORIDE, POTASSIUM CHLORIDE, SODIUM LACTATE AND CALCIUM CHLORIDE 1000 ML: 600; 310; 30; 20 INJECTION, SOLUTION INTRAVENOUS at 02:20

## 2024-04-19 RX ADMIN — METRONIDAZOLE 500 MG: 500 INJECTION, SOLUTION INTRAVENOUS at 19:31

## 2024-04-19 RX ADMIN — HEPARIN SODIUM 5000 UNITS: 5000 INJECTION INTRAVENOUS; SUBCUTANEOUS at 00:21

## 2024-04-19 RX ADMIN — KETOROLAC TROMETHAMINE 15 MG: 30 INJECTION, SOLUTION INTRAMUSCULAR at 15:38

## 2024-04-19 RX ADMIN — CIPROFLOXACIN 400 MG: 400 INJECTION, SOLUTION INTRAVENOUS at 08:04

## 2024-04-19 RX ADMIN — OXYCODONE HYDROCHLORIDE 10 MG: 5 SOLUTION ORAL at 09:12

## 2024-04-19 RX ADMIN — HEPARIN SODIUM 5000 UNITS: 5000 INJECTION INTRAVENOUS; SUBCUTANEOUS at 15:38

## 2024-04-19 RX ADMIN — SODIUM CHLORIDE, SODIUM LACTATE, POTASSIUM CHLORIDE, AND CALCIUM CHLORIDE 100 ML/HR: 600; 310; 30; 20 INJECTION, SOLUTION INTRAVENOUS at 04:44

## 2024-04-19 ASSESSMENT — COGNITIVE AND FUNCTIONAL STATUS - GENERAL
MOVING TO AND FROM BED TO CHAIR: A LITTLE
HELP NEEDED FOR BATHING: A LITTLE
DRESSING REGULAR UPPER BODY CLOTHING: A LITTLE
MOVING TO AND FROM BED TO CHAIR: A LITTLE
PERSONAL GROOMING: A LITTLE
MOVING FROM LYING ON BACK TO SITTING ON SIDE OF FLAT BED WITH BEDRAILS: A LITTLE
TURNING FROM BACK TO SIDE WHILE IN FLAT BAD: A LITTLE
STANDING UP FROM CHAIR USING ARMS: A LITTLE
DRESSING REGULAR LOWER BODY CLOTHING: A LITTLE
TOILETING: A LITTLE
MOBILITY SCORE: 18
STANDING UP FROM CHAIR USING ARMS: A LITTLE
DAILY ACTIVITIY SCORE: 19
WALKING IN HOSPITAL ROOM: A LITTLE
TOILETING: A LITTLE
DRESSING REGULAR UPPER BODY CLOTHING: A LITTLE
CLIMB 3 TO 5 STEPS WITH RAILING: A LITTLE
TURNING FROM BACK TO SIDE WHILE IN FLAT BAD: A LITTLE
CLIMB 3 TO 5 STEPS WITH RAILING: A LITTLE
MOBILITY SCORE: 18
HELP NEEDED FOR BATHING: A LITTLE
WALKING IN HOSPITAL ROOM: A LITTLE
MOVING FROM LYING ON BACK TO SITTING ON SIDE OF FLAT BED WITH BEDRAILS: A LITTLE
PERSONAL GROOMING: A LITTLE
DRESSING REGULAR LOWER BODY CLOTHING: A LITTLE
EATING MEALS: A LITTLE
DAILY ACTIVITIY SCORE: 18

## 2024-04-19 ASSESSMENT — PAIN SCALES - GENERAL
PAINLEVEL_OUTOF10: 7
PAINLEVEL_OUTOF10: 3
PAINLEVEL_OUTOF10: 5 - MODERATE PAIN
PAINLEVEL_OUTOF10: 8
PAINLEVEL_OUTOF10: 4
PAINLEVEL_OUTOF10: 8

## 2024-04-19 ASSESSMENT — PAIN - FUNCTIONAL ASSESSMENT
PAIN_FUNCTIONAL_ASSESSMENT: 0-10

## 2024-04-19 NOTE — CARE PLAN
The patient's goals for the shift include  comfort and rest    The clinical goals for the shift include Pt will have decrease pain during shift

## 2024-04-19 NOTE — PROGRESS NOTES
[facility]  FOLLOW UP NOTE      Name: Mandy Ramirez  MRN: [id number]      Index Surgery  Date of Surgery: 4/17/2024   Surgeon: Dr. Bedoya    Surgical Procedure: Other: Lap Cholecystectomy    HPI:   Presenting for 1 week post op visit.    Concerns related to:  Nausea/Vomiting, Reflux: ***  Abdominal Pain: ***  Diarrhea/Constipation ***        REVIEW OF SYSTEMS:  CONSTITUTIONAL: Patient denies fevers, chills, sweats and weight changes.  CARDIOVASCULAR: Patient denies chest pains, palpitations, orthopnea and paroxysmal nocturnal dyspnea.  RESPIRATORY: No dyspnea on exertion, no wheezing or cough.  GI: No nausea, vomiting, diarrhea, constipation, abdominal pain, hematochezia or melena.  MUSCULOSKELETAL: No myalgias or arthralgias.  NEUROLOGIC: No chronic headaches, no seizures. Patient denies numbness, tingling or weakness.  PSYCHIATRIC: Patient denies problems with mood disturbance. No problems with anxiety.  ENDOCRINE: No excessive urination or excessive thirst.  DERMATOLOGIC: Patient denies any rashes or skin changes.    PHYSICAL EXAM:  There were no vitals taken for this visit.  Alert, well appearing, no acute distress, nourished, hydrated.  Anicteric sclera, no ptosis  Facial symmetry  Neck supple  Unlabored respirations.  Easily conversant without increased respiratory effort  Oriented to person, place, time.  Judgement intact.  Appropriate mood, affect.       ASSESSMENT & PLAN:  63 y.o. female presenting for follow up visit s/p cholecystectomy 4/17/2024.    No acute post surgery complications  No acute issues or concerns presented today.  Tolerating diet   Bowel regularity ***    Plan:  Follow up as discussed

## 2024-04-19 NOTE — PROGRESS NOTES
"Mandy Ramirez is a 63 y.o. female on day 2 of admission presenting with Cholecystitis.    Subjective   Patient is recovering from surgery.   Patient is NPO, tolerating clears without nausea/vomiting. Has ambulated, voiding and pain is an issue for the patient mostly when walking or bending.              Objective     Physical Exam  Vitals reviewed.   Constitutional:       Appearance: Normal appearance. She is obese.   HENT:      Head: Normocephalic and atraumatic.      Nose: Nose normal.      Mouth/Throat:      Mouth: Mucous membranes are moist.   Eyes:      Extraocular Movements: Extraocular movements intact.      Pupils: Pupils are equal, round, and reactive to light.   Cardiovascular:      Rate and Rhythm: Normal rate and regular rhythm.   Pulmonary:      Effort: Pulmonary effort is normal.   Abdominal:      Palpations: Abdomen is soft.      Tenderness: There is abdominal tenderness (mild incisional).      Comments: Incisions c/d/i   Musculoskeletal:         General: Normal range of motion.      Cervical back: Normal range of motion and neck supple.   Skin:     General: Skin is warm and dry.      Capillary Refill: Capillary refill takes less than 2 seconds.   Neurological:      General: No focal deficit present.      Mental Status: She is alert.   Psychiatric:         Mood and Affect: Mood normal.         Behavior: Behavior normal.         Thought Content: Thought content normal.         Judgment: Judgment normal.       Drain: bile ~1L/24h    Last Recorded Vitals  Blood pressure 130/84, pulse 95, temperature 35.9 °C (96.6 °F), temperature source Temporal, resp. rate 18, height 1.727 m (5' 8\"), weight 97.1 kg (214 lb), SpO2 92%.  Intake/Output last 3 Shifts:  I/O last 3 completed shifts:  In: 3523.7 (36.3 mL/kg) [P.O.:597; I.V.:1526.7 (15.7 mL/kg); IV Piggyback:1400]  Out: 4330 (44.6 mL/kg) [Urine:3400 (1 mL/kg/hr); Drains:930]  Weight: 97.1 kg     Relevant Results                             Assessment/Plan "   Principal Problem:    Cholecystitis  Active Problems:    Choledocholithiasis with acute cholecystitis    Gallstones    63F w/ h/o RYGB s/p lap subtotal cholecystectomy with cholangiogram and ERCP through gastric remnant for CHD stone admitted for further management.     -Keep drain in place   -Start ciproflagyl for bile leak  -Pain control - schedule tylenol, oxy q4h, toradol   -SCDs, SQH, ambulation    Jenny Junior MD

## 2024-04-20 LAB
ALBUMIN SERPL BCP-MCNC: 2.9 G/DL (ref 3.4–5)
ALP SERPL-CCNC: 40 U/L (ref 33–136)
ALT SERPL W P-5'-P-CCNC: 24 U/L (ref 7–45)
ANION GAP SERPL CALC-SCNC: 12 MMOL/L (ref 10–20)
AST SERPL W P-5'-P-CCNC: 21 U/L (ref 9–39)
BASOPHILS # BLD AUTO: 0.04 X10*3/UL (ref 0–0.1)
BASOPHILS NFR BLD AUTO: 0.3 %
BILIRUB SERPL-MCNC: 1 MG/DL (ref 0–1.2)
BUN SERPL-MCNC: 7 MG/DL (ref 6–23)
CALCIUM SERPL-MCNC: 8.2 MG/DL (ref 8.6–10.3)
CHLORIDE SERPL-SCNC: 102 MMOL/L (ref 98–107)
CO2 SERPL-SCNC: 25 MMOL/L (ref 21–32)
CREAT SERPL-MCNC: 0.6 MG/DL (ref 0.5–1.05)
EGFRCR SERPLBLD CKD-EPI 2021: >90 ML/MIN/1.73M*2
EOSINOPHIL # BLD AUTO: 0.57 X10*3/UL (ref 0–0.7)
EOSINOPHIL NFR BLD AUTO: 4.4 %
ERYTHROCYTE [DISTWIDTH] IN BLOOD BY AUTOMATED COUNT: 13.4 % (ref 11.5–14.5)
GLUCOSE SERPL-MCNC: 94 MG/DL (ref 74–99)
HCT VFR BLD AUTO: 36.2 % (ref 36–46)
HGB BLD-MCNC: 12.3 G/DL (ref 12–16)
IMM GRANULOCYTES # BLD AUTO: 0.15 X10*3/UL (ref 0–0.7)
IMM GRANULOCYTES NFR BLD AUTO: 1.1 % (ref 0–0.9)
LYMPHOCYTES # BLD AUTO: 1.14 X10*3/UL (ref 1.2–4.8)
LYMPHOCYTES NFR BLD AUTO: 8.7 %
MCH RBC QN AUTO: 31.1 PG (ref 26–34)
MCHC RBC AUTO-ENTMCNC: 34 G/DL (ref 32–36)
MCV RBC AUTO: 91 FL (ref 80–100)
MONOCYTES # BLD AUTO: 0.67 X10*3/UL (ref 0.1–1)
MONOCYTES NFR BLD AUTO: 5.1 %
NEUTROPHILS # BLD AUTO: 10.48 X10*3/UL (ref 1.2–7.7)
NEUTROPHILS NFR BLD AUTO: 80.4 %
NRBC BLD-RTO: 0 /100 WBCS (ref 0–0)
PLATELET # BLD AUTO: 216 X10*3/UL (ref 150–450)
POTASSIUM SERPL-SCNC: 3.4 MMOL/L (ref 3.5–5.3)
PROT SERPL-MCNC: 5.1 G/DL (ref 6.4–8.2)
RBC # BLD AUTO: 3.96 X10*6/UL (ref 4–5.2)
SODIUM SERPL-SCNC: 136 MMOL/L (ref 136–145)
WBC # BLD AUTO: 13.1 X10*3/UL (ref 4.4–11.3)

## 2024-04-20 PROCEDURE — 36415 COLL VENOUS BLD VENIPUNCTURE: CPT | Performed by: STUDENT IN AN ORGANIZED HEALTH CARE EDUCATION/TRAINING PROGRAM

## 2024-04-20 PROCEDURE — 80053 COMPREHEN METABOLIC PANEL: CPT | Performed by: STUDENT IN AN ORGANIZED HEALTH CARE EDUCATION/TRAINING PROGRAM

## 2024-04-20 PROCEDURE — 1100000001 HC PRIVATE ROOM DAILY

## 2024-04-20 PROCEDURE — 85025 COMPLETE CBC W/AUTO DIFF WBC: CPT | Performed by: STUDENT IN AN ORGANIZED HEALTH CARE EDUCATION/TRAINING PROGRAM

## 2024-04-20 PROCEDURE — 2500000001 HC RX 250 WO HCPCS SELF ADMINISTERED DRUGS (ALT 637 FOR MEDICARE OP): Performed by: STUDENT IN AN ORGANIZED HEALTH CARE EDUCATION/TRAINING PROGRAM

## 2024-04-20 PROCEDURE — 2500000004 HC RX 250 GENERAL PHARMACY W/ HCPCS (ALT 636 FOR OP/ED): Performed by: STUDENT IN AN ORGANIZED HEALTH CARE EDUCATION/TRAINING PROGRAM

## 2024-04-20 PROCEDURE — 2500000006 HC RX 250 W HCPCS SELF ADMINISTERED DRUGS (ALT 637 FOR ALL PAYERS): Performed by: STUDENT IN AN ORGANIZED HEALTH CARE EDUCATION/TRAINING PROGRAM

## 2024-04-20 PROCEDURE — 2500000004 HC RX 250 GENERAL PHARMACY W/ HCPCS (ALT 636 FOR OP/ED): Mod: JZ | Performed by: STUDENT IN AN ORGANIZED HEALTH CARE EDUCATION/TRAINING PROGRAM

## 2024-04-20 RX ORDER — POLYETHYLENE GLYCOL 3350 17 G/17G
17 POWDER, FOR SOLUTION ORAL DAILY
Status: DISCONTINUED | OUTPATIENT
Start: 2024-04-20 | End: 2024-05-02 | Stop reason: HOSPADM

## 2024-04-20 RX ADMIN — OXYCODONE HYDROCHLORIDE 5 MG: 5 SOLUTION ORAL at 18:42

## 2024-04-20 RX ADMIN — POLYETHYLENE GLYCOL 3350 17 G: 17 POWDER, FOR SOLUTION ORAL at 21:20

## 2024-04-20 RX ADMIN — METRONIDAZOLE 500 MG: 500 INJECTION, SOLUTION INTRAVENOUS at 21:19

## 2024-04-20 RX ADMIN — HEPARIN SODIUM 5000 UNITS: 5000 INJECTION INTRAVENOUS; SUBCUTANEOUS at 08:07

## 2024-04-20 RX ADMIN — HEPARIN SODIUM 5000 UNITS: 5000 INJECTION INTRAVENOUS; SUBCUTANEOUS at 16:08

## 2024-04-20 RX ADMIN — OXYCODONE HYDROCHLORIDE 5 MG: 5 SOLUTION ORAL at 10:34

## 2024-04-20 RX ADMIN — OXYCODONE HYDROCHLORIDE 5 MG: 5 SOLUTION ORAL at 14:46

## 2024-04-20 RX ADMIN — ACETAMINOPHEN 975 MG: 325 TABLET ORAL at 06:34

## 2024-04-20 RX ADMIN — SODIUM CHLORIDE, SODIUM LACTATE, POTASSIUM CHLORIDE, AND CALCIUM CHLORIDE 100 ML/HR: 600; 310; 30; 20 INJECTION, SOLUTION INTRAVENOUS at 00:44

## 2024-04-20 RX ADMIN — ACETAMINOPHEN 975 MG: 325 TABLET ORAL at 21:27

## 2024-04-20 RX ADMIN — BUPROPION HYDROCHLORIDE 150 MG: 150 TABLET, FILM COATED, EXTENDED RELEASE ORAL at 08:06

## 2024-04-20 RX ADMIN — ACETAMINOPHEN 975 MG: 325 TABLET ORAL at 13:09

## 2024-04-20 RX ADMIN — OXYCODONE HYDROCHLORIDE 5 MG: 5 SOLUTION ORAL at 22:52

## 2024-04-20 RX ADMIN — HEPARIN SODIUM 5000 UNITS: 5000 INJECTION INTRAVENOUS; SUBCUTANEOUS at 00:44

## 2024-04-20 RX ADMIN — CIPROFLOXACIN 400 MG: 400 INJECTION, SOLUTION INTRAVENOUS at 21:19

## 2024-04-20 RX ADMIN — OXYCODONE HYDROCHLORIDE 10 MG: 5 SOLUTION ORAL at 00:45

## 2024-04-20 RX ADMIN — OXYCODONE HYDROCHLORIDE 5 MG: 5 SOLUTION ORAL at 06:08

## 2024-04-20 RX ADMIN — MAGNESIUM HYDROXIDE 30 ML: 400 SUSPENSION ORAL at 08:06

## 2024-04-20 RX ADMIN — SERTRALINE HYDROCHLORIDE 100 MG: 100 TABLET ORAL at 08:06

## 2024-04-20 RX ADMIN — METRONIDAZOLE 500 MG: 500 INJECTION, SOLUTION INTRAVENOUS at 13:10

## 2024-04-20 RX ADMIN — CIPROFLOXACIN 400 MG: 400 INJECTION, SOLUTION INTRAVENOUS at 08:07

## 2024-04-20 RX ADMIN — METRONIDAZOLE 500 MG: 500 INJECTION, SOLUTION INTRAVENOUS at 04:00

## 2024-04-20 RX ADMIN — TRAZODONE HYDROCHLORIDE 100 MG: 50 TABLET ORAL at 21:20

## 2024-04-20 RX ADMIN — SODIUM CHLORIDE, POTASSIUM CHLORIDE, SODIUM LACTATE AND CALCIUM CHLORIDE 500 ML: 600; 310; 30; 20 INJECTION, SOLUTION INTRAVENOUS at 16:02

## 2024-04-20 ASSESSMENT — PAIN SCALES - GENERAL
PAINLEVEL_OUTOF10: 4
PAINLEVEL_OUTOF10: 5 - MODERATE PAIN
PAINLEVEL_OUTOF10: 3
PAINLEVEL_OUTOF10: 2
PAINLEVEL_OUTOF10: 4
PAINLEVEL_OUTOF10: 7
PAINLEVEL_OUTOF10: 4
PAINLEVEL_OUTOF10: 4
PAINLEVEL_OUTOF10: 5 - MODERATE PAIN
PAINLEVEL_OUTOF10: 3
PAINLEVEL_OUTOF10: 4
PAINLEVEL_OUTOF10: 6

## 2024-04-20 ASSESSMENT — PAIN - FUNCTIONAL ASSESSMENT
PAIN_FUNCTIONAL_ASSESSMENT: 0-10

## 2024-04-20 NOTE — CARE PLAN
The patient's goals for the shift include      The clinical goals for the shift include Pt will have decrease pain during shift      Problem: Pain  Goal: My pain/discomfort is manageable  Outcome: Progressing     Problem: Safety  Goal: Patient will be injury free during hospitalization  Outcome: Progressing  Goal: I will remain free of falls  Outcome: Progressing     Problem: Daily Care  Goal: Daily care needs are met  Outcome: Progressing     Problem: Psychosocial Needs  Goal: Demonstrates ability to cope with hospitalization/illness  Outcome: Progressing  Goal: Collaborate with me, my family, and caregiver to identify my specific goals  Outcome: Progressing     Problem: Discharge Barriers  Goal: My discharge needs are met  Outcome: Progressing

## 2024-04-20 NOTE — CARE PLAN
The patient's goals for the shift include  comfort and safety    The clinical goals for the shift include Pt will have decrease pain during shift

## 2024-04-20 NOTE — PROGRESS NOTES
"Mandy Ramirez is a 63 y.o. female on day 3 of admission presenting with Cholecystitis.    Subjective   Patient is overall doing well. Still dumping from the drain dark bile. No fever/chills. Was hypotensive overnight required 1L bolus LR. She was asymptomatic. Patient is on a regular diet and has no issues. Pain is better controlled.       Objective     Physical Exam  Vitals reviewed.   Constitutional:       Appearance: Normal appearance. She is obese.   HENT:      Head: Normocephalic and atraumatic.      Nose: Nose normal.      Mouth/Throat:      Mouth: Mucous membranes are moist.   Eyes:      Extraocular Movements: Extraocular movements intact.      Pupils: Pupils are equal, round, and reactive to light.   Cardiovascular:      Rate and Rhythm: Normal rate and regular rhythm.   Pulmonary:      Effort: Pulmonary effort is normal.   Abdominal:      Palpations: Abdomen is soft.      Tenderness: There is abdominal tenderness (mild incisional).      Comments: Incisions c/d/i   Musculoskeletal:         General: Normal range of motion.      Cervical back: Normal range of motion and neck supple.   Skin:     General: Skin is warm and dry.      Capillary Refill: Capillary refill takes less than 2 seconds.   Neurological:      General: No focal deficit present.      Mental Status: She is alert.   Psychiatric:         Mood and Affect: Mood normal.         Behavior: Behavior normal.         Thought Content: Thought content normal.         Judgment: Judgment normal.     Drain: bile - decreasing in output    Last Recorded Vitals  Blood pressure 100/71, pulse 95, temperature 35.5 °C (95.9 °F), temperature source Temporal, resp. rate 18, height 1.727 m (5' 8\"), weight 97.1 kg (214 lb), SpO2 93%.  Intake/Output last 3 Shifts:  I/O last 3 completed shifts:  In: 5258.7 (54.2 mL/kg) [P.O.:1077; I.V.:1781.7 (18.4 mL/kg); IV Piggyback:2400]  Out: 2150 (22.1 mL/kg) [Urine:1700 (0.5 mL/kg/hr); Drains:450]  Weight: 97.1 kg     Relevant " Results                             Assessment/Plan   Principal Problem:    Cholecystitis  Active Problems:    Choledocholithiasis with acute cholecystitis    Gallstones    63F w/ h/o RYGB s/p lap subtotal cholecystectomy with cholangiogram and ERCP through gastric remnant for CHD stone admitted for further management.      -Keep drain in place   -Ciproflagyl for bile leak  -Pain control - scheduled tylenol, oxy q4h, toradol   -SCDs, SQH, ambulation    Jenny Junior MD

## 2024-04-20 NOTE — PROGRESS NOTES
"Mandy Ramirez is a 63 y.o. female on day 3 of admission presenting with Cholecystitis.    Subjective   Overnight, patient was hypotensive to 80's requiring 1L bolus. It was noted it happened ~1.5-2 hrs after clonidine. Otherwise, she is feeling better and was hoping she would go home. No fever/chills.        Objective     Physical Exam  Vitals reviewed.   Constitutional:       Appearance: Normal appearance. She is obese.   HENT:      Head: Normocephalic and atraumatic.      Nose: Nose normal.      Mouth/Throat:      Mouth: Mucous membranes are moist.   Eyes:      Extraocular Movements: Extraocular movements intact.      Pupils: Pupils are equal, round, and reactive to light.   Cardiovascular:      Rate and Rhythm: Normal rate and regular rhythm.   Pulmonary:      Effort: Pulmonary effort is normal.   Abdominal:      Palpations: Abdomen is soft.      Tenderness: There is abdominal tenderness (mild incisional).      Comments: Incisions c/d/i   Musculoskeletal:         General: Normal range of motion.      Cervical back: Normal range of motion and neck supple.   Skin:     General: Skin is warm and dry.      Capillary Refill: Capillary refill takes less than 2 seconds.   Neurological:      General: No focal deficit present.      Mental Status: She is alert.   Psychiatric:         Mood and Affect: Mood normal.         Behavior: Behavior normal.         Thought Content: Thought content normal.         Judgment: Judgment normal.      Drain: bile - 210cc/24h. Light bile in the bulb.  Last Recorded Vitals  Blood pressure 100/71, pulse 95, temperature 35.5 °C (95.9 °F), temperature source Temporal, resp. rate 18, height 1.727 m (5' 8\"), weight 97.1 kg (214 lb), SpO2 93%.  Intake/Output last 3 Shifts:  I/O last 3 completed shifts:  In: 5258.7 (54.2 mL/kg) [P.O.:1077; I.V.:1781.7 (18.4 mL/kg); IV Piggyback:2400]  Out: 2150 (22.1 mL/kg) [Urine:1700 (0.5 mL/kg/hr); Drains:450]  Weight: 97.1 kg     Relevant Results            "     Results for orders placed or performed during the hospital encounter of 04/17/24 (from the past 24 hour(s))   CBC and Auto Differential   Result Value Ref Range    WBC 13.1 (H) 4.4 - 11.3 x10*3/uL    nRBC 0.0 0.0 - 0.0 /100 WBCs    RBC 3.96 (L) 4.00 - 5.20 x10*6/uL    Hemoglobin 12.3 12.0 - 16.0 g/dL    Hematocrit 36.2 36.0 - 46.0 %    MCV 91 80 - 100 fL    MCH 31.1 26.0 - 34.0 pg    MCHC 34.0 32.0 - 36.0 g/dL    RDW 13.4 11.5 - 14.5 %    Platelets 216 150 - 450 x10*3/uL    Neutrophils % 80.4 40.0 - 80.0 %    Immature Granulocytes %, Automated 1.1 (H) 0.0 - 0.9 %    Lymphocytes % 8.7 13.0 - 44.0 %    Monocytes % 5.1 2.0 - 10.0 %    Eosinophils % 4.4 0.0 - 6.0 %    Basophils % 0.3 0.0 - 2.0 %    Neutrophils Absolute 10.48 (H) 1.20 - 7.70 x10*3/uL    Immature Granulocytes Absolute, Automated 0.15 0.00 - 0.70 x10*3/uL    Lymphocytes Absolute 1.14 (L) 1.20 - 4.80 x10*3/uL    Monocytes Absolute 0.67 0.10 - 1.00 x10*3/uL    Eosinophils Absolute 0.57 0.00 - 0.70 x10*3/uL    Basophils Absolute 0.04 0.00 - 0.10 x10*3/uL   Comprehensive Metabolic Panel   Result Value Ref Range    Glucose 94 74 - 99 mg/dL    Sodium 136 136 - 145 mmol/L    Potassium 3.4 (L) 3.5 - 5.3 mmol/L    Chloride 102 98 - 107 mmol/L    Bicarbonate 25 21 - 32 mmol/L    Anion Gap 12 10 - 20 mmol/L    Urea Nitrogen 7 6 - 23 mg/dL    Creatinine 0.60 0.50 - 1.05 mg/dL    eGFR >90 >60 mL/min/1.73m*2    Calcium 8.2 (L) 8.6 - 10.3 mg/dL    Albumin 2.9 (L) 3.4 - 5.0 g/dL    Alkaline Phosphatase 40 33 - 136 U/L    Total Protein 5.1 (L) 6.4 - 8.2 g/dL    AST 21 9 - 39 U/L    Bilirubin, Total 1.0 0.0 - 1.2 mg/dL    ALT 24 7 - 45 U/L                  Assessment/Plan   Principal Problem:    Cholecystitis  Active Problems:    Choledocholithiasis with acute cholecystitis    Gallstones      63F w/ h/o RYGB s/p lap subtotal cholecystectomy with cholangiogram and ERCP through gastric remnant for CHD stone admitted for further management.   4/20: WBC went up to 13.7 from  ~11 without change in vitals. Drain with decreasing output.      -Keep drain in place   -Ciproflagyl for bile leak for at least a week  -Pain control - scheduled tylenol, oxy q4h, toradol   -Will keep patient today given leukocytosis - will repeat labs tomorrow  -SCDs, SQH, ambulation

## 2024-04-20 NOTE — CARE PLAN
The patient's goals for the shift include      The clinical goals for the shift include will be d/c today      Problem: Pain  Goal: My pain/discomfort is manageable  Outcome: Progressing     Problem: Safety  Goal: Patient will be injury free during hospitalization  Outcome: Progressing  Goal: I will remain free of falls  Outcome: Progressing     Problem: Daily Care  Goal: Daily care needs are met  Outcome: Progressing     Problem: Psychosocial Needs  Goal: Demonstrates ability to cope with hospitalization/illness  Outcome: Progressing  Goal: Collaborate with me, my family, and caregiver to identify my specific goals  Outcome: Progressing     Problem: Discharge Barriers  Goal: My discharge needs are met  Outcome: Progressing

## 2024-04-21 ENCOUNTER — APPOINTMENT (OUTPATIENT)
Dept: RADIOLOGY | Facility: HOSPITAL | Age: 64
DRG: 417 | End: 2024-04-21
Payer: COMMERCIAL

## 2024-04-21 LAB
ALBUMIN SERPL BCP-MCNC: 2.8 G/DL (ref 3.4–5)
ALP SERPL-CCNC: 43 U/L (ref 33–136)
ALT SERPL W P-5'-P-CCNC: 18 U/L (ref 7–45)
ANION GAP SERPL CALC-SCNC: 10 MMOL/L (ref 10–20)
APPEARANCE UR: CLEAR
AST SERPL W P-5'-P-CCNC: 18 U/L (ref 9–39)
BACTERIA #/AREA URNS AUTO: ABNORMAL /HPF
BASOPHILS # BLD AUTO: 0.04 X10*3/UL (ref 0–0.1)
BASOPHILS NFR BLD AUTO: 0.3 %
BILIRUB SERPL-MCNC: 0.8 MG/DL (ref 0–1.2)
BILIRUB UR STRIP.AUTO-MCNC: NEGATIVE MG/DL
BUN SERPL-MCNC: 5 MG/DL (ref 6–23)
CALCIUM SERPL-MCNC: 8.1 MG/DL (ref 8.6–10.3)
CHLORIDE SERPL-SCNC: 101 MMOL/L (ref 98–107)
CO2 SERPL-SCNC: 28 MMOL/L (ref 21–32)
COLOR UR: YELLOW
CREAT SERPL-MCNC: 0.65 MG/DL (ref 0.5–1.05)
EGFRCR SERPLBLD CKD-EPI 2021: >90 ML/MIN/1.73M*2
EOSINOPHIL # BLD AUTO: 0.76 X10*3/UL (ref 0–0.7)
EOSINOPHIL NFR BLD AUTO: 5.3 %
ERYTHROCYTE [DISTWIDTH] IN BLOOD BY AUTOMATED COUNT: 13.4 % (ref 11.5–14.5)
GLUCOSE SERPL-MCNC: 90 MG/DL (ref 74–99)
GLUCOSE UR STRIP.AUTO-MCNC: NEGATIVE MG/DL
HCT VFR BLD AUTO: 32.9 % (ref 36–46)
HGB BLD-MCNC: 10.9 G/DL (ref 12–16)
IMM GRANULOCYTES # BLD AUTO: 0.1 X10*3/UL (ref 0–0.7)
IMM GRANULOCYTES NFR BLD AUTO: 0.7 % (ref 0–0.9)
KETONES UR STRIP.AUTO-MCNC: NEGATIVE MG/DL
LEUKOCYTE ESTERASE UR QL STRIP.AUTO: ABNORMAL
LIPASE SERPL-CCNC: 21 U/L (ref 9–82)
LYMPHOCYTES # BLD AUTO: 1.52 X10*3/UL (ref 1.2–4.8)
LYMPHOCYTES NFR BLD AUTO: 10.5 %
MCH RBC QN AUTO: 30.4 PG (ref 26–34)
MCHC RBC AUTO-ENTMCNC: 33.1 G/DL (ref 32–36)
MCV RBC AUTO: 92 FL (ref 80–100)
MONOCYTES # BLD AUTO: 0.87 X10*3/UL (ref 0.1–1)
MONOCYTES NFR BLD AUTO: 6 %
NEUTROPHILS # BLD AUTO: 11.18 X10*3/UL (ref 1.2–7.7)
NEUTROPHILS NFR BLD AUTO: 77.2 %
NITRITE UR QL STRIP.AUTO: NEGATIVE
NRBC BLD-RTO: 0 /100 WBCS (ref 0–0)
PH UR STRIP.AUTO: 7 [PH]
PLATELET # BLD AUTO: 245 X10*3/UL (ref 150–450)
POTASSIUM SERPL-SCNC: 3.2 MMOL/L (ref 3.5–5.3)
PROT SERPL-MCNC: 4.9 G/DL (ref 6.4–8.2)
PROT UR STRIP.AUTO-MCNC: NEGATIVE MG/DL
RBC # BLD AUTO: 3.58 X10*6/UL (ref 4–5.2)
RBC # UR STRIP.AUTO: NEGATIVE /UL
RBC #/AREA URNS AUTO: ABNORMAL /HPF
SODIUM SERPL-SCNC: 136 MMOL/L (ref 136–145)
SP GR UR STRIP.AUTO: 1
UROBILINOGEN UR STRIP.AUTO-MCNC: <2 MG/DL
WBC # BLD AUTO: 14.5 X10*3/UL (ref 4.4–11.3)
WBC #/AREA URNS AUTO: ABNORMAL /HPF

## 2024-04-21 PROCEDURE — 80053 COMPREHEN METABOLIC PANEL: CPT | Performed by: STUDENT IN AN ORGANIZED HEALTH CARE EDUCATION/TRAINING PROGRAM

## 2024-04-21 PROCEDURE — 74177 CT ABD & PELVIS W/CONTRAST: CPT | Performed by: RADIOLOGY

## 2024-04-21 PROCEDURE — 2500000006 HC RX 250 W HCPCS SELF ADMINISTERED DRUGS (ALT 637 FOR ALL PAYERS): Performed by: STUDENT IN AN ORGANIZED HEALTH CARE EDUCATION/TRAINING PROGRAM

## 2024-04-21 PROCEDURE — 2500000001 HC RX 250 WO HCPCS SELF ADMINISTERED DRUGS (ALT 637 FOR MEDICARE OP): Performed by: STUDENT IN AN ORGANIZED HEALTH CARE EDUCATION/TRAINING PROGRAM

## 2024-04-21 PROCEDURE — 2550000001 HC RX 255 CONTRASTS: Performed by: SURGERY

## 2024-04-21 PROCEDURE — 71045 X-RAY EXAM CHEST 1 VIEW: CPT

## 2024-04-21 PROCEDURE — 71045 X-RAY EXAM CHEST 1 VIEW: CPT | Performed by: RADIOLOGY

## 2024-04-21 PROCEDURE — 83690 ASSAY OF LIPASE: CPT | Performed by: STUDENT IN AN ORGANIZED HEALTH CARE EDUCATION/TRAINING PROGRAM

## 2024-04-21 PROCEDURE — 87075 CULTR BACTERIA EXCEPT BLOOD: CPT | Mod: PARLAB | Performed by: STUDENT IN AN ORGANIZED HEALTH CARE EDUCATION/TRAINING PROGRAM

## 2024-04-21 PROCEDURE — 87040 BLOOD CULTURE FOR BACTERIA: CPT | Mod: PARLAB | Performed by: STUDENT IN AN ORGANIZED HEALTH CARE EDUCATION/TRAINING PROGRAM

## 2024-04-21 PROCEDURE — 74177 CT ABD & PELVIS W/CONTRAST: CPT

## 2024-04-21 PROCEDURE — 1100000001 HC PRIVATE ROOM DAILY

## 2024-04-21 PROCEDURE — 2500000004 HC RX 250 GENERAL PHARMACY W/ HCPCS (ALT 636 FOR OP/ED): Mod: JZ | Performed by: STUDENT IN AN ORGANIZED HEALTH CARE EDUCATION/TRAINING PROGRAM

## 2024-04-21 PROCEDURE — 36415 COLL VENOUS BLD VENIPUNCTURE: CPT | Performed by: STUDENT IN AN ORGANIZED HEALTH CARE EDUCATION/TRAINING PROGRAM

## 2024-04-21 PROCEDURE — 81001 URINALYSIS AUTO W/SCOPE: CPT | Performed by: STUDENT IN AN ORGANIZED HEALTH CARE EDUCATION/TRAINING PROGRAM

## 2024-04-21 PROCEDURE — 2500000004 HC RX 250 GENERAL PHARMACY W/ HCPCS (ALT 636 FOR OP/ED): Performed by: STUDENT IN AN ORGANIZED HEALTH CARE EDUCATION/TRAINING PROGRAM

## 2024-04-21 PROCEDURE — 85025 COMPLETE CBC W/AUTO DIFF WBC: CPT | Performed by: STUDENT IN AN ORGANIZED HEALTH CARE EDUCATION/TRAINING PROGRAM

## 2024-04-21 RX ORDER — POTASSIUM CHLORIDE 1.5 G/1.58G
40 POWDER, FOR SOLUTION ORAL ONCE
Status: COMPLETED | OUTPATIENT
Start: 2024-04-21 | End: 2024-04-21

## 2024-04-21 RX ORDER — PANTOPRAZOLE SODIUM 40 MG/1
40 TABLET, DELAYED RELEASE ORAL
Status: DISCONTINUED | OUTPATIENT
Start: 2024-04-21 | End: 2024-05-02 | Stop reason: HOSPADM

## 2024-04-21 RX ORDER — BUPROPION HYDROCHLORIDE 150 MG/1
300 TABLET ORAL DAILY
Status: DISCONTINUED | OUTPATIENT
Start: 2024-04-21 | End: 2024-05-02 | Stop reason: HOSPADM

## 2024-04-21 RX ADMIN — ACETAMINOPHEN 975 MG: 325 TABLET ORAL at 09:16

## 2024-04-21 RX ADMIN — METRONIDAZOLE 500 MG: 500 INJECTION, SOLUTION INTRAVENOUS at 20:29

## 2024-04-21 RX ADMIN — OXYCODONE HYDROCHLORIDE 5 MG: 5 SOLUTION ORAL at 03:54

## 2024-04-21 RX ADMIN — METRONIDAZOLE 500 MG: 500 INJECTION, SOLUTION INTRAVENOUS at 12:07

## 2024-04-21 RX ADMIN — METRONIDAZOLE 500 MG: 500 INJECTION, SOLUTION INTRAVENOUS at 03:49

## 2024-04-21 RX ADMIN — POTASSIUM CHLORIDE 40 MEQ: 1.5 POWDER, FOR SOLUTION ORAL at 12:06

## 2024-04-21 RX ADMIN — OXYCODONE HYDROCHLORIDE 5 MG: 5 SOLUTION ORAL at 08:02

## 2024-04-21 RX ADMIN — TRAZODONE HYDROCHLORIDE 100 MG: 50 TABLET ORAL at 20:36

## 2024-04-21 RX ADMIN — OXYCODONE HYDROCHLORIDE 5 MG: 5 SOLUTION ORAL at 16:05

## 2024-04-21 RX ADMIN — ACETAMINOPHEN 975 MG: 325 TABLET ORAL at 03:47

## 2024-04-21 RX ADMIN — ONDANSETRON 4 MG: 2 INJECTION INTRAMUSCULAR; INTRAVENOUS at 12:25

## 2024-04-21 RX ADMIN — IOHEXOL 75 ML: 350 INJECTION, SOLUTION INTRAVENOUS at 14:16

## 2024-04-21 RX ADMIN — HEPARIN SODIUM 5000 UNITS: 5000 INJECTION INTRAVENOUS; SUBCUTANEOUS at 08:02

## 2024-04-21 RX ADMIN — HEPARIN SODIUM 5000 UNITS: 5000 INJECTION INTRAVENOUS; SUBCUTANEOUS at 16:06

## 2024-04-21 RX ADMIN — HEPARIN SODIUM 5000 UNITS: 5000 INJECTION INTRAVENOUS; SUBCUTANEOUS at 00:03

## 2024-04-21 RX ADMIN — BUPROPION HYDROCHLORIDE 300 MG: 150 TABLET, EXTENDED RELEASE ORAL at 09:15

## 2024-04-21 RX ADMIN — ACETAMINOPHEN 975 MG: 325 TABLET ORAL at 14:45

## 2024-04-21 RX ADMIN — POLYETHYLENE GLYCOL 3350 17 G: 17 POWDER, FOR SOLUTION ORAL at 08:01

## 2024-04-21 RX ADMIN — MAGNESIUM HYDROXIDE 30 ML: 400 SUSPENSION ORAL at 08:01

## 2024-04-21 RX ADMIN — OXYCODONE HYDROCHLORIDE 5 MG: 5 SOLUTION ORAL at 12:07

## 2024-04-21 RX ADMIN — CIPROFLOXACIN 400 MG: 400 INJECTION, SOLUTION INTRAVENOUS at 08:02

## 2024-04-21 RX ADMIN — SERTRALINE HYDROCHLORIDE 100 MG: 100 TABLET ORAL at 08:02

## 2024-04-21 RX ADMIN — PANTOPRAZOLE SODIUM 40 MG: 40 TABLET, DELAYED RELEASE ORAL at 16:38

## 2024-04-21 RX ADMIN — BUPROPION HYDROCHLORIDE 150 MG: 150 TABLET, FILM COATED, EXTENDED RELEASE ORAL at 08:02

## 2024-04-21 RX ADMIN — OXYCODONE HYDROCHLORIDE 5 MG: 5 SOLUTION ORAL at 20:36

## 2024-04-21 RX ADMIN — CIPROFLOXACIN 400 MG: 400 INJECTION, SOLUTION INTRAVENOUS at 20:37

## 2024-04-21 ASSESSMENT — COGNITIVE AND FUNCTIONAL STATUS - GENERAL
MOBILITY SCORE: 24
DAILY ACTIVITIY SCORE: 24

## 2024-04-21 ASSESSMENT — PAIN - FUNCTIONAL ASSESSMENT
PAIN_FUNCTIONAL_ASSESSMENT: 0-10

## 2024-04-21 ASSESSMENT — PAIN SCALES - GENERAL
PAINLEVEL_OUTOF10: 2
PAINLEVEL_OUTOF10: 4
PAINLEVEL_OUTOF10: 3
PAINLEVEL_OUTOF10: 2
PAINLEVEL_OUTOF10: 4
PAINLEVEL_OUTOF10: 2
PAINLEVEL_OUTOF10: 2
PAINLEVEL_OUTOF10: 4

## 2024-04-21 NOTE — PROGRESS NOTES
"Mandy Ramirez is a 63 y.o. female on day 4 of admission presenting with Cholecystitis.    Subjective   Patient feels well. No nausea/vomiting with regular diet. She is ambulating. No fever/chills. No burning with urination. No cough or chest pain. Feels slightly bloated. After questioning the patient, she thinks the drain looks longer than before. Still constipated but feels like she will have a BM.       Objective     Physical Exam  Vitals reviewed.   Constitutional:       Appearance: Normal appearance. She is obese.   HENT:      Head: Normocephalic and atraumatic.      Nose: Nose normal.      Mouth/Throat:      Mouth: Mucous membranes are moist.   Eyes:      Extraocular Movements: Extraocular movements intact.      Pupils: Pupils are equal, round, and reactive to light.   Cardiovascular:      Rate and Rhythm: Normal rate and regular rhythm.   Pulmonary:      Effort: Pulmonary effort is normal.   Abdominal:      Palpations: Abdomen is soft.      Tenderness: There is abdominal tenderness (mild incisional).      Comments: Incisions c/d/i   Musculoskeletal:         General: Normal range of motion.      Cervical back: Normal range of motion and neck supple.   Skin:     General: Skin is warm and dry.      Capillary Refill: Capillary refill takes less than 2 seconds.   Neurological:      General: No focal deficit present.      Mental Status: She is alert.   Psychiatric:         Mood and Affect: Mood normal.         Behavior: Behavior normal.         Thought Content: Thought content normal.         Judgment: Judgment normal.     Drain: 10cc/24h bile tinged. Can see the black dot outside of the body.    Last Recorded Vitals  Blood pressure 110/74, pulse 85, temperature 36 °C (96.8 °F), resp. rate 18, height 1.727 m (5' 8\"), weight 97.1 kg (214 lb), SpO2 93%.  Intake/Output last 3 Shifts:  I/O last 3 completed shifts:  In: 3900 (40.2 mL/kg) [P.O.:2000; IV Piggyback:1900]  Out: 3830 (39.5 mL/kg) [Urine:3800 (1.1 " mL/kg/hr); Drains:30]  Weight: 97.1 kg     Relevant Results                  Results for orders placed or performed during the hospital encounter of 04/17/24 (from the past 24 hour(s))   Comprehensive Metabolic Panel   Result Value Ref Range    Glucose 90 74 - 99 mg/dL    Sodium 136 136 - 145 mmol/L    Potassium 3.2 (L) 3.5 - 5.3 mmol/L    Chloride 101 98 - 107 mmol/L    Bicarbonate 28 21 - 32 mmol/L    Anion Gap 10 10 - 20 mmol/L    Urea Nitrogen 5 (L) 6 - 23 mg/dL    Creatinine 0.65 0.50 - 1.05 mg/dL    eGFR >90 >60 mL/min/1.73m*2    Calcium 8.1 (L) 8.6 - 10.3 mg/dL    Albumin 2.8 (L) 3.4 - 5.0 g/dL    Alkaline Phosphatase 43 33 - 136 U/L    Total Protein 4.9 (L) 6.4 - 8.2 g/dL    AST 18 9 - 39 U/L    Bilirubin, Total 0.8 0.0 - 1.2 mg/dL    ALT 18 7 - 45 U/L   CBC and Auto Differential   Result Value Ref Range    WBC 14.5 (H) 4.4 - 11.3 x10*3/uL    nRBC 0.0 0.0 - 0.0 /100 WBCs    RBC 3.58 (L) 4.00 - 5.20 x10*6/uL    Hemoglobin 10.9 (L) 12.0 - 16.0 g/dL    Hematocrit 32.9 (L) 36.0 - 46.0 %    MCV 92 80 - 100 fL    MCH 30.4 26.0 - 34.0 pg    MCHC 33.1 32.0 - 36.0 g/dL    RDW 13.4 11.5 - 14.5 %    Platelets 245 150 - 450 x10*3/uL    Neutrophils % 77.2 40.0 - 80.0 %    Immature Granulocytes %, Automated 0.7 0.0 - 0.9 %    Lymphocytes % 10.5 13.0 - 44.0 %    Monocytes % 6.0 2.0 - 10.0 %    Eosinophils % 5.3 0.0 - 6.0 %    Basophils % 0.3 0.0 - 2.0 %    Neutrophils Absolute 11.18 (H) 1.20 - 7.70 x10*3/uL    Immature Granulocytes Absolute, Automated 0.10 0.00 - 0.70 x10*3/uL    Lymphocytes Absolute 1.52 1.20 - 4.80 x10*3/uL    Monocytes Absolute 0.87 0.10 - 1.00 x10*3/uL    Eosinophils Absolute 0.76 (H) 0.00 - 0.70 x10*3/uL    Basophils Absolute 0.04 0.00 - 0.10 x10*3/uL       Assessment/Plan   Principal Problem:    Cholecystitis  Active Problems:    Choledocholithiasis with acute cholecystitis    Gallstones    63F w/ h/o RYGB s/p lap subtotal cholecystectomy with cholangiogram and ERCP through gastric remnant for CHD  stone admitted for further management.   She has uptrending leukocytosis without change in vitals or abdominal exam.      -Keep drain in place   -Ciproflagyl for bile leak   -Pain control - scheduled tylenol, oxy q4h, toradol   -Repeat morning labs tomorrow   -Will obtain CXR, UA, blood cultures and CT with PO and IV contrast.   -SCDs, SQH, ambulation    Jenny Junior MD

## 2024-04-22 LAB
ALBUMIN SERPL BCP-MCNC: 3 G/DL (ref 3.4–5)
ALP SERPL-CCNC: 50 U/L (ref 33–136)
ALT SERPL W P-5'-P-CCNC: 19 U/L (ref 7–45)
ANION GAP SERPL CALC-SCNC: 10 MMOL/L (ref 10–20)
AST SERPL W P-5'-P-CCNC: 22 U/L (ref 9–39)
BASOPHILS # BLD AUTO: 0.05 X10*3/UL (ref 0–0.1)
BASOPHILS NFR BLD AUTO: 0.3 %
BILIRUB SERPL-MCNC: 0.7 MG/DL (ref 0–1.2)
BUN SERPL-MCNC: 6 MG/DL (ref 6–23)
CALCIUM SERPL-MCNC: 8.2 MG/DL (ref 8.6–10.3)
CHLORIDE SERPL-SCNC: 100 MMOL/L (ref 98–107)
CO2 SERPL-SCNC: 29 MMOL/L (ref 21–32)
CREAT SERPL-MCNC: 0.63 MG/DL (ref 0.5–1.05)
EGFRCR SERPLBLD CKD-EPI 2021: >90 ML/MIN/1.73M*2
EOSINOPHIL # BLD AUTO: 0.8 X10*3/UL (ref 0–0.7)
EOSINOPHIL NFR BLD AUTO: 4.9 %
ERYTHROCYTE [DISTWIDTH] IN BLOOD BY AUTOMATED COUNT: 13.8 % (ref 11.5–14.5)
GLUCOSE SERPL-MCNC: 86 MG/DL (ref 74–99)
HCT VFR BLD AUTO: 34.8 % (ref 36–46)
HGB BLD-MCNC: 11.5 G/DL (ref 12–16)
IMM GRANULOCYTES # BLD AUTO: 0.25 X10*3/UL (ref 0–0.7)
IMM GRANULOCYTES NFR BLD AUTO: 1.5 % (ref 0–0.9)
LYMPHOCYTES # BLD AUTO: 1.44 X10*3/UL (ref 1.2–4.8)
LYMPHOCYTES NFR BLD AUTO: 8.9 %
MCH RBC QN AUTO: 30.7 PG (ref 26–34)
MCHC RBC AUTO-ENTMCNC: 33 G/DL (ref 32–36)
MCV RBC AUTO: 93 FL (ref 80–100)
MONOCYTES # BLD AUTO: 1.58 X10*3/UL (ref 0.1–1)
MONOCYTES NFR BLD AUTO: 9.8 %
NEUTROPHILS # BLD AUTO: 12.07 X10*3/UL (ref 1.2–7.7)
NEUTROPHILS NFR BLD AUTO: 74.6 %
NRBC BLD-RTO: 0 /100 WBCS (ref 0–0)
PLATELET # BLD AUTO: 311 X10*3/UL (ref 150–450)
POTASSIUM SERPL-SCNC: 3.6 MMOL/L (ref 3.5–5.3)
PROT SERPL-MCNC: 5.2 G/DL (ref 6.4–8.2)
RBC # BLD AUTO: 3.74 X10*6/UL (ref 4–5.2)
SODIUM SERPL-SCNC: 135 MMOL/L (ref 136–145)
WBC # BLD AUTO: 16.2 X10*3/UL (ref 4.4–11.3)

## 2024-04-22 PROCEDURE — 2500000001 HC RX 250 WO HCPCS SELF ADMINISTERED DRUGS (ALT 637 FOR MEDICARE OP): Performed by: STUDENT IN AN ORGANIZED HEALTH CARE EDUCATION/TRAINING PROGRAM

## 2024-04-22 PROCEDURE — 85025 COMPLETE CBC W/AUTO DIFF WBC: CPT | Performed by: STUDENT IN AN ORGANIZED HEALTH CARE EDUCATION/TRAINING PROGRAM

## 2024-04-22 PROCEDURE — 2500000006 HC RX 250 W HCPCS SELF ADMINISTERED DRUGS (ALT 637 FOR ALL PAYERS): Performed by: STUDENT IN AN ORGANIZED HEALTH CARE EDUCATION/TRAINING PROGRAM

## 2024-04-22 PROCEDURE — 36415 COLL VENOUS BLD VENIPUNCTURE: CPT | Performed by: STUDENT IN AN ORGANIZED HEALTH CARE EDUCATION/TRAINING PROGRAM

## 2024-04-22 PROCEDURE — 1100000001 HC PRIVATE ROOM DAILY

## 2024-04-22 PROCEDURE — 80053 COMPREHEN METABOLIC PANEL: CPT | Performed by: STUDENT IN AN ORGANIZED HEALTH CARE EDUCATION/TRAINING PROGRAM

## 2024-04-22 PROCEDURE — 2500000004 HC RX 250 GENERAL PHARMACY W/ HCPCS (ALT 636 FOR OP/ED): Performed by: STUDENT IN AN ORGANIZED HEALTH CARE EDUCATION/TRAINING PROGRAM

## 2024-04-22 PROCEDURE — 2500000004 HC RX 250 GENERAL PHARMACY W/ HCPCS (ALT 636 FOR OP/ED): Mod: JZ | Performed by: STUDENT IN AN ORGANIZED HEALTH CARE EDUCATION/TRAINING PROGRAM

## 2024-04-22 PROCEDURE — 2500000005 HC RX 250 GENERAL PHARMACY W/O HCPCS: Performed by: STUDENT IN AN ORGANIZED HEALTH CARE EDUCATION/TRAINING PROGRAM

## 2024-04-22 RX ORDER — LIDOCAINE 560 MG/1
1 PATCH PERCUTANEOUS; TOPICAL; TRANSDERMAL DAILY
Status: DISCONTINUED | OUTPATIENT
Start: 2024-04-22 | End: 2024-05-02 | Stop reason: HOSPADM

## 2024-04-22 RX ADMIN — HEPARIN SODIUM 5000 UNITS: 5000 INJECTION INTRAVENOUS; SUBCUTANEOUS at 08:55

## 2024-04-22 RX ADMIN — HEPARIN SODIUM 5000 UNITS: 5000 INJECTION INTRAVENOUS; SUBCUTANEOUS at 16:14

## 2024-04-22 RX ADMIN — METRONIDAZOLE 500 MG: 500 INJECTION, SOLUTION INTRAVENOUS at 03:45

## 2024-04-22 RX ADMIN — ACETAMINOPHEN 975 MG: 325 TABLET ORAL at 14:07

## 2024-04-22 RX ADMIN — HEPARIN SODIUM 5000 UNITS: 5000 INJECTION INTRAVENOUS; SUBCUTANEOUS at 00:23

## 2024-04-22 RX ADMIN — OXYCODONE HYDROCHLORIDE 5 MG: 5 SOLUTION ORAL at 05:40

## 2024-04-22 RX ADMIN — METRONIDAZOLE 500 MG: 500 INJECTION, SOLUTION INTRAVENOUS at 21:55

## 2024-04-22 RX ADMIN — TRAZODONE HYDROCHLORIDE 100 MG: 50 TABLET ORAL at 19:36

## 2024-04-22 RX ADMIN — ONDANSETRON 4 MG: 2 INJECTION INTRAMUSCULAR; INTRAVENOUS at 11:47

## 2024-04-22 RX ADMIN — SERTRALINE HYDROCHLORIDE 100 MG: 100 TABLET ORAL at 08:53

## 2024-04-22 RX ADMIN — BUPROPION HYDROCHLORIDE 150 MG: 150 TABLET, FILM COATED, EXTENDED RELEASE ORAL at 08:52

## 2024-04-22 RX ADMIN — LIDOCAINE 4% 1 PATCH: 40 PATCH TOPICAL at 11:47

## 2024-04-22 RX ADMIN — OXYCODONE HYDROCHLORIDE 5 MG: 5 SOLUTION ORAL at 10:05

## 2024-04-22 RX ADMIN — OXYCODONE HYDROCHLORIDE 10 MG: 5 SOLUTION ORAL at 19:35

## 2024-04-22 RX ADMIN — POLYETHYLENE GLYCOL 3350 17 G: 17 POWDER, FOR SOLUTION ORAL at 08:53

## 2024-04-22 RX ADMIN — OXYCODONE HYDROCHLORIDE 5 MG: 5 SOLUTION ORAL at 00:56

## 2024-04-22 RX ADMIN — CIPROFLOXACIN 400 MG: 400 INJECTION, SOLUTION INTRAVENOUS at 09:00

## 2024-04-22 RX ADMIN — OXYCODONE HYDROCHLORIDE 5 MG: 5 SOLUTION ORAL at 14:07

## 2024-04-22 RX ADMIN — METRONIDAZOLE 500 MG: 500 INJECTION, SOLUTION INTRAVENOUS at 11:52

## 2024-04-22 RX ADMIN — ACETAMINOPHEN 975 MG: 325 TABLET ORAL at 19:36

## 2024-04-22 RX ADMIN — BUPROPION HYDROCHLORIDE 300 MG: 150 TABLET, EXTENDED RELEASE ORAL at 08:53

## 2024-04-22 RX ADMIN — MAGNESIUM HYDROXIDE 30 ML: 400 SUSPENSION ORAL at 08:53

## 2024-04-22 RX ADMIN — CIPROFLOXACIN 400 MG: 400 INJECTION, SOLUTION INTRAVENOUS at 22:02

## 2024-04-22 RX ADMIN — ACETAMINOPHEN 975 MG: 325 TABLET ORAL at 08:52

## 2024-04-22 RX ADMIN — PANTOPRAZOLE SODIUM 40 MG: 40 TABLET, DELAYED RELEASE ORAL at 05:40

## 2024-04-22 RX ADMIN — ACETAMINOPHEN 975 MG: 325 TABLET ORAL at 03:45

## 2024-04-22 RX ADMIN — ONDANSETRON 4 MG: 4 TABLET, ORALLY DISINTEGRATING ORAL at 05:42

## 2024-04-22 ASSESSMENT — PAIN SCALES - GENERAL
PAINLEVEL_OUTOF10: 4
PAINLEVEL_OUTOF10: 5 - MODERATE PAIN
PAINLEVEL_OUTOF10: 5 - MODERATE PAIN
PAINLEVEL_OUTOF10: 3
PAINLEVEL_OUTOF10: 4
PAINLEVEL_OUTOF10: 2
PAINLEVEL_OUTOF10: 7
PAINLEVEL_OUTOF10: 4
PAINLEVEL_OUTOF10: 4
PAINLEVEL_OUTOF10: 2

## 2024-04-22 ASSESSMENT — PAIN - FUNCTIONAL ASSESSMENT
PAIN_FUNCTIONAL_ASSESSMENT: 0-10

## 2024-04-22 NOTE — CARE PLAN
The patient's goals for the shift include      The clinical goals for the shift include pain control    Problem: Pain  Goal: My pain/discomfort is manageable  Outcome: Progressing     Problem: Safety  Goal: Patient will be injury free during hospitalization  Outcome: Progressing     Problem: Safety  Goal: I will remain free of falls  Outcome: Progressing     Problem: Daily Care  Goal: Daily care needs are met  Outcome: Progressing     Problem: Discharge Barriers  Goal: My discharge needs are met  Outcome: Progressing

## 2024-04-22 NOTE — PROGRESS NOTES
"Mandy Ramirez is a 63 y.o. female on day 5 of admission presenting with Cholecystitis.    Subjective   Patient feels weak today and fatigued. No fever/chills. She has been ambulating. Her sciatica has been acting up. She has been tolerating her diet without issues. Still feels bloated but not worse compared to yesterday. Had BM after milk of mag and miralax.       Objective     Physical Exam  Vitals reviewed.   Constitutional:       Appearance: Normal appearance. She is obese.   HENT:      Head: Normocephalic and atraumatic.      Nose: Nose normal.      Mouth/Throat:      Mouth: Mucous membranes are moist.   Eyes:      Extraocular Movements: Extraocular movements intact.      Pupils: Pupils are equal, round, and reactive to light.   Cardiovascular:      Rate and Rhythm: Normal rate and regular rhythm.   Pulmonary:      Effort: Pulmonary effort is normal.   Abdominal:      Palpations: Abdomen is soft.      Tenderness: There is abdominal tenderness (mild incisional).      Comments: Incisions c/d/i   Musculoskeletal:         General: Normal range of motion.      Cervical back: Normal range of motion and neck supple.   Skin:     General: Skin is warm and dry.      Capillary Refill: Capillary refill takes less than 2 seconds.   Neurological:      General: No focal deficit present.      Mental Status: She is alert.   Psychiatric:         Mood and Affect: Mood normal.         Behavior: Behavior normal.         Thought Content: Thought content normal.         Judgment: Judgment normal.     Drain: 15cc/24h cloudy and bile tinged output  No evidence of erythema or blanching on abdomen     Last Recorded Vitals  Blood pressure 114/73, pulse 81, temperature 35.9 °C (96.6 °F), temperature source Temporal, resp. rate 18, height 1.727 m (5' 8\"), weight 97.1 kg (214 lb), SpO2 93%.  Intake/Output last 3 Shifts:  I/O last 3 completed shifts:  In: 3100 (31.9 mL/kg) [P.O.:2000; IV Piggyback:1100]  Out: 3125 (32.2 mL/kg) [Urine:3100 " (0.9 mL/kg/hr); Drains:25]  Weight: 97.1 kg     Relevant Results                Results for orders placed or performed during the hospital encounter of 04/17/24 (from the past 24 hour(s))   Urinalysis with Reflex Microscopic   Result Value Ref Range    Color, Urine Yellow Straw, Yellow    Appearance, Urine Clear Clear    Specific Gravity, Urine 1.004 (N) 1.005 - 1.035    pH, Urine 7.0 5.0, 5.5, 6.0, 6.5, 7.0, 7.5, 8.0    Protein, Urine NEGATIVE NEGATIVE mg/dL    Glucose, Urine NEGATIVE NEGATIVE mg/dL    Blood, Urine NEGATIVE NEGATIVE    Ketones, Urine NEGATIVE NEGATIVE mg/dL    Bilirubin, Urine NEGATIVE NEGATIVE    Urobilinogen, Urine <2.0 <2.0 mg/dL    Nitrite, Urine NEGATIVE NEGATIVE    Leukocyte Esterase, Urine SMALL (1+) (A) NEGATIVE   Microscopic Only, Urine   Result Value Ref Range    WBC, Urine 1-5 1-5, NONE /HPF    RBC, Urine 1-2 NONE, 1-2, 3-5 /HPF    Bacteria, Urine 1+ (A) NONE SEEN /HPF   Blood Culture    Specimen: Peripheral Venipuncture; Blood culture   Result Value Ref Range    Blood Culture Loaded on Instrument - Culture in progress    Blood Culture    Specimen: Peripheral Venipuncture; Blood culture   Result Value Ref Range    Blood Culture Loaded on Instrument - Culture in progress    Comprehensive Metabolic Panel   Result Value Ref Range    Glucose 86 74 - 99 mg/dL    Sodium 135 (L) 136 - 145 mmol/L    Potassium 3.6 3.5 - 5.3 mmol/L    Chloride 100 98 - 107 mmol/L    Bicarbonate 29 21 - 32 mmol/L    Anion Gap 10 10 - 20 mmol/L    Urea Nitrogen 6 6 - 23 mg/dL    Creatinine 0.63 0.50 - 1.05 mg/dL    eGFR >90 >60 mL/min/1.73m*2    Calcium 8.2 (L) 8.6 - 10.3 mg/dL    Albumin 3.0 (L) 3.4 - 5.0 g/dL    Alkaline Phosphatase 50 33 - 136 U/L    Total Protein 5.2 (L) 6.4 - 8.2 g/dL    AST 22 9 - 39 U/L    Bilirubin, Total 0.7 0.0 - 1.2 mg/dL    ALT 19 7 - 45 U/L   CBC and Auto Differential   Result Value Ref Range    WBC 16.2 (H) 4.4 - 11.3 x10*3/uL    nRBC 0.0 0.0 - 0.0 /100 WBCs    RBC 3.74 (L) 4.00 - 5.20  x10*6/uL    Hemoglobin 11.5 (L) 12.0 - 16.0 g/dL    Hematocrit 34.8 (L) 36.0 - 46.0 %    MCV 93 80 - 100 fL    MCH 30.7 26.0 - 34.0 pg    MCHC 33.0 32.0 - 36.0 g/dL    RDW 13.8 11.5 - 14.5 %    Platelets 311 150 - 450 x10*3/uL    Neutrophils % 74.6 40.0 - 80.0 %    Immature Granulocytes %, Automated 1.5 (H) 0.0 - 0.9 %    Lymphocytes % 8.9 13.0 - 44.0 %    Monocytes % 9.8 2.0 - 10.0 %    Eosinophils % 4.9 0.0 - 6.0 %    Basophils % 0.3 0.0 - 2.0 %    Neutrophils Absolute 12.07 (H) 1.20 - 7.70 x10*3/uL    Immature Granulocytes Absolute, Automated 0.25 0.00 - 0.70 x10*3/uL    Lymphocytes Absolute 1.44 1.20 - 4.80 x10*3/uL    Monocytes Absolute 1.58 (H) 0.10 - 1.00 x10*3/uL    Eosinophils Absolute 0.80 (H) 0.00 - 0.70 x10*3/uL    Basophils Absolute 0.05 0.00 - 0.10 x10*3/uL                  Assessment/Plan   Principal Problem:    Cholecystitis  Active Problems:    Choledocholithiasis with acute cholecystitis    Gallstones    63F w/ h/o RYGB s/p lap subtotal cholecystectomy with cholangiogram and ERCP through gastric remnant for CHD stone admitted for further management.   She has uptrending leukocytosis without change in vitals or abdominal exam. UA, CXR and blood cultures are clean so far.   CT suspicious for developing abscess/biloma, drain in appropriate position.     -Keep drain in place   -Consulting ID for IV abx given uptrending leukocytosis  -IV Ciproflagyl for now for bile leak   -Pain control - scheduled tylenol, oxy q4h, toradol. Adding lidocaine patch for back.   -Repeat morning labs tomorrow   -SCDs, SQH, ambulation     Jenny Junior MD

## 2024-04-22 NOTE — CARE PLAN
Problem: Pain  Goal: My pain/discomfort is manageable  Outcome: Progressing     Problem: Safety  Goal: Patient will be injury free during hospitalization  Outcome: Progressing  Goal: I will remain free of falls  Outcome: Progressing     Problem: Daily Care  Goal: Daily care needs are met  Outcome: Progressing     Problem: Psychosocial Needs  Goal: Demonstrates ability to cope with hospitalization/illness  Outcome: Progressing  Goal: Collaborate with me, my family, and caregiver to identify my specific goals  Outcome: Progressing     Problem: Discharge Barriers  Goal: My discharge needs are met  Outcome: Progressing   The patient's goals for the shift include      The clinical goals for the shift include pain control

## 2024-04-23 LAB
ALBUMIN SERPL BCP-MCNC: 2.9 G/DL (ref 3.4–5)
ALP SERPL-CCNC: 57 U/L (ref 33–136)
ALT SERPL W P-5'-P-CCNC: 20 U/L (ref 7–45)
ANION GAP SERPL CALC-SCNC: 10 MMOL/L (ref 10–20)
AST SERPL W P-5'-P-CCNC: 28 U/L (ref 9–39)
BASOPHILS # BLD AUTO: 0.1 X10*3/UL (ref 0–0.1)
BASOPHILS NFR BLD AUTO: 0.7 %
BILIRUB SERPL-MCNC: 0.5 MG/DL (ref 0–1.2)
BUN SERPL-MCNC: 6 MG/DL (ref 6–23)
CALCIUM SERPL-MCNC: 8.1 MG/DL (ref 8.6–10.3)
CHLORIDE SERPL-SCNC: 102 MMOL/L (ref 98–107)
CK SERPL-CCNC: 120 U/L (ref 0–215)
CO2 SERPL-SCNC: 30 MMOL/L (ref 21–32)
CREAT SERPL-MCNC: 0.63 MG/DL (ref 0.5–1.05)
EGFRCR SERPLBLD CKD-EPI 2021: >90 ML/MIN/1.73M*2
EOSINOPHIL # BLD AUTO: 0.89 X10*3/UL (ref 0–0.7)
EOSINOPHIL NFR BLD AUTO: 5.9 %
ERYTHROCYTE [DISTWIDTH] IN BLOOD BY AUTOMATED COUNT: 13.9 % (ref 11.5–14.5)
GLUCOSE SERPL-MCNC: 91 MG/DL (ref 74–99)
HCT VFR BLD AUTO: 34 % (ref 36–46)
HGB BLD-MCNC: 11.2 G/DL (ref 12–16)
HOLD SPECIMEN: NORMAL
IMM GRANULOCYTES # BLD AUTO: 0.64 X10*3/UL (ref 0–0.7)
IMM GRANULOCYTES NFR BLD AUTO: 4.3 % (ref 0–0.9)
LYMPHOCYTES # BLD AUTO: 1.59 X10*3/UL (ref 1.2–4.8)
LYMPHOCYTES NFR BLD AUTO: 10.6 %
MCH RBC QN AUTO: 30.2 PG (ref 26–34)
MCHC RBC AUTO-ENTMCNC: 32.9 G/DL (ref 32–36)
MCV RBC AUTO: 92 FL (ref 80–100)
MONOCYTES # BLD AUTO: 1.68 X10*3/UL (ref 0.1–1)
MONOCYTES NFR BLD AUTO: 11.2 %
NEUTROPHILS # BLD AUTO: 10.09 X10*3/UL (ref 1.2–7.7)
NEUTROPHILS NFR BLD AUTO: 67.3 %
NRBC BLD-RTO: 0 /100 WBCS (ref 0–0)
PLATELET # BLD AUTO: 347 X10*3/UL (ref 150–450)
POTASSIUM SERPL-SCNC: 3.8 MMOL/L (ref 3.5–5.3)
PROT SERPL-MCNC: 5.1 G/DL (ref 6.4–8.2)
RBC # BLD AUTO: 3.71 X10*6/UL (ref 4–5.2)
SODIUM SERPL-SCNC: 138 MMOL/L (ref 136–145)
WBC # BLD AUTO: 15 X10*3/UL (ref 4.4–11.3)

## 2024-04-23 PROCEDURE — 82435 ASSAY OF BLOOD CHLORIDE: CPT | Performed by: STUDENT IN AN ORGANIZED HEALTH CARE EDUCATION/TRAINING PROGRAM

## 2024-04-23 PROCEDURE — 2500000006 HC RX 250 W HCPCS SELF ADMINISTERED DRUGS (ALT 637 FOR ALL PAYERS): Performed by: STUDENT IN AN ORGANIZED HEALTH CARE EDUCATION/TRAINING PROGRAM

## 2024-04-23 PROCEDURE — 2500000001 HC RX 250 WO HCPCS SELF ADMINISTERED DRUGS (ALT 637 FOR MEDICARE OP): Performed by: STUDENT IN AN ORGANIZED HEALTH CARE EDUCATION/TRAINING PROGRAM

## 2024-04-23 PROCEDURE — 82550 ASSAY OF CK (CPK): CPT | Performed by: INTERNAL MEDICINE

## 2024-04-23 PROCEDURE — 2500000004 HC RX 250 GENERAL PHARMACY W/ HCPCS (ALT 636 FOR OP/ED): Performed by: INTERNAL MEDICINE

## 2024-04-23 PROCEDURE — 85025 COMPLETE CBC W/AUTO DIFF WBC: CPT | Performed by: STUDENT IN AN ORGANIZED HEALTH CARE EDUCATION/TRAINING PROGRAM

## 2024-04-23 PROCEDURE — 87070 CULTURE OTHR SPECIMN AEROBIC: CPT | Mod: PARLAB | Performed by: INTERNAL MEDICINE

## 2024-04-23 PROCEDURE — 2500000004 HC RX 250 GENERAL PHARMACY W/ HCPCS (ALT 636 FOR OP/ED): Performed by: STUDENT IN AN ORGANIZED HEALTH CARE EDUCATION/TRAINING PROGRAM

## 2024-04-23 PROCEDURE — 87081 CULTURE SCREEN ONLY: CPT | Mod: PARLAB | Performed by: STUDENT IN AN ORGANIZED HEALTH CARE EDUCATION/TRAINING PROGRAM

## 2024-04-23 PROCEDURE — 2500000004 HC RX 250 GENERAL PHARMACY W/ HCPCS (ALT 636 FOR OP/ED): Mod: JZ | Performed by: STUDENT IN AN ORGANIZED HEALTH CARE EDUCATION/TRAINING PROGRAM

## 2024-04-23 PROCEDURE — 36415 COLL VENOUS BLD VENIPUNCTURE: CPT | Performed by: STUDENT IN AN ORGANIZED HEALTH CARE EDUCATION/TRAINING PROGRAM

## 2024-04-23 PROCEDURE — 2500000005 HC RX 250 GENERAL PHARMACY W/O HCPCS: Performed by: STUDENT IN AN ORGANIZED HEALTH CARE EDUCATION/TRAINING PROGRAM

## 2024-04-23 PROCEDURE — 1100000001 HC PRIVATE ROOM DAILY

## 2024-04-23 PROCEDURE — 84132 ASSAY OF SERUM POTASSIUM: CPT | Performed by: STUDENT IN AN ORGANIZED HEALTH CARE EDUCATION/TRAINING PROGRAM

## 2024-04-23 RX ORDER — ACETAMINOPHEN 500 MG
5 TABLET ORAL NIGHTLY
Status: DISCONTINUED | OUTPATIENT
Start: 2024-04-23 | End: 2024-05-02 | Stop reason: HOSPADM

## 2024-04-23 RX ORDER — GABAPENTIN 100 MG/1
200 CAPSULE ORAL EVERY 8 HOURS SCHEDULED
Status: DISCONTINUED | OUTPATIENT
Start: 2024-04-23 | End: 2024-05-02 | Stop reason: HOSPADM

## 2024-04-23 RX ORDER — LIDOCAINE HYDROCHLORIDE 10 MG/ML
5 INJECTION INFILTRATION; PERINEURAL ONCE
Status: DISCONTINUED | OUTPATIENT
Start: 2024-04-23 | End: 2024-05-02 | Stop reason: HOSPADM

## 2024-04-23 RX ADMIN — ACETAMINOPHEN 975 MG: 325 TABLET ORAL at 01:56

## 2024-04-23 RX ADMIN — OXYCODONE HYDROCHLORIDE 5 MG: 5 SOLUTION ORAL at 06:23

## 2024-04-23 RX ADMIN — BUPROPION HYDROCHLORIDE 150 MG: 150 TABLET, FILM COATED, EXTENDED RELEASE ORAL at 08:22

## 2024-04-23 RX ADMIN — OXYCODONE HYDROCHLORIDE 5 MG: 5 SOLUTION ORAL at 16:04

## 2024-04-23 RX ADMIN — PIPERACILLIN SODIUM AND TAZOBACTAM SODIUM 3.38 G: 3; .375 INJECTION, SOLUTION INTRAVENOUS at 15:50

## 2024-04-23 RX ADMIN — SERTRALINE HYDROCHLORIDE 100 MG: 100 TABLET ORAL at 08:23

## 2024-04-23 RX ADMIN — HEPARIN SODIUM 5000 UNITS: 5000 INJECTION INTRAVENOUS; SUBCUTANEOUS at 08:23

## 2024-04-23 RX ADMIN — DAPTOMYCIN 450 MG: 500 INJECTION, POWDER, LYOPHILIZED, FOR SOLUTION INTRAVENOUS at 16:23

## 2024-04-23 RX ADMIN — OXYCODONE HYDROCHLORIDE 5 MG: 5 SOLUTION ORAL at 10:40

## 2024-04-23 RX ADMIN — ACETAMINOPHEN 975 MG: 325 TABLET ORAL at 21:56

## 2024-04-23 RX ADMIN — LIDOCAINE 4% 1 PATCH: 40 PATCH TOPICAL at 08:21

## 2024-04-23 RX ADMIN — ONDANSETRON 4 MG: 4 TABLET, ORALLY DISINTEGRATING ORAL at 09:03

## 2024-04-23 RX ADMIN — MAGNESIUM HYDROXIDE 30 ML: 400 SUSPENSION ORAL at 00:56

## 2024-04-23 RX ADMIN — PIPERACILLIN SODIUM AND TAZOBACTAM SODIUM 3.38 G: 3; .375 INJECTION, SOLUTION INTRAVENOUS at 21:55

## 2024-04-23 RX ADMIN — GABAPENTIN 200 MG: 100 CAPSULE ORAL at 15:45

## 2024-04-23 RX ADMIN — HEPARIN SODIUM 5000 UNITS: 5000 INJECTION INTRAVENOUS; SUBCUTANEOUS at 00:10

## 2024-04-23 RX ADMIN — OXYCODONE HYDROCHLORIDE 5 MG: 5 SOLUTION ORAL at 20:09

## 2024-04-23 RX ADMIN — GABAPENTIN 200 MG: 100 CAPSULE ORAL at 21:56

## 2024-04-23 RX ADMIN — POLYETHYLENE GLYCOL 3350 17 G: 17 POWDER, FOR SOLUTION ORAL at 08:23

## 2024-04-23 RX ADMIN — OXYCODONE HYDROCHLORIDE 5 MG: 5 SOLUTION ORAL at 01:56

## 2024-04-23 RX ADMIN — METRONIDAZOLE 500 MG: 500 INJECTION, SOLUTION INTRAVENOUS at 06:20

## 2024-04-23 RX ADMIN — TRAZODONE HYDROCHLORIDE 100 MG: 50 TABLET ORAL at 21:56

## 2024-04-23 RX ADMIN — HEPARIN SODIUM 5000 UNITS: 5000 INJECTION INTRAVENOUS; SUBCUTANEOUS at 15:45

## 2024-04-23 RX ADMIN — ACETAMINOPHEN 975 MG: 325 TABLET ORAL at 15:45

## 2024-04-23 RX ADMIN — Medication 5 MG: at 21:56

## 2024-04-23 RX ADMIN — BUPROPION HYDROCHLORIDE 300 MG: 150 TABLET, EXTENDED RELEASE ORAL at 08:25

## 2024-04-23 RX ADMIN — PANTOPRAZOLE SODIUM 40 MG: 40 TABLET, DELAYED RELEASE ORAL at 06:21

## 2024-04-23 ASSESSMENT — COGNITIVE AND FUNCTIONAL STATUS - GENERAL
MOBILITY SCORE: 24
MOBILITY SCORE: 24
DAILY ACTIVITIY SCORE: 24
DAILY ACTIVITIY SCORE: 24

## 2024-04-23 ASSESSMENT — PAIN - FUNCTIONAL ASSESSMENT
PAIN_FUNCTIONAL_ASSESSMENT: 0-10

## 2024-04-23 ASSESSMENT — PAIN SCALES - GENERAL
PAINLEVEL_OUTOF10: 3
PAINLEVEL_OUTOF10: 5 - MODERATE PAIN
PAINLEVEL_OUTOF10: 4
PAINLEVEL_OUTOF10: 4
PAINLEVEL_OUTOF10: 6
PAINLEVEL_OUTOF10: 6
PAINLEVEL_OUTOF10: 4
PAINLEVEL_OUTOF10: 5 - MODERATE PAIN

## 2024-04-23 NOTE — PROGRESS NOTES
Spoke to patient at bedside regarding dispo planning. Current [facility] score 24. Patient anticipates no home going needs at this time. ID following for antibiotic management. Awaiting final cultures. Pain management. Pt from home with spouse. TCC to continue to follow for discharge planning.     UPDATE 1444: Message from provider, patient will need IV antibiotics at discharge. Spoke with patient and  at bedside. Patient prefers [facility] infusion.  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 Awaiting final cultures and final IV antibiotics. Currently on Dapto and Zosyn. [facility] infusion updated. PICC ordered, awaiting placement. ADOD Fri 4/26.     AISLINN HUERTAS, AKOSUA TCC

## 2024-04-23 NOTE — CARE PLAN
The patient's goals for the shift include  rest and comfort    The clinical goals for the shift include pain control

## 2024-04-23 NOTE — CARE PLAN
The patient's goals for the shift include      The clinical goals for the shift include pain control      Problem: Pain  Goal: My pain/discomfort is manageable  Outcome: Progressing  Flowsheets (Taken 4/23/2024 0955)  Resident's pain/discomfort is manageable: Include resident/family/caregiver in decisions related to pain management     Problem: Safety  Goal: Patient will be injury free during hospitalization  Outcome: Progressing  Goal: I will remain free of falls  Outcome: Progressing  Flowsheets (Taken 4/23/2024 0955)  Resident will remain free of falls: Apply bed/chair alarms as appropriate     Problem: Daily Care  Goal: Daily care needs are met  Outcome: Progressing  Flowsheets (Taken 4/23/2024 0955)  Daily care needs are met: Assess and monitor ability to perform self care and identify potential discharge needs     Problem: Psychosocial Needs  Goal: Demonstrates ability to cope with hospitalization/illness  Outcome: Progressing  Flowsheets (Taken 4/23/2024 0955)  Demonstrates ability to cope with hospitalization/illness: Provide low-stimulation environment as needed  Goal: Collaborate with me, my family, and caregiver to identify my specific goals  Outcome: Progressing  Flowsheets (Taken 4/20/2024 1900 by Swetha Lennon, RN)  Cultural Requests During Hospitalization: na  Spiritual Requests During Hospitalization: na     Problem: Discharge Barriers  Goal: My discharge needs are met  Outcome: Progressing  Flowsheets (Taken 4/23/2024 0955)  Resident's discharge needs are met: Identify potential discharge barriers on admission and throughout stay

## 2024-04-23 NOTE — CONSULTS
Consults  Referring physician Dr. Bedoya  History of present illness    This is a 63-year-old female who underwent laparoscopic cholecystectomy and also a hiatal hernia repair.  She then had a drain placed and now comes in with worsening abdominal pain.  We were called for further antibiotic management since her white count is increasing.    Past medical history she has had a laparoscopic Thelma-en-Y bypass, cholecystectomy as above with hiatal hernia repair    Family history noncontributory  Social history no drinking smoking drug use  Allergies no known antibiotic allergies    A 10 point review system was obtained with the patient denying any complaints outside of those listed in the HPI above which is pain on the right side    Physical exam  HEENT PERRLA  Chest fair air entry bilaterally  CVS S1-S2 regular  Abdomen soft tender on the right side with a drain in place.  There is purulent discharge around the introitus of the drain and the drain is also draining purulent fluid    Impression:    63-year-old female underwent cholecystectomy now with a bile leak drain in place and also had an ERCP.  The patient has multiple fluid collections which could be developing abscesses and a leukocytosis.  She is still on ciprofloxacin and Flagyl so at this time will recommend the following    Suggestion:  1.  I will DC Cipro Flagyl changed to daptomycin and Zosyn  2.  I sent off cultures and the GABRIEL drain so I can help guide the antibiotic management  3.  Will speak with Dr. Rodriges about getting interventional radiology to drain some of these fluid collections  4.  Will order dressing changes since her dressing has not been removed and patient is concerned of the purulent drainage around the drain site    Thank for this consult follow with you as needed  I reviewed and interpreted all lab test imaging studies and documentation from other healthcare providers    I am monitoring antibiotics for side effects, toxicity

## 2024-04-23 NOTE — PROGRESS NOTES
"Mandy Ramirez is a 63 y.o. female on day 6 of admission presenting with Cholecystitis.    Subjective   Patient is doing well overall but feels sleepy. Feels bloated but not more than prior. Minimal nausea, no vomiting. Low appetite. She is ambulating and sitting up to the chair. The bed is bothering her because it was broken. She id having a difficult time sleeping at night. WBC went down today. No fever/chills. ID consult: daptomycin and zosyn.     Objective     Physical Exam  Vitals reviewed.   Constitutional:       Appearance: Normal appearance. She is obese.   HENT:      Head: Normocephalic and atraumatic.      Nose: Nose normal.      Mouth/Throat:      Mouth: Mucous membranes are moist.   Eyes:      Extraocular Movements: Extraocular movements intact.      Pupils: Pupils are equal, round, and reactive to light.   Cardiovascular:      Rate and Rhythm: Normal rate and regular rhythm.   Pulmonary:      Effort: Pulmonary effort is normal.   Abdominal:      Palpations: Abdomen is soft.      Tenderness: There is abdominal tenderness (mild incisional).      Comments: Incisions c/d/i   Musculoskeletal:         General: Normal range of motion.      Cervical back: Normal range of motion and neck supple.   Skin:     General: Skin is warm and dry.      Capillary Refill: Capillary refill takes less than 2 seconds.   Neurological:      General: No focal deficit present.      Mental Status: She is alert.   Psychiatric:         Mood and Affect: Mood normal.         Behavior: Behavior normal.         Thought Content: Thought content normal.         Judgment: Judgment normal.      Drain: 10cc/24h cloudy output  Developing induration and cellulitis at the drain site. No crepitus. No fluctuation.    Last Recorded Vitals  Blood pressure 125/76, pulse 83, temperature 36.6 °C (97.9 °F), temperature source Temporal, resp. rate 18, height 1.727 m (5' 8\"), weight 97.1 kg (214 lb), SpO2 96%.  Intake/Output last 3 Shifts:  I/O last 3 " completed shifts:  In: 700 (7.2 mL/kg) [IV Piggyback:700]  Out: 2925 (30.1 mL/kg) [Urine:2900 (0.8 mL/kg/hr); Drains:25]  Weight: 97.1 kg     Relevant Results      Results for orders placed or performed during the hospital encounter of 04/17/24 (from the past 24 hour(s))   CBC and Auto Differential   Result Value Ref Range    WBC 15.0 (H) 4.4 - 11.3 x10*3/uL    nRBC 0.0 0.0 - 0.0 /100 WBCs    RBC 3.71 (L) 4.00 - 5.20 x10*6/uL    Hemoglobin 11.2 (L) 12.0 - 16.0 g/dL    Hematocrit 34.0 (L) 36.0 - 46.0 %    MCV 92 80 - 100 fL    MCH 30.2 26.0 - 34.0 pg    MCHC 32.9 32.0 - 36.0 g/dL    RDW 13.9 11.5 - 14.5 %    Platelets 347 150 - 450 x10*3/uL    Neutrophils % 67.3 40.0 - 80.0 %    Immature Granulocytes %, Automated 4.3 (H) 0.0 - 0.9 %    Lymphocytes % 10.6 13.0 - 44.0 %    Monocytes % 11.2 2.0 - 10.0 %    Eosinophils % 5.9 0.0 - 6.0 %    Basophils % 0.7 0.0 - 2.0 %    Neutrophils Absolute 10.09 (H) 1.20 - 7.70 x10*3/uL    Immature Granulocytes Absolute, Automated 0.64 0.00 - 0.70 x10*3/uL    Lymphocytes Absolute 1.59 1.20 - 4.80 x10*3/uL    Monocytes Absolute 1.68 (H) 0.10 - 1.00 x10*3/uL    Eosinophils Absolute 0.89 (H) 0.00 - 0.70 x10*3/uL    Basophils Absolute 0.10 0.00 - 0.10 x10*3/uL   Green Top   Result Value Ref Range    Extra Tube Hold for add-ons.    Comprehensive metabolic panel   Result Value Ref Range    Glucose 91 74 - 99 mg/dL    Sodium 138 136 - 145 mmol/L    Potassium 3.8 3.5 - 5.3 mmol/L    Chloride 102 98 - 107 mmol/L    Bicarbonate 30 21 - 32 mmol/L    Anion Gap 10 10 - 20 mmol/L    Urea Nitrogen 6 6 - 23 mg/dL    Creatinine 0.63 0.50 - 1.05 mg/dL    eGFR >90 >60 mL/min/1.73m*2    Calcium 8.1 (L) 8.6 - 10.3 mg/dL    Albumin 2.9 (L) 3.4 - 5.0 g/dL    Alkaline Phosphatase 57 33 - 136 U/L    Total Protein 5.1 (L) 6.4 - 8.2 g/dL    AST 28 9 - 39 U/L    Bilirubin, Total 0.5 0.0 - 1.2 mg/dL    ALT 20 7 - 45 U/L   Creatine Kinase   Result Value Ref Range    Creatine Kinase 120 0 - 215 U/L        Assessment/Plan   Principal Problem:    Cholecystitis  Active Problems:    Choledocholithiasis with acute cholecystitis    Gallstones    63F w/ h/o RYGB s/p lap subtotal cholecystectomy with cholangiogram and ERCP through gastric remnant for CHD stone admitted for further management.   She has uptrending leukocytosis without change in vitals or abdominal exam. UA, CXR and blood cultures are clean so far.   CT suspicious for developing abscess/biloma, drain in appropriate position. ID consulted: dapto and zosyn + IR consult.     -Keep drain in place   -ID consult appreciated - will need IV abx for home and wanted IR consult. PICC line ordered.  -IV Dapto and zosyn   -Pain control - scheduled tylenol, oxy q4h, toradol. Lidocaine patch for back. Ordering gabapentin. Melatonin at night.  -Repeat morning labs tomorrow   -SCDs, SQH, ambulation     Jenny Junior MD

## 2024-04-24 ENCOUNTER — HOME HEALTH ADMISSION (OUTPATIENT)
Dept: HOME HEALTH SERVICES | Facility: HOME HEALTH | Age: 64
End: 2024-04-24
Payer: COMMERCIAL

## 2024-04-24 ENCOUNTER — APPOINTMENT (OUTPATIENT)
Dept: RADIOLOGY | Facility: HOSPITAL | Age: 64
DRG: 417 | End: 2024-04-24
Payer: COMMERCIAL

## 2024-04-24 LAB
ALBUMIN SERPL BCP-MCNC: 2.8 G/DL (ref 3.4–5)
ALBUMIN SERPL BCP-MCNC: 3.1 G/DL (ref 3.4–5)
ALP SERPL-CCNC: 52 U/L (ref 33–136)
ALP SERPL-CCNC: 61 U/L (ref 33–136)
ALT SERPL W P-5'-P-CCNC: 18 U/L (ref 7–45)
ALT SERPL W P-5'-P-CCNC: 19 U/L (ref 7–45)
ANION GAP SERPL CALC-SCNC: 10 MMOL/L (ref 10–20)
ANION GAP SERPL CALC-SCNC: 12 MMOL/L (ref 10–20)
APPEARANCE UR: CLEAR
AST SERPL W P-5'-P-CCNC: 24 U/L (ref 9–39)
AST SERPL W P-5'-P-CCNC: 26 U/L (ref 9–39)
BASOPHILS # BLD AUTO: 0.12 X10*3/UL (ref 0–0.1)
BASOPHILS # BLD AUTO: 0.15 X10*3/UL (ref 0–0.1)
BASOPHILS NFR BLD AUTO: 0.7 %
BASOPHILS NFR BLD AUTO: 1 %
BILIRUB SERPL-MCNC: 0.5 MG/DL (ref 0–1.2)
BILIRUB SERPL-MCNC: 0.5 MG/DL (ref 0–1.2)
BILIRUB UR STRIP.AUTO-MCNC: NEGATIVE MG/DL
BUN SERPL-MCNC: 6 MG/DL (ref 6–23)
BUN SERPL-MCNC: 6 MG/DL (ref 6–23)
CALCIUM SERPL-MCNC: 8.2 MG/DL (ref 8.6–10.3)
CALCIUM SERPL-MCNC: 8.5 MG/DL (ref 8.6–10.3)
CHLORIDE SERPL-SCNC: 101 MMOL/L (ref 98–107)
CHLORIDE SERPL-SCNC: 102 MMOL/L (ref 98–107)
CO2 SERPL-SCNC: 31 MMOL/L (ref 21–32)
CO2 SERPL-SCNC: 32 MMOL/L (ref 21–32)
COLOR UR: YELLOW
CREAT SERPL-MCNC: 0.63 MG/DL (ref 0.5–1.05)
CREAT SERPL-MCNC: 0.65 MG/DL (ref 0.5–1.05)
EGFRCR SERPLBLD CKD-EPI 2021: >90 ML/MIN/1.73M*2
EGFRCR SERPLBLD CKD-EPI 2021: >90 ML/MIN/1.73M*2
EOSINOPHIL # BLD AUTO: 0.71 X10*3/UL (ref 0–0.7)
EOSINOPHIL # BLD AUTO: 0.74 X10*3/UL (ref 0–0.7)
EOSINOPHIL NFR BLD AUTO: 4.6 %
EOSINOPHIL NFR BLD AUTO: 4.7 %
ERYTHROCYTE [DISTWIDTH] IN BLOOD BY AUTOMATED COUNT: 14 % (ref 11.5–14.5)
ERYTHROCYTE [DISTWIDTH] IN BLOOD BY AUTOMATED COUNT: 14.1 % (ref 11.5–14.5)
GLUCOSE SERPL-MCNC: 100 MG/DL (ref 74–99)
GLUCOSE SERPL-MCNC: 85 MG/DL (ref 74–99)
GLUCOSE UR STRIP.AUTO-MCNC: NEGATIVE MG/DL
HCT VFR BLD AUTO: 35.4 % (ref 36–46)
HCT VFR BLD AUTO: 38.8 % (ref 36–46)
HGB BLD-MCNC: 11.4 G/DL (ref 12–16)
HGB BLD-MCNC: 12.6 G/DL (ref 12–16)
IMM GRANULOCYTES # BLD AUTO: 1.23 X10*3/UL (ref 0–0.7)
IMM GRANULOCYTES # BLD AUTO: 1.5 X10*3/UL (ref 0–0.7)
IMM GRANULOCYTES NFR BLD AUTO: 8.1 % (ref 0–0.9)
IMM GRANULOCYTES NFR BLD AUTO: 9.3 % (ref 0–0.9)
INR PPP: 1.1 (ref 0.9–1.1)
KETONES UR STRIP.AUTO-MCNC: NEGATIVE MG/DL
LACTATE SERPL-SCNC: 0.9 MMOL/L (ref 0.4–2)
LEUKOCYTE ESTERASE UR QL STRIP.AUTO: NEGATIVE
LYMPHOCYTES # BLD AUTO: 1.92 X10*3/UL (ref 1.2–4.8)
LYMPHOCYTES # BLD AUTO: 2.11 X10*3/UL (ref 1.2–4.8)
LYMPHOCYTES NFR BLD AUTO: 12.7 %
LYMPHOCYTES NFR BLD AUTO: 13.1 %
MCH RBC QN AUTO: 29.5 PG (ref 26–34)
MCH RBC QN AUTO: 30 PG (ref 26–34)
MCHC RBC AUTO-ENTMCNC: 32.2 G/DL (ref 32–36)
MCHC RBC AUTO-ENTMCNC: 32.5 G/DL (ref 32–36)
MCV RBC AUTO: 92 FL (ref 80–100)
MCV RBC AUTO: 92 FL (ref 80–100)
MONOCYTES # BLD AUTO: 1.44 X10*3/UL (ref 0.1–1)
MONOCYTES # BLD AUTO: 1.63 X10*3/UL (ref 0.1–1)
MONOCYTES NFR BLD AUTO: 10.8 %
MONOCYTES NFR BLD AUTO: 8.9 %
NEUTROPHILS # BLD AUTO: 10.25 X10*3/UL (ref 1.2–7.7)
NEUTROPHILS # BLD AUTO: 9.5 X10*3/UL (ref 1.2–7.7)
NEUTROPHILS NFR BLD AUTO: 62.7 %
NEUTROPHILS NFR BLD AUTO: 63.4 %
NITRITE UR QL STRIP.AUTO: NEGATIVE
NRBC BLD-RTO: 0.1 /100 WBCS (ref 0–0)
NRBC BLD-RTO: 0.1 /100 WBCS (ref 0–0)
PH UR STRIP.AUTO: 7 [PH]
PLATELET # BLD AUTO: 419 X10*3/UL (ref 150–450)
PLATELET # BLD AUTO: 465 X10*3/UL (ref 150–450)
POTASSIUM SERPL-SCNC: 3.6 MMOL/L (ref 3.5–5.3)
POTASSIUM SERPL-SCNC: 4 MMOL/L (ref 3.5–5.3)
PROT SERPL-MCNC: 4.7 G/DL (ref 6.4–8.2)
PROT SERPL-MCNC: 5.7 G/DL (ref 6.4–8.2)
PROT UR STRIP.AUTO-MCNC: NEGATIVE MG/DL
PROTHROMBIN TIME: 12.9 SECONDS (ref 9.8–12.8)
RBC # BLD AUTO: 3.86 X10*6/UL (ref 4–5.2)
RBC # BLD AUTO: 4.2 X10*6/UL (ref 4–5.2)
RBC # UR STRIP.AUTO: NEGATIVE /UL
SODIUM SERPL-SCNC: 140 MMOL/L (ref 136–145)
SODIUM SERPL-SCNC: 140 MMOL/L (ref 136–145)
SP GR UR STRIP.AUTO: 1.03
UROBILINOGEN UR STRIP.AUTO-MCNC: <2 MG/DL
WBC # BLD AUTO: 15.1 X10*3/UL (ref 4.4–11.3)
WBC # BLD AUTO: 16.2 X10*3/UL (ref 4.4–11.3)

## 2024-04-24 PROCEDURE — 36415 COLL VENOUS BLD VENIPUNCTURE: CPT | Performed by: STUDENT IN AN ORGANIZED HEALTH CARE EDUCATION/TRAINING PROGRAM

## 2024-04-24 PROCEDURE — 2500000005 HC RX 250 GENERAL PHARMACY W/O HCPCS: Performed by: STUDENT IN AN ORGANIZED HEALTH CARE EDUCATION/TRAINING PROGRAM

## 2024-04-24 PROCEDURE — 2500000004 HC RX 250 GENERAL PHARMACY W/ HCPCS (ALT 636 FOR OP/ED): Performed by: INTERNAL MEDICINE

## 2024-04-24 PROCEDURE — 80053 COMPREHEN METABOLIC PANEL: CPT | Performed by: INTERNAL MEDICINE

## 2024-04-24 PROCEDURE — 81003 URINALYSIS AUTO W/O SCOPE: CPT | Performed by: INTERNAL MEDICINE

## 2024-04-24 PROCEDURE — 2500000006 HC RX 250 W HCPCS SELF ADMINISTERED DRUGS (ALT 637 FOR ALL PAYERS): Performed by: STUDENT IN AN ORGANIZED HEALTH CARE EDUCATION/TRAINING PROGRAM

## 2024-04-24 PROCEDURE — 2500000001 HC RX 250 WO HCPCS SELF ADMINISTERED DRUGS (ALT 637 FOR MEDICARE OP): Performed by: STUDENT IN AN ORGANIZED HEALTH CARE EDUCATION/TRAINING PROGRAM

## 2024-04-24 PROCEDURE — 36569 INSJ PICC 5 YR+ W/O IMAGING: CPT

## 2024-04-24 PROCEDURE — 87075 CULTR BACTERIA EXCEPT BLOOD: CPT | Mod: PARLAB | Performed by: INTERNAL MEDICINE

## 2024-04-24 PROCEDURE — 02HV33Z INSERTION OF INFUSION DEVICE INTO SUPERIOR VENA CAVA, PERCUTANEOUS APPROACH: ICD-10-PCS | Performed by: SURGERY

## 2024-04-24 PROCEDURE — 85025 COMPLETE CBC W/AUTO DIFF WBC: CPT | Performed by: STUDENT IN AN ORGANIZED HEALTH CARE EDUCATION/TRAINING PROGRAM

## 2024-04-24 PROCEDURE — 87040 BLOOD CULTURE FOR BACTERIA: CPT | Mod: PARLAB | Performed by: INTERNAL MEDICINE

## 2024-04-24 PROCEDURE — 83605 ASSAY OF LACTIC ACID: CPT | Performed by: INTERNAL MEDICINE

## 2024-04-24 PROCEDURE — 84450 TRANSFERASE (AST) (SGOT): CPT | Performed by: STUDENT IN AN ORGANIZED HEALTH CARE EDUCATION/TRAINING PROGRAM

## 2024-04-24 PROCEDURE — 85610 PROTHROMBIN TIME: CPT | Performed by: STUDENT IN AN ORGANIZED HEALTH CARE EDUCATION/TRAINING PROGRAM

## 2024-04-24 PROCEDURE — 97161 PT EVAL LOW COMPLEX 20 MIN: CPT | Mod: GP

## 2024-04-24 PROCEDURE — 2500000004 HC RX 250 GENERAL PHARMACY W/ HCPCS (ALT 636 FOR OP/ED): Performed by: STUDENT IN AN ORGANIZED HEALTH CARE EDUCATION/TRAINING PROGRAM

## 2024-04-24 PROCEDURE — 85025 COMPLETE CBC W/AUTO DIFF WBC: CPT | Performed by: INTERNAL MEDICINE

## 2024-04-24 PROCEDURE — 80053 COMPREHEN METABOLIC PANEL: CPT | Performed by: STUDENT IN AN ORGANIZED HEALTH CARE EDUCATION/TRAINING PROGRAM

## 2024-04-24 PROCEDURE — 36415 COLL VENOUS BLD VENIPUNCTURE: CPT | Performed by: INTERNAL MEDICINE

## 2024-04-24 PROCEDURE — 1100000001 HC PRIVATE ROOM DAILY

## 2024-04-24 RX ORDER — PIPERACILLIN SODIUM, TAZOBACTAM SODIUM 3; .375 G/15ML; G/15ML
3.38 INJECTION, POWDER, LYOPHILIZED, FOR SOLUTION INTRAVENOUS EVERY 6 HOURS
Qty: 189 G | Refills: 0 | Status: SHIPPED
Start: 2024-04-24 | End: 2024-04-26 | Stop reason: HOSPADM

## 2024-04-24 RX ORDER — DAPTOMYCIN 50 MG/ML
6 INJECTION, POWDER, LYOPHILIZED, FOR SOLUTION INTRAVENOUS DAILY
Qty: 8156.4 MG | Refills: 0 | Status: SHIPPED
Start: 2024-04-24 | End: 2024-04-26 | Stop reason: HOSPADM

## 2024-04-24 RX ADMIN — LIDOCAINE 4% 1 PATCH: 40 PATCH TOPICAL at 08:22

## 2024-04-24 RX ADMIN — PIPERACILLIN SODIUM AND TAZOBACTAM SODIUM 3.38 G: 3; .375 INJECTION, SOLUTION INTRAVENOUS at 08:23

## 2024-04-24 RX ADMIN — ACETAMINOPHEN 975 MG: 325 TABLET ORAL at 08:18

## 2024-04-24 RX ADMIN — PIPERACILLIN SODIUM AND TAZOBACTAM SODIUM 3.38 G: 3; .375 INJECTION, SOLUTION INTRAVENOUS at 21:07

## 2024-04-24 RX ADMIN — BUPROPION HYDROCHLORIDE 150 MG: 150 TABLET, FILM COATED, EXTENDED RELEASE ORAL at 08:17

## 2024-04-24 RX ADMIN — GABAPENTIN 200 MG: 100 CAPSULE ORAL at 14:32

## 2024-04-24 RX ADMIN — PANTOPRAZOLE SODIUM 40 MG: 40 TABLET, DELAYED RELEASE ORAL at 06:12

## 2024-04-24 RX ADMIN — TRAZODONE HYDROCHLORIDE 100 MG: 50 TABLET ORAL at 21:07

## 2024-04-24 RX ADMIN — ACETAMINOPHEN 975 MG: 325 TABLET ORAL at 21:07

## 2024-04-24 RX ADMIN — OXYCODONE HYDROCHLORIDE 5 MG: 5 SOLUTION ORAL at 12:23

## 2024-04-24 RX ADMIN — HEPARIN SODIUM 5000 UNITS: 5000 INJECTION INTRAVENOUS; SUBCUTANEOUS at 21:07

## 2024-04-24 RX ADMIN — GABAPENTIN 200 MG: 100 CAPSULE ORAL at 06:12

## 2024-04-24 RX ADMIN — MAGNESIUM HYDROXIDE 30 ML: 400 SUSPENSION ORAL at 08:22

## 2024-04-24 RX ADMIN — OXYCODONE HYDROCHLORIDE 5 MG: 5 SOLUTION ORAL at 17:00

## 2024-04-24 RX ADMIN — PIPERACILLIN SODIUM AND TAZOBACTAM SODIUM 3.38 G: 3; .375 INJECTION, SOLUTION INTRAVENOUS at 03:53

## 2024-04-24 RX ADMIN — Medication 5 MG: at 21:07

## 2024-04-24 RX ADMIN — DAPTOMYCIN 450 MG: 500 INJECTION, POWDER, LYOPHILIZED, FOR SOLUTION INTRAVENOUS at 12:23

## 2024-04-24 RX ADMIN — POLYETHYLENE GLYCOL 3350 17 G: 17 POWDER, FOR SOLUTION ORAL at 08:23

## 2024-04-24 RX ADMIN — BUPROPION HYDROCHLORIDE 300 MG: 150 TABLET, EXTENDED RELEASE ORAL at 08:17

## 2024-04-24 RX ADMIN — SERTRALINE HYDROCHLORIDE 100 MG: 100 TABLET ORAL at 08:18

## 2024-04-24 RX ADMIN — OXYCODONE HYDROCHLORIDE 5 MG: 5 SOLUTION ORAL at 08:42

## 2024-04-24 RX ADMIN — HEPARIN SODIUM 5000 UNITS: 5000 INJECTION INTRAVENOUS; SUBCUTANEOUS at 14:32

## 2024-04-24 RX ADMIN — OXYCODONE HYDROCHLORIDE 5 MG: 5 SOLUTION ORAL at 21:08

## 2024-04-24 RX ADMIN — GABAPENTIN 200 MG: 100 CAPSULE ORAL at 21:07

## 2024-04-24 RX ADMIN — PIPERACILLIN SODIUM AND TAZOBACTAM SODIUM 3.38 G: 3; .375 INJECTION, SOLUTION INTRAVENOUS at 14:32

## 2024-04-24 RX ADMIN — ACETAMINOPHEN 975 MG: 325 TABLET ORAL at 03:53

## 2024-04-24 ASSESSMENT — COGNITIVE AND FUNCTIONAL STATUS - GENERAL
MOBILITY SCORE: 21
WALKING IN HOSPITAL ROOM: A LITTLE
WALKING IN HOSPITAL ROOM: A LITTLE
MOBILITY SCORE: 21
MOVING TO AND FROM BED TO CHAIR: A LITTLE
MOVING TO AND FROM BED TO CHAIR: A LITTLE
CLIMB 3 TO 5 STEPS WITH RAILING: A LITTLE
DAILY ACTIVITIY SCORE: 24
CLIMB 3 TO 5 STEPS WITH RAILING: A LITTLE
DAILY ACTIVITIY SCORE: 24
MOBILITY SCORE: 24

## 2024-04-24 ASSESSMENT — PAIN SCALES - GENERAL
PAINLEVEL_OUTOF10: 4
PAINLEVEL_OUTOF10: 6
PAINLEVEL_OUTOF10: 4
PAINLEVEL_OUTOF10: 3
PAINLEVEL_OUTOF10: 3
PAINLEVEL_OUTOF10: 4
PAINLEVEL_OUTOF10: 2
PAINLEVEL_OUTOF10: 4

## 2024-04-24 ASSESSMENT — PAIN - FUNCTIONAL ASSESSMENT
PAIN_FUNCTIONAL_ASSESSMENT: 0-10

## 2024-04-24 NOTE — PROGRESS NOTES
Physical Therapy    Physical Therapy Evaluation    Patient Name: Mandy Ramirez  MRN: [id number]  Today's Date: 4/24/2024   Time Calculation  Start Time: 0930  Stop Time: 0953  Time Calculation (min): 23 min    Assessment/Plan   PT Assessment  End of Session Communication: Bedside nurse  End of Session Patient Position: Up in chair, Alarm off, not on at start of session  IP OR SWING BED PT PLAN  Inpatient or Swing Bed: Inpatient  PT Plan  PT Frequency: 2 times per week (1-2 visits)  PT Discharge Recommendations: Low intensity level of continued care  PT - OK to Discharge: Yes      Subjective   General Visit Information:  General  Reason for Referral:  (Lap loida,gastrostomy,hiatal hernia repair)  Referred By: Elke  Past Medical History Relevant to Rehab: pneumonia,sciatica,obesity ,DM,OA,HTN,anxiety/depression  Prior to Session Communication: Bedside nurse  Patient Position Received: Up in chair, Alarm off, not on at start of session  Home Living:  Home Living  Type of Home: House  Lives With: Spouse  Home Adaptive Equipment: Walker rolling or standard, Wheelchair-manual  Home Layout: One level  Home Access: Stairs to enter without rails (one step)  Prior Level of Function:  Prior Function Per Pt/Caregiver Report  Level of [place]: Independent with ADLs and functional transfers  Ambulatory Assistance: Independent  Prior Function Comments: one fall past 6 mos  Precautions:  Precautions  Post-Surgical Precautions: Abdominal surgery precautions  Vital Signs:       Objective   Pain:  Pain Assessment  Pain Score: 2 (surg area)  Cognition:  Cognition  Overall Cognitive Status: Within Functional Limits    General Assessments:    Functional Assessments:  Bed Mobility  Bed Mobility:  (in chair)    Transfers  Transfer:  (Ind)    Ambulation/Gait Training  Ambulation/Gait Training Performed:  (150 ft x 2 trials/no device/sup)  Extremity/Trunk Assessments:    Outcome Measures:  [facility] Basic Mobility  Turning from your back  to your side while in a flat bed without using bedrails: None  Moving from lying on your back to sitting on the side of a flat bed without using bedrails: None  Moving to and from bed to chair (including a wheelchair): A little  Standing up from a chair using your arms (e.g. wheelchair or bedside chair): None  To walk in hospital room: A little  Climbing 3-5 steps with railing: A little  Basic Mobility - Total Score: 21    Encounter Problems       Encounter Problems (Active)       PT Problem       ambulation       Start:  04/24/24    Expected End:  05/01/24       Ambulate ind 150 ft> w/o a device or approp device if needed         stairs       Start:  04/24/24    Expected End:  05/01/24       Ind 4 stairs/rail                Education Documentation  Mobility Training, taught by Gustabo Banks, PT at 4/24/2024  1:46 PM.  Learner: Patient  Readiness: Acceptance  Method: Explanation  Response: Verbalizes Understanding    Education Comments  No comments found.

## 2024-04-24 NOTE — PROGRESS NOTES
Dispo planning for [facility] infusion for IV antibiotics and PT/OT. PT recommending low.  Awaiting final cultures and final antibiotic recs from ID. Awaiting PICC placement. Need [facility], primary team notified need for [facility] for referral processing. [facility] to continue to follow for discharge planning.     UPDATE 1505: Final home care orders received. HC infusion updated. Awaiting referral process. ADOD Friday 4/26. Patient getting PICC line at this time.     IASLINN HUERTAS, RN [facility]

## 2024-04-24 NOTE — CARE PLAN
The patient's goals for the shift include      The clinical goals for the shift include comfort and safety      Problem: Pain  Goal: My pain/discomfort is manageable  Outcome: Progressing     Problem: Safety  Goal: Patient will be injury free during hospitalization  Outcome: Progressing     Problem: Daily Care  Goal: Daily care needs are met  Outcome: Progressing     Problem: Psychosocial Needs  Goal: Demonstrates ability to cope with hospitalization/illness  Outcome: Progressing  Goal: Collaborate with me, my family, and caregiver to identify my specific goals  Outcome: Progressing

## 2024-04-24 NOTE — PROGRESS NOTES
"Mandy Ramirez is a 63 y.o. female on day 7 of admission presenting with Cholecystitis.    Subjective   Patient feels better today. She had better sleep. No fever/chills. Gabapentin is working for her back/sciatica. She feels less bloated. Passing gas and Bms.        Objective     Physical Exam  Vitals reviewed.   Constitutional:       Appearance: Normal appearance. She is obese.   HENT:      Head: Normocephalic and atraumatic.      Nose: Nose normal.      Mouth/Throat:      Mouth: Mucous membranes are moist.   Eyes:      Extraocular Movements: Extraocular movements intact.      Pupils: Pupils are equal, round, and reactive to light.   Cardiovascular:      Rate and Rhythm: Normal rate and regular rhythm.   Pulmonary:      Effort: Pulmonary effort is normal.   Abdominal:      Palpations: Abdomen is soft.      Tenderness: There is abdominal tenderness (mild incisional).      Comments: Incisions c/d/i   Musculoskeletal:         General: Normal range of motion.      Cervical back: Normal range of motion and neck supple.   Skin:     General: Skin is warm and dry.      Capillary Refill: Capillary refill takes less than 2 seconds.   Neurological:      General: No focal deficit present.      Mental Status: She is alert.   Psychiatric:         Mood and Affect: Mood normal.         Behavior: Behavior normal.         Thought Content: Thought content normal.         Judgment: Judgment normal.      Drain: 40cc/24h cloudy output  Developing induration and cellulitis at the drain site. No crepitus. No fluctuation. Improved compared to yesterday.        Last Recorded Vitals  Blood pressure 129/76, pulse 71, temperature 35.6 °C (96.1 °F), temperature source Temporal, resp. rate 16, height 1.727 m (5' 8\"), weight 97.1 kg (214 lb), SpO2 94%.  Intake/Output last 3 Shifts:  I/O last 3 completed shifts:  In: 519 (5.3 mL/kg) [P.O.:360; IV Piggyback:159]  Out: 4350 (44.8 mL/kg) [Urine:4300 (1.2 mL/kg/hr); Drains:50]  Weight: 97.1 kg "     Relevant Results      Results for orders placed or performed during the hospital encounter of 04/17/24 (from the past 24 hour(s))   CBC and Auto Differential   Result Value Ref Range    WBC 15.1 (H) 4.4 - 11.3 x10*3/uL    nRBC 0.1 (H) 0.0 - 0.0 /100 WBCs    RBC 3.86 (L) 4.00 - 5.20 x10*6/uL    Hemoglobin 11.4 (L) 12.0 - 16.0 g/dL    Hematocrit 35.4 (L) 36.0 - 46.0 %    MCV 92 80 - 100 fL    MCH 29.5 26.0 - 34.0 pg    MCHC 32.2 32.0 - 36.0 g/dL    RDW 14.0 11.5 - 14.5 %    Platelets 419 150 - 450 x10*3/uL    Neutrophils % 62.7 40.0 - 80.0 %    Immature Granulocytes %, Automated 8.1 (H) 0.0 - 0.9 %    Lymphocytes % 12.7 13.0 - 44.0 %    Monocytes % 10.8 2.0 - 10.0 %    Eosinophils % 4.7 0.0 - 6.0 %    Basophils % 1.0 0.0 - 2.0 %    Neutrophils Absolute 9.50 (H) 1.20 - 7.70 x10*3/uL    Immature Granulocytes Absolute, Automated 1.23 (H) 0.00 - 0.70 x10*3/uL    Lymphocytes Absolute 1.92 1.20 - 4.80 x10*3/uL    Monocytes Absolute 1.63 (H) 0.10 - 1.00 x10*3/uL    Eosinophils Absolute 0.71 (H) 0.00 - 0.70 x10*3/uL    Basophils Absolute 0.15 (H) 0.00 - 0.10 x10*3/uL   Comprehensive Metabolic Panel   Result Value Ref Range    Glucose 85 74 - 99 mg/dL    Sodium 140 136 - 145 mmol/L    Potassium 4.0 3.5 - 5.3 mmol/L    Chloride 102 98 - 107 mmol/L    Bicarbonate 32 21 - 32 mmol/L    Anion Gap 10 10 - 20 mmol/L    Urea Nitrogen 6 6 - 23 mg/dL    Creatinine 0.65 0.50 - 1.05 mg/dL    eGFR >90 >60 mL/min/1.73m*2    Calcium 8.2 (L) 8.6 - 10.3 mg/dL    Albumin 2.8 (L) 3.4 - 5.0 g/dL    Alkaline Phosphatase 52 33 - 136 U/L    Total Protein 4.7 (L) 6.4 - 8.2 g/dL    AST 24 9 - 39 U/L    Bilirubin, Total 0.5 0.0 - 1.2 mg/dL    ALT 18 7 - 45 U/L   Protime-INR   Result Value Ref Range    Protime 12.9 (H) 9.8 - 12.8 seconds    INR 1.1 0.9 - 1.1   CBC and Auto Differential   Result Value Ref Range    WBC 16.2 (H) 4.4 - 11.3 x10*3/uL    nRBC 0.1 (H) 0.0 - 0.0 /100 WBCs    RBC 4.20 4.00 - 5.20 x10*6/uL    Hemoglobin 12.6 12.0 - 16.0 g/dL     Hematocrit 38.8 36.0 - 46.0 %    MCV 92 80 - 100 fL    MCH 30.0 26.0 - 34.0 pg    MCHC 32.5 32.0 - 36.0 g/dL    RDW 14.1 11.5 - 14.5 %    Platelets 465 (H) 150 - 450 x10*3/uL    Neutrophils % 63.4 40.0 - 80.0 %    Immature Granulocytes %, Automated 9.3 (H) 0.0 - 0.9 %    Lymphocytes % 13.1 13.0 - 44.0 %    Monocytes % 8.9 2.0 - 10.0 %    Eosinophils % 4.6 0.0 - 6.0 %    Basophils % 0.7 0.0 - 2.0 %    Neutrophils Absolute 10.25 (H) 1.20 - 7.70 x10*3/uL    Immature Granulocytes Absolute, Automated 1.50 (H) 0.00 - 0.70 x10*3/uL    Lymphocytes Absolute 2.11 1.20 - 4.80 x10*3/uL    Monocytes Absolute 1.44 (H) 0.10 - 1.00 x10*3/uL    Eosinophils Absolute 0.74 (H) 0.00 - 0.70 x10*3/uL    Basophils Absolute 0.12 (H) 0.00 - 0.10 x10*3/uL   Comprehensive Metabolic Panel   Result Value Ref Range    Glucose 100 (H) 74 - 99 mg/dL    Sodium 140 136 - 145 mmol/L    Potassium 3.6 3.5 - 5.3 mmol/L    Chloride 101 98 - 107 mmol/L    Bicarbonate 31 21 - 32 mmol/L    Anion Gap 12 10 - 20 mmol/L    Urea Nitrogen 6 6 - 23 mg/dL    Creatinine 0.63 0.50 - 1.05 mg/dL    eGFR >90 >60 mL/min/1.73m*2    Calcium 8.5 (L) 8.6 - 10.3 mg/dL    Albumin 3.1 (L) 3.4 - 5.0 g/dL    Alkaline Phosphatase 61 33 - 136 U/L    Total Protein 5.7 (L) 6.4 - 8.2 g/dL    AST 26 9 - 39 U/L    Bilirubin, Total 0.5 0.0 - 1.2 mg/dL    ALT 19 7 - 45 U/L   Lactate   Result Value Ref Range    Lactate 0.9 0.4 - 2.0 mmol/L                  Assessment/Plan   Principal Problem:    Cholecystitis  Active Problems:    Choledocholithiasis with acute cholecystitis    Gallstones      63F w/ h/o RYGB s/p lap subtotal cholecystectomy with cholangiogram and ERCP through gastric remnant for CHD stone admitted for further management.   She has uptrending leukocytosis without change in vitals or abdominal exam. UA, CXR and blood cultures are clean so far.   CT suspicious for developing abscess/biloma, drain in appropriate position. ID consulted: IV dapto and zosyn   -Keep drain in  place   -ID consult appreciated - will need IV abx for home and wanted IR consult. IR: nothing drainable at this point. PICC line ordered. Diet reordered.  -Pain control - scheduled tylenol, oxy q4h, toradol. Lidocaine patch for back. Gabapentin. Melatonin at night.  -Repeat morning labs tomorrow   -Home health care initiated.  -SCDs, SQH, ambulation    Jenny Junior MD

## 2024-04-24 NOTE — NURSING NOTE
Single lumen picc line placed 4/24/24 okay to placed by ID, Dr. Dunn, blood cultures still pending.

## 2024-04-24 NOTE — NURSING NOTE
Jenny Junior MD notified by Dr Moses that no fluid noted in CT and drain is not needed. Nursing notified.  Floor nurse notified.

## 2024-04-24 NOTE — PROGRESS NOTES
"Infectious disease progress note  Subjective   Abdominal abscess  Leukocytosis  Recent cholecystectomy    Antibiotics  Daptomycin day 2  Zosyn day 2  Objective   Range of Vitals (last 24 hours)  Heart Rate:  [71-91]   Temp:  [35.6 °C (96.1 °F)-36.4 °C (97.5 °F)]   Resp:  [16-18]   BP: (110-152)/(76-95)   SpO2:  [92 %-95 %]   Daily Weight  04/17/24 : 97.1 kg (214 lb)    Body mass index is 32.54 kg/m².      Physical Exam  Patient sitting in chair quite anxious wants to go home.  I long conversation with her and her  that the microbiology is still pending and that takes at least 48 hours to get the results back along with the PICC line which should have been placed since blood cultures have been -48 hours  HEENT PERRLA  Chest fair entry bilaterally  CVS S1-S2 regular  Abdomen patient has a drain in place to the right        Relevant Results  Labs  Lab Results   Component Value Date    WBC 16.2 (H) 04/24/2024    HGB 12.6 04/24/2024    HCT 38.8 04/24/2024    MCV 92 04/24/2024     (H) 04/24/2024     Lab Results   Component Value Date    GLUCOSE 100 (H) 04/24/2024    CALCIUM 8.5 (L) 04/24/2024     04/24/2024    K 3.6 04/24/2024    CO2 31 04/24/2024     04/24/2024    BUN 6 04/24/2024    CREATININE 0.63 04/24/2024   ESR: --No results found for: \"SEDRATE\"No results found for: \"CRP\"  Lab Results   Component Value Date    ALT 19 04/24/2024    AST 26 04/24/2024    ALKPHOS 61 04/24/2024    BILITOT 0.5 04/24/2024       Microbiology  4- blood cultures no growth at 2 days  4- blood cultures negative to date  4- fluid culture still pending  Imaging  4- CT of the abdomen shows a fluid collection with several air locules in the right upper abdomen this is nonspecific may be postsurgical secondary to biloma, bile leak abscess etc.  There is reticulation of the abdominal fat in the right upper abdomen    Assessment/Plan   1.  Continue daptomycin and Zosyn  2.  Await PICC line " placement  3.  Await culture results and then de-escalate antibiotics accordingly    Other issues  Thelma-en-Y bypass chronic for patient  Cholecystectomy as above  Hiatal hernia repair    I reviewed and interpreted all lab test imaging studies and documentations from other healthcare providers  I am monitoring for antibiotic side effects and toxicity     Maya Dunn MD

## 2024-04-24 NOTE — CARE PLAN
The patient's goals for the shift include      The clinical goals for the shift include comfort and safety      Problem: Pain  Goal: My pain/discomfort is manageable  Outcome: Progressing  Flowsheets (Taken 4/24/2024 0849)  Resident's pain/discomfort is manageable: Include resident/family/caregiver in decisions related to pain management     Problem: Safety  Goal: Patient will be injury free during hospitalization  Outcome: Progressing  Goal: I will remain free of falls  Outcome: Progressing  Flowsheets (Taken 4/24/2024 0849)  Resident will remain free of falls: Use gait belt for all transfers     Problem: Daily Care  Goal: Daily care needs are met  Outcome: Progressing  Flowsheets (Taken 4/24/2024 0849)  Daily care needs are met: Encourage independent activity per ability     Problem: Psychosocial Needs  Goal: Demonstrates ability to cope with hospitalization/illness  Outcome: Progressing  Flowsheets (Taken 4/24/2024 0849)  Demonstrates ability to cope with hospitalization/illness: Encourage resident to set and complete small goals for self  Goal: Collaborate with me, my family, and caregiver to identify my specific goals  Outcome: Progressing  Flowsheets (Taken 4/20/2024 1900 by Swetha Lennon, RN)  Cultural Requests During Hospitalization: na  Spiritual Requests During Hospitalization: na     Problem: Discharge Barriers  Goal: My discharge needs are met  Outcome: Progressing  Flowsheets (Taken 4/24/2024 0849)  Resident's discharge needs are met: Identify potential discharge barriers on admission and throughout stay

## 2024-04-25 ENCOUNTER — TELEPHONE (OUTPATIENT)
Dept: SURGERY | Facility: CLINIC | Age: 64
End: 2024-04-25

## 2024-04-25 ENCOUNTER — APPOINTMENT (OUTPATIENT)
Dept: VASCULAR MEDICINE | Facility: HOSPITAL | Age: 64
DRG: 417 | End: 2024-04-25
Payer: COMMERCIAL

## 2024-04-25 ENCOUNTER — APPOINTMENT (OUTPATIENT)
Dept: SURGERY | Facility: CLINIC | Age: 64
End: 2024-04-25
Payer: COMMERCIAL

## 2024-04-25 DIAGNOSIS — T81.40XS POSTOPERATIVE INFECTION, UNSPECIFIED TYPE, SEQUELA: ICD-10-CM

## 2024-04-25 LAB
ALBUMIN SERPL BCP-MCNC: 2.8 G/DL (ref 3.4–5)
ALP SERPL-CCNC: 50 U/L (ref 33–136)
ALT SERPL W P-5'-P-CCNC: 16 U/L (ref 7–45)
ANION GAP SERPL CALC-SCNC: 10 MMOL/L (ref 10–20)
AST SERPL W P-5'-P-CCNC: 21 U/L (ref 9–39)
BASOPHILS # BLD MANUAL: 0 X10*3/UL (ref 0–0.1)
BASOPHILS NFR BLD MANUAL: 0 %
BILIRUB SERPL-MCNC: 0.5 MG/DL (ref 0–1.2)
BODY SURFACE AREA: 2.16 M2
BUN SERPL-MCNC: 7 MG/DL (ref 6–23)
BURR CELLS BLD QL SMEAR: ABNORMAL
CALCIUM SERPL-MCNC: 8.1 MG/DL (ref 8.6–10.3)
CHLORIDE SERPL-SCNC: 103 MMOL/L (ref 98–107)
CO2 SERPL-SCNC: 29 MMOL/L (ref 21–32)
CREAT SERPL-MCNC: 0.64 MG/DL (ref 0.5–1.05)
EGFRCR SERPLBLD CKD-EPI 2021: >90 ML/MIN/1.73M*2
EOSINOPHIL # BLD MANUAL: 0.65 X10*3/UL (ref 0–0.7)
EOSINOPHIL NFR BLD MANUAL: 4 %
ERYTHROCYTE [DISTWIDTH] IN BLOOD BY AUTOMATED COUNT: 14 % (ref 11.5–14.5)
GLUCOSE SERPL-MCNC: 99 MG/DL (ref 74–99)
HCT VFR BLD AUTO: 33.2 % (ref 36–46)
HGB BLD-MCNC: 10.9 G/DL (ref 12–16)
IMM GRANULOCYTES # BLD AUTO: 1.63 X10*3/UL (ref 0–0.7)
IMM GRANULOCYTES NFR BLD AUTO: 10 % (ref 0–0.9)
LYMPHOCYTES # BLD MANUAL: 2.45 X10*3/UL (ref 1.2–4.8)
LYMPHOCYTES NFR BLD MANUAL: 15 %
MCH RBC QN AUTO: 29.9 PG (ref 26–34)
MCHC RBC AUTO-ENTMCNC: 32.8 G/DL (ref 32–36)
MCV RBC AUTO: 91 FL (ref 80–100)
METAMYELOCYTES # BLD MANUAL: 1.79 X10*3/UL
METAMYELOCYTES NFR BLD MANUAL: 11 %
MONOCYTES # BLD MANUAL: 0.98 X10*3/UL (ref 0.1–1)
MONOCYTES NFR BLD MANUAL: 6 %
NEUTROPHILS # BLD MANUAL: 10.43 X10*3/UL (ref 1.2–7.7)
NEUTS BAND # BLD MANUAL: 0.16 X10*3/UL (ref 0–0.7)
NEUTS BAND NFR BLD MANUAL: 1 %
NEUTS SEG # BLD MANUAL: 10.27 X10*3/UL (ref 1.2–7)
NEUTS SEG NFR BLD MANUAL: 63 %
NRBC BLD-RTO: 0.2 /100 WBCS (ref 0–0)
OVALOCYTES BLD QL SMEAR: ABNORMAL
PLATELET # BLD AUTO: 454 X10*3/UL (ref 150–450)
POLYCHROMASIA BLD QL SMEAR: ABNORMAL
POTASSIUM SERPL-SCNC: 3.7 MMOL/L (ref 3.5–5.3)
PROT SERPL-MCNC: 5.2 G/DL (ref 6.4–8.2)
RBC # BLD AUTO: 3.64 X10*6/UL (ref 4–5.2)
RBC MORPH BLD: ABNORMAL
SODIUM SERPL-SCNC: 138 MMOL/L (ref 136–145)
STAPHYLOCOCCUS SPEC CULT: NORMAL
TARGETS BLD QL SMEAR: ABNORMAL
TOTAL CELLS COUNTED BLD: 100
WBC # BLD AUTO: 16.3 X10*3/UL (ref 4.4–11.3)

## 2024-04-25 PROCEDURE — 2500000004 HC RX 250 GENERAL PHARMACY W/ HCPCS (ALT 636 FOR OP/ED): Performed by: INTERNAL MEDICINE

## 2024-04-25 PROCEDURE — 1100000001 HC PRIVATE ROOM DAILY

## 2024-04-25 PROCEDURE — 2500000001 HC RX 250 WO HCPCS SELF ADMINISTERED DRUGS (ALT 637 FOR MEDICARE OP): Performed by: STUDENT IN AN ORGANIZED HEALTH CARE EDUCATION/TRAINING PROGRAM

## 2024-04-25 PROCEDURE — 85007 BL SMEAR W/DIFF WBC COUNT: CPT | Performed by: STUDENT IN AN ORGANIZED HEALTH CARE EDUCATION/TRAINING PROGRAM

## 2024-04-25 PROCEDURE — 80053 COMPREHEN METABOLIC PANEL: CPT | Performed by: STUDENT IN AN ORGANIZED HEALTH CARE EDUCATION/TRAINING PROGRAM

## 2024-04-25 PROCEDURE — 2500000006 HC RX 250 W HCPCS SELF ADMINISTERED DRUGS (ALT 637 FOR ALL PAYERS): Performed by: STUDENT IN AN ORGANIZED HEALTH CARE EDUCATION/TRAINING PROGRAM

## 2024-04-25 PROCEDURE — 2500000005 HC RX 250 GENERAL PHARMACY W/O HCPCS: Performed by: STUDENT IN AN ORGANIZED HEALTH CARE EDUCATION/TRAINING PROGRAM

## 2024-04-25 PROCEDURE — 85027 COMPLETE CBC AUTOMATED: CPT | Performed by: STUDENT IN AN ORGANIZED HEALTH CARE EDUCATION/TRAINING PROGRAM

## 2024-04-25 PROCEDURE — 93970 EXTREMITY STUDY: CPT

## 2024-04-25 PROCEDURE — 2500000004 HC RX 250 GENERAL PHARMACY W/ HCPCS (ALT 636 FOR OP/ED): Performed by: STUDENT IN AN ORGANIZED HEALTH CARE EDUCATION/TRAINING PROGRAM

## 2024-04-25 PROCEDURE — 93970 EXTREMITY STUDY: CPT | Performed by: INTERNAL MEDICINE

## 2024-04-25 RX ORDER — PNV NO.95/FERROUS FUM/FOLIC AC 28MG-0.8MG
100 TABLET ORAL DAILY
Status: DISCONTINUED | OUTPATIENT
Start: 2024-04-25 | End: 2024-05-02 | Stop reason: HOSPADM

## 2024-04-25 RX ORDER — CHOLECALCIFEROL (VITAMIN D3) 25 MCG
1000 TABLET ORAL DAILY
Status: DISCONTINUED | OUTPATIENT
Start: 2024-04-25 | End: 2024-05-02 | Stop reason: HOSPADM

## 2024-04-25 RX ORDER — PYRIDOXINE HCL (VITAMIN B6) 25 MG
25 TABLET ORAL DAILY
Status: DISCONTINUED | OUTPATIENT
Start: 2024-04-25 | End: 2024-05-02 | Stop reason: HOSPADM

## 2024-04-25 RX ORDER — LEVOTHYROXINE SODIUM 50 UG/1
50 TABLET ORAL DAILY
Status: DISCONTINUED | OUTPATIENT
Start: 2024-04-26 | End: 2024-05-02 | Stop reason: HOSPADM

## 2024-04-25 RX ADMIN — ACETAMINOPHEN 975 MG: 325 TABLET ORAL at 08:02

## 2024-04-25 RX ADMIN — ACETAMINOPHEN 975 MG: 325 TABLET ORAL at 20:02

## 2024-04-25 RX ADMIN — GABAPENTIN 200 MG: 100 CAPSULE ORAL at 14:07

## 2024-04-25 RX ADMIN — POLYETHYLENE GLYCOL 3350 17 G: 17 POWDER, FOR SOLUTION ORAL at 08:02

## 2024-04-25 RX ADMIN — THIAMINE HYDROCHLORIDE 200 MG: 100 INJECTION, SOLUTION INTRAMUSCULAR; INTRAVENOUS at 17:14

## 2024-04-25 RX ADMIN — HEPARIN SODIUM 5000 UNITS: 5000 INJECTION INTRAVENOUS; SUBCUTANEOUS at 14:06

## 2024-04-25 RX ADMIN — ACETAMINOPHEN 975 MG: 325 TABLET ORAL at 14:07

## 2024-04-25 RX ADMIN — GABAPENTIN 200 MG: 100 CAPSULE ORAL at 20:02

## 2024-04-25 RX ADMIN — DAPTOMYCIN 450 MG: 500 INJECTION, POWDER, LYOPHILIZED, FOR SOLUTION INTRAVENOUS at 09:00

## 2024-04-25 RX ADMIN — LIDOCAINE 4% 1 PATCH: 40 PATCH TOPICAL at 08:01

## 2024-04-25 RX ADMIN — ACETAMINOPHEN 975 MG: 325 TABLET ORAL at 03:12

## 2024-04-25 RX ADMIN — VITAM B12 100 MCG: 100 TAB at 14:08

## 2024-04-25 RX ADMIN — PANTOPRAZOLE SODIUM 40 MG: 40 TABLET, DELAYED RELEASE ORAL at 05:13

## 2024-04-25 RX ADMIN — Medication 25 MG: at 14:08

## 2024-04-25 RX ADMIN — OXYCODONE HYDROCHLORIDE 5 MG: 5 SOLUTION ORAL at 17:23

## 2024-04-25 RX ADMIN — OXYCODONE HYDROCHLORIDE 5 MG: 5 SOLUTION ORAL at 06:18

## 2024-04-25 RX ADMIN — MAGNESIUM HYDROXIDE 30 ML: 400 SUSPENSION ORAL at 08:02

## 2024-04-25 RX ADMIN — CHOLECALCIFEROL TAB 25 MCG (1000 UNIT) 1000 UNITS: 25 TAB at 14:07

## 2024-04-25 RX ADMIN — HEPARIN SODIUM 5000 UNITS: 5000 INJECTION INTRAVENOUS; SUBCUTANEOUS at 05:13

## 2024-04-25 RX ADMIN — TRAZODONE HYDROCHLORIDE 100 MG: 50 TABLET ORAL at 21:43

## 2024-04-25 RX ADMIN — BUPROPION HYDROCHLORIDE 300 MG: 150 TABLET, EXTENDED RELEASE ORAL at 08:02

## 2024-04-25 RX ADMIN — PIPERACILLIN SODIUM AND TAZOBACTAM SODIUM 3.38 G: 3; .375 INJECTION, SOLUTION INTRAVENOUS at 14:07

## 2024-04-25 RX ADMIN — SERTRALINE HYDROCHLORIDE 100 MG: 100 TABLET ORAL at 08:02

## 2024-04-25 RX ADMIN — Medication 5 MG: at 21:43

## 2024-04-25 RX ADMIN — PIPERACILLIN SODIUM AND TAZOBACTAM SODIUM 3.38 G: 3; .375 INJECTION, SOLUTION INTRAVENOUS at 20:02

## 2024-04-25 RX ADMIN — BUPROPION HYDROCHLORIDE 150 MG: 150 TABLET, FILM COATED, EXTENDED RELEASE ORAL at 08:03

## 2024-04-25 RX ADMIN — PIPERACILLIN SODIUM AND TAZOBACTAM SODIUM 3.38 G: 3; .375 INJECTION, SOLUTION INTRAVENOUS at 03:12

## 2024-04-25 RX ADMIN — HEPARIN SODIUM 5000 UNITS: 5000 INJECTION INTRAVENOUS; SUBCUTANEOUS at 21:43

## 2024-04-25 RX ADMIN — OXYCODONE HYDROCHLORIDE 5 MG: 5 SOLUTION ORAL at 13:12

## 2024-04-25 RX ADMIN — PIPERACILLIN SODIUM AND TAZOBACTAM SODIUM 3.38 G: 3; .375 INJECTION, SOLUTION INTRAVENOUS at 08:02

## 2024-04-25 RX ADMIN — GABAPENTIN 200 MG: 100 CAPSULE ORAL at 05:13

## 2024-04-25 ASSESSMENT — COGNITIVE AND FUNCTIONAL STATUS - GENERAL
DAILY ACTIVITIY SCORE: 24
MOVING TO AND FROM BED TO CHAIR: A LITTLE
WALKING IN HOSPITAL ROOM: A LITTLE
WALKING IN HOSPITAL ROOM: A LITTLE
CLIMB 3 TO 5 STEPS WITH RAILING: A LITTLE
CLIMB 3 TO 5 STEPS WITH RAILING: A LITTLE
MOVING TO AND FROM BED TO CHAIR: A LITTLE
MOBILITY SCORE: 21
DAILY ACTIVITIY SCORE: 24
MOBILITY SCORE: 21

## 2024-04-25 ASSESSMENT — PAIN - FUNCTIONAL ASSESSMENT
PAIN_FUNCTIONAL_ASSESSMENT: 0-10

## 2024-04-25 ASSESSMENT — PAIN SCALES - GENERAL
PAINLEVEL_OUTOF10: 2
PAINLEVEL_OUTOF10: 6
PAINLEVEL_OUTOF10: 4
PAINLEVEL_OUTOF10: 4
PAINLEVEL_OUTOF10: 3
PAINLEVEL_OUTOF10: 4

## 2024-04-25 NOTE — TELEPHONE ENCOUNTER
Outbound call to radiology scheduling to schedule a CT abd/pel for the patient for Wednesday 5/1/24. Patient is scheduled for 5/1/24 at 11:00am. Arrive at 10:45am, clear liquids only starting at 8am.

## 2024-04-25 NOTE — H&P
"Infectious disease progress note  Subjective   Abdominal abscess  Leukocytosis  Recent cholecystectomy    Antibiotics  Daptomycin day 3 out of 28 days  Zosyn day 3 out of 28 days   Objective   Range of Vitals (last 24 hours)  Heart Rate:  [79-90]   Temp:  [35.5 °C (95.9 °F)-36.6 °C (97.9 °F)]   Resp:  [18]   BP: (104-119)/(67-85)   SpO2:  [93 %-98 %]   Daily Weight  04/17/24 : 97.1 kg (214 lb)    Body mass index is 32.54 kg/m².      Physical Exam    HEENT PERRLA  Chest fair entry bilaterally  CVS S1-S2 regular  Abdomen patient has a drain in place to the right       Relevant Results  Labs  Lab Results   Component Value Date    WBC 16.3 (H) 04/25/2024    HGB 10.9 (L) 04/25/2024    HCT 33.2 (L) 04/25/2024    MCV 91 04/25/2024     (H) 04/25/2024     Lab Results   Component Value Date    GLUCOSE 99 04/25/2024    CALCIUM 8.1 (L) 04/25/2024     04/25/2024    K 3.7 04/25/2024    CO2 29 04/25/2024     04/25/2024    BUN 7 04/25/2024    CREATININE 0.64 04/25/2024   ESR: --No results found for: \"SEDRATE\"No results found for: \"CRP\"  Lab Results   Component Value Date    ALT 16 04/25/2024    AST 21 04/25/2024    ALKPHOS 50 04/25/2024    BILITOT 0.5 04/25/2024       Microbiology  4- blood cultures pending negative to date  4- fluid cultures in progress    Imaging  4- CT of abdomen shows  The pneumoperitoneum along with collection with several air locules at the right upper abdomen extending inferiorly along the anterior pararenal fascia  4- chest x-ray development of atelectasis  Assessment/Plan   1.  Continue current antibiotics  2.  We will follow-up the cultures and if negative we will go ahead and treat with current antibiotics for a total of 28 days  3.  Case was discussed with the bariatrics follow  4.  PICC line was placed    Other issues  Thelma-en-Y bypass chronic for patient  Cholecystectomy as above  Hiatal hernia repair    I reviewed and interpreted all lab test imaging " studies and documentations from other healthcare providers  I am monitoring for antibiotic side effects and toxicity     Maya Dunn MD

## 2024-04-25 NOTE — CONSULTS
"Nutrition Initial Assessment:   Nutrition Assessment    Reason for Assessment: Length of stay    Patient is a 63 y.o. female presenting with cholecystitis.       Nutrition History:  Energy Intake: Fair 50-75 %  Food and Nutrient History: Pt reports eating well with no changes in intake PTA. Pt reports appetite is better than is was initially after surgery but not quite back to normal. Reports eating ~50% of meals. Documented meal intakes this admission include 0% x 2 meals, 50% x 2 meals, and 25% x 4 meals. Pt agreeable to ensure supplement daily until appetite is back to normal. Reports some diarrhea since it was her first BM since surgery, but feels BMs moving forward will be normal. Denies N/V. Reports dry mouth that makes swallowing difficult at times but denies choking on food/drink.  Vitamin/Herbal Supplement Use: vitamin D3, vitamin B12, Ferrous fumarate - vitamin C, fish oil, vitamin B6, per home med list  Food Allergies/Intolerances:   egg  GI Symptoms: Diarrhea  Oral Problems:  dry mouth       Anthropometrics:  Height: 172.7 cm (5' 8\")   Weight: 97.1 kg (214 lb)   BMI (Calculated): 32.55  IBW/kg (Dietitian Calculated): 63.6 kg          Weight History:   Wt Readings from Last 10 Encounters:   04/17/24 97.1 kg (214 lb)   04/12/24 97.1 kg (214 lb)   04/05/24 96.2 kg (212 lb)   04/04/24 97.2 kg (214 lb 4.6 oz)   03/28/24 96.2 kg (212 lb)   01/26/24 94.6 kg (208 lb 9.6 oz)   12/21/23 93.4 kg (206 lb)   05/22/23 88.9 kg (196 lb)   05/04/23 89.8 kg (198 lb)   05/01/23 89.6 kg (197 lb 7 oz)      Weight Change %:  Weight History / % Weight Change: Pt reports UBW is 214#. Denies wt changes. Slight wt gain noted over the last 1 year based on available wt records.    Nutrition Focused Physical Exam Findings:  defer: not indicated  Subcutaneous Fat Loss:   Orbital Fat Pads: Defer  Muscle Wasting:  Temporalis: Defer  Edema:  Edema: none  Physical Findings:  Skin: Positive (multiple abdominal incisions per nursing " assessment)    Nutrition Significant Labs:    Reviewed   Nutrition Specific Medications:  Reviewed     I/O:   Last BM Date: 04/25/24; Stool Appearance: Unable to assess (04/25/24 0857)    Dietary Orders (From admission, onward)       Start     Ordered    04/25/24 1046  Oral nutritional supplements  Until discontinued        Comments: Chocolate or strawberry   Question Answer Comment   Deliver with Lunch    Select supplement: Ensure High Protein        04/25/24 1046    04/24/24 1116  Adult diet Regular  Diet effective now        Question:  Diet type  Answer:  Regular    04/24/24 1115                     Estimated Needs:   Total Energy Estimated Needs (kCal): 1590 kCal  Method for Estimating Needs: 25 kcal/kg IBW  Total Protein Estimated Needs (g): 64 g  Method for Estimating Needs: 1 g/kg IBW  Total Fluid Estimated Needs (mL): 1590 mL  Method for Estimating Needs: 1 ml/kcal or per MD        Nutrition Diagnosis   Malnutrition Diagnosis  Patient has Malnutrition Diagnosis: No    Nutrition Diagnosis  Patient has Nutrition Diagnosis: Yes  Diagnosis Status (1): New  Nutrition Diagnosis 1: Inadequate oral intake  Related to (1): recent surgery, decreased appetite  As Evidenced by (1): meal intakes ranging from 0-50% during this admission       Nutrition Interventions/Recommendations         Nutrition Prescription:  Individualized Nutrition Prescription Provided for : Regular diet with ONS        Nutrition Interventions:   Interventions: Meals and snacks, Medical food supplement  Meals and Snacks: General healthful diet  Goal: Consumes 3 meals per day  Medical Food Supplement: Commercial beverage  Goal: Ensure High Protein daily (160 kcal and 16 g protein per serving) chocolate/strawberry         Nutrition Education:   Discussed relationship between labwork and nutrition status. Explained that low protein levels in bloodwork are not indicative of nutrition status in the setting of acute illness. Discussed need for ONS based  on estimated needs and current meal intake instead.        Nutrition Monitoring and Evaluation   Food/Nutrient Related History Monitoring  Monitoring and Evaluation Plan: Energy intake, Amount of food, Fluid intake  Energy Intake: Estimated energy intake  Criteria: Pt meets >75% of estimated energy needs  Fluid Intake: Estimated fluid intake  Criteria: Meets >75% of estimated fluid needs  Amount of Food: Estimated amout of food, Medical food intake  Criteria: Pt consumes >75% of meals and supplements    Body Composition/Growth/Weight History  Monitoring and Evaluation Plan: Weight  Weight: Measured weight  Criteria: Maintains stable weight         Nutrition Focused Physical Findings  Monitoring and Evaluation Plan: Skin  Skin: Impaired wound healing  Criteria: Promote wound healing through adequate nutrition       Time Spent/Follow-up Reminder:   Time Spent (min): 60 minutes  Last Date of Nutrition Visit: 04/25/24  Nutrition Follow-Up Needed?: 3-8 days  Follow up Comment: 5/1-5/3 [facility]

## 2024-04-25 NOTE — CARE PLAN
The patient's goals for the shift include      The clinical goals for the shift include Pain management      Problem: Pain  Goal: My pain/discomfort is manageable  Outcome: Progressing     Problem: Safety  Goal: Patient will be injury free during hospitalization  Outcome: Progressing     Problem: Psychosocial Needs  Goal: Demonstrates ability to cope with hospitalization/illness  Outcome: Progressing

## 2024-04-25 NOTE — PROGRESS NOTES
04/25/24 1014   Discharge Planning   Living Arrangements Spouse/significant other   Support Systems Spouse/significant other   Type of Residence Private residence   Home or Post Acute Services In home services   Type of Home Care Services Home nursing visits   Patient expects to be discharged to: Home with home infusion   Does the patient need discharge transport arranged? No     Met with the patient in the room, she states that her PICC line has been inserted. She lives at home with her , he is willing to learn home infusion. Will notify [facility] of PICC line insertion. Plan is for home with home IV infusion.    1:29 pm Noted that C needed to speak to the patient regarding costs. Met with the patient, gave her phone number for [facility] liaison. She states that she would call.  came to visit, he is agreeable to learn home infusion. Informed that tentative plan for discharge on the 26th.   2:16 pm [facility] asking for clarification from infectious disease regarding Daptomycin dosage. Request to MD regarding dose to attempt to deliver medication this pm.

## 2024-04-25 NOTE — PROGRESS NOTES
Physical Therapy                 Therapy Communication Note    Patient Name: Mandy Ramirez  MRN: [id number]  Today's Date: 4/25/2024     Discipline: Physical Therapy    Missed Visit Reason: Missed Visit Reason:  (at a test)    Missed Time: Attempt    Comment:

## 2024-04-25 NOTE — PROGRESS NOTES
Mandy Ramirez is a 63 y.o. female on day 8 of admission presenting with Cholecystitis.    Subjective   Patient received her PICC line yesterday. On zosyn and dapto. Had an episode of loose stools. No fever/chills.   She is tolerating her diet. Anxious about her vitamins. She complained of RLE swelling that had gone down slightly. No redness or pain. She has been ambulating. No nausea/vomiting. She wants to go home.       Objective     Physical Exam  Vitals reviewed.   Constitutional:       Appearance: Normal appearance. She is obese.   HENT:      Head: Normocephalic and atraumatic.      Nose: Nose normal.      Mouth/Throat:      Mouth: Mucous membranes are moist.   Eyes:      Extraocular Movements: Extraocular movements intact.      Pupils: Pupils are equal, round, and reactive to light.   Cardiovascular:      Rate and Rhythm: Normal rate and regular rhythm.   Pulmonary:      Effort: Pulmonary effort is normal.   Abdominal:      Palpations: Abdomen is soft.      Tenderness: There is abdominal tenderness (mild incisional).      Comments: Incisions c/d/i   Musculoskeletal:         General: Normal range of motion.      Cervical back: Normal range of motion and neck supple.  Right lower extremity 1+ edema to the ankle.   Skin:     General: Skin is warm and dry.      Capillary Refill: Capillary refill takes less than 2 seconds.   Neurological:      General: No focal deficit present.      Mental Status: She is alert.   Psychiatric:         Mood and Affect: Mood normal.         Behavior: Behavior normal.         Thought Content: Thought content normal.         Judgment: Judgment normal.      Drain: 10cc/24h cloudy output  Developing induration and cellulitis at the drain site. No crepitus. No fluctuation. Improving - marked site yesterday with redness resolving. Minimal purulence at the drain site. Dressing changed.    Last Recorded Vitals  Blood pressure 117/85, pulse 79, temperature 35.8 °C (96.4 °F), resp. rate 18,  "height 1.727 m (5' 8\"), weight 97.1 kg (214 lb), SpO2 95%.  Intake/Output last 3 Shifts:  I/O last 3 completed shifts:  In: 250 (2.6 mL/kg) [IV Piggyback:250]  Out: 3855 (39.7 mL/kg) [Urine:3800 (1.1 mL/kg/hr); Drains:55]  Weight: 97.1 kg     Relevant Results           Results for orders placed or performed during the hospital encounter of 04/17/24 (from the past 24 hour(s))   Urinalysis with Reflex Culture and Microscopic   Result Value Ref Range    Color, Urine Yellow Straw, Yellow    Appearance, Urine Clear Clear    Specific Gravity, Urine 1.026 1.005 - 1.035    pH, Urine 7.0 5.0, 5.5, 6.0, 6.5, 7.0, 7.5, 8.0    Protein, Urine NEGATIVE NEGATIVE mg/dL    Glucose, Urine NEGATIVE NEGATIVE mg/dL    Blood, Urine NEGATIVE NEGATIVE    Ketones, Urine NEGATIVE NEGATIVE mg/dL    Bilirubin, Urine NEGATIVE NEGATIVE    Urobilinogen, Urine <2.0 <2.0 mg/dL    Nitrite, Urine NEGATIVE NEGATIVE    Leukocyte Esterase, Urine NEGATIVE NEGATIVE   CBC and Auto Differential   Result Value Ref Range    WBC 16.3 (H) 4.4 - 11.3 x10*3/uL    nRBC 0.2 (H) 0.0 - 0.0 /100 WBCs    RBC 3.64 (L) 4.00 - 5.20 x10*6/uL    Hemoglobin 10.9 (L) 12.0 - 16.0 g/dL    Hematocrit 33.2 (L) 36.0 - 46.0 %    MCV 91 80 - 100 fL    MCH 29.9 26.0 - 34.0 pg    MCHC 32.8 32.0 - 36.0 g/dL    RDW 14.0 11.5 - 14.5 %    Platelets 454 (H) 150 - 450 x10*3/uL    Immature Granulocytes %, Automated 10.0 (H) 0.0 - 0.9 %    Immature Granulocytes Absolute, Automated 1.63 (H) 0.00 - 0.70 x10*3/uL   Comprehensive Metabolic Panel   Result Value Ref Range    Glucose 99 74 - 99 mg/dL    Sodium 138 136 - 145 mmol/L    Potassium 3.7 3.5 - 5.3 mmol/L    Chloride 103 98 - 107 mmol/L    Bicarbonate 29 21 - 32 mmol/L    Anion Gap 10 10 - 20 mmol/L    Urea Nitrogen 7 6 - 23 mg/dL    Creatinine 0.64 0.50 - 1.05 mg/dL    eGFR >90 >60 mL/min/1.73m*2    Calcium 8.1 (L) 8.6 - 10.3 mg/dL    Albumin 2.8 (L) 3.4 - 5.0 g/dL    Alkaline Phosphatase 50 33 - 136 U/L    Total Protein 5.2 (L) 6.4 - 8.2 " g/dL    AST 21 9 - 39 U/L    Bilirubin, Total 0.5 0.0 - 1.2 mg/dL    ALT 16 7 - 45 U/L   Manual Differential   Result Value Ref Range    Neutrophils %, Manual 63.0 40.0 - 80.0 %    Bands %, Manual 1.0 0.0 - 5.0 %    Lymphocytes %, Manual 15.0 13.0 - 44.0 %    Monocytes %, Manual 6.0 2.0 - 10.0 %    Eosinophils %, Manual 4.0 0.0 - 6.0 %    Basophils %, Manual 0.0 0.0 - 2.0 %    Metamyelocytes %, Manual 11.0 0.0 - 0.0 %    Seg Neutrophils Absolute, Manual 10.27 (H) 1.20 - 7.00 x10*3/uL    Bands Absolute, Manual 0.16 0.00 - 0.70 x10*3/uL    Lymphocytes Absolute, Manual 2.45 1.20 - 4.80 x10*3/uL    Monocytes Absolute, Manual 0.98 0.10 - 1.00 x10*3/uL    Eosinophils Absolute, Manual 0.65 0.00 - 0.70 x10*3/uL    Basophils Absolute, Manual 0.00 0.00 - 0.10 x10*3/uL    Metamyelocytes Absolute, Manual 1.79 0.00 - 0.00 x10*3/uL    Total Cells Counted 100     Neutrophils Absolute, Manual 10.43 (H) 1.20 - 7.70 x10*3/uL    RBC Morphology See Below     Polychromasia Mild     Target Cells Few     Ovalocytes Few     [place] Cells Few           This patient has a central line   Reason for the central line remaining today? Parenteral medication                 Assessment/Plan   Principal Problem:    Cholecystitis  Active Problems:    Choledocholithiasis with acute cholecystitis    Gallstones      63F w/ h/o RYGB s/p lap subtotal cholecystectomy with cholangiogram and ERCP through gastric remnant for CHD stone admitted for further management.   She has uptrending leukocytosis without change in vitals or abdominal exam. UA, CXR and blood cultures are clean so far.   CT suspicious for developing abscess/biloma, drain in appropriate position. ID consulted: IV dapto and zosyn   -Keep drain in place   -Pain control - scheduled tylenol, oxy q4h, toradol. Lidocaine patch for back. Gabapentin. Melatonin at night.  -Repeat morning labs tomorrow   -Vitamins reordered  -LE duplex ordered  -Home health care initiated.  -SCDs, SQH, ambulation    Jenny  Raheel Bocanegra MD

## 2024-04-25 NOTE — CARE PLAN
Problem: Pain  Goal: My pain/discomfort is manageable  Outcome: Progressing     Problem: Safety  Goal: Patient will be injury free during hospitalization  Outcome: Progressing  Goal: I will remain free of falls  Outcome: Progressing     Problem: Daily Care  Goal: Daily care needs are met  Outcome: Progressing     Problem: Psychosocial Needs  Goal: Demonstrates ability to cope with hospitalization/illness  Outcome: Progressing  Goal: Collaborate with me, my family, and caregiver to identify my specific goals  Outcome: Progressing     Problem: Discharge Barriers  Goal: My discharge needs are met  Outcome: Progressing

## 2024-04-25 NOTE — CARE PLAN
The patient's goals for the shift include      The clinical goals for the shift include pain managaement and increased ambulation      Problem: Pain  Goal: My pain/discomfort is manageable  Outcome: Progressing  Flowsheets (Taken 4/25/2024 0854)  Resident's pain/discomfort is manageable: Offer non-pharmacological pain management interventions     Problem: Safety  Goal: Patient will be injury free during hospitalization  Outcome: Progressing  Goal: I will remain free of falls  Outcome: Progressing  Flowsheets (Taken 4/25/2024 0854)  Resident will remain free of falls: Apply bed/chair alarms as appropriate     Problem: Daily Care  Goal: Daily care needs are met  Outcome: Progressing  Flowsheets (Taken 4/25/2024 0854)  Daily care needs are met: Encourage independent activity per ability     Problem: Psychosocial Needs  Goal: Demonstrates ability to cope with hospitalization/illness  Outcome: Progressing  Flowsheets (Taken 4/25/2024 0854)  Demonstrates ability to cope with hospitalization/illness: Provide low-stimulation environment as needed  Goal: Collaborate with me, my family, and caregiver to identify my specific goals  Outcome: Progressing  Flowsheets (Taken 4/20/2024 1900 by Swetha Lennon RN)  Cultural Requests During Hospitalization: na  Spiritual Requests During Hospitalization: na     Problem: Discharge Barriers  Goal: My discharge needs are met  Outcome: Progressing  Flowsheets (Taken 4/25/2024 0854)  Resident's discharge needs are met: Involve resident/family/caregiver in discharge planning process

## 2024-04-26 ENCOUNTER — APPOINTMENT (OUTPATIENT)
Dept: RADIOLOGY | Facility: HOSPITAL | Age: 64
DRG: 417 | End: 2024-04-26
Payer: COMMERCIAL

## 2024-04-26 ENCOUNTER — DOCUMENTATION (OUTPATIENT)
Dept: PHARMACY | Facility: CLINIC | Age: 64
End: 2024-04-26

## 2024-04-26 ENCOUNTER — HOME INFUSION (OUTPATIENT)
Dept: INFUSION THERAPY | Age: 64
End: 2024-04-26
Payer: COMMERCIAL

## 2024-04-26 PROBLEM — Z90.49 S/P CHOLECYSTECTOMY: Status: ACTIVE | Noted: 2024-04-26

## 2024-04-26 PROBLEM — K81.9 CHOLECYSTITIS: Status: RESOLVED | Noted: 2024-02-09 | Resolved: 2024-04-26

## 2024-04-26 LAB
ALBUMIN SERPL BCP-MCNC: 3.1 G/DL (ref 3.4–5)
ALP SERPL-CCNC: 56 U/L (ref 33–136)
ALT SERPL W P-5'-P-CCNC: 16 U/L (ref 7–45)
ANION GAP SERPL CALC-SCNC: 13 MMOL/L (ref 10–20)
AST SERPL W P-5'-P-CCNC: 19 U/L (ref 9–39)
BACTERIA BLD CULT: NORMAL
BACTERIA BLD CULT: NORMAL
BACTERIA FLD CULT: ABNORMAL
BASOPHILS # BLD MANUAL: 0 X10*3/UL (ref 0–0.1)
BASOPHILS NFR BLD MANUAL: 0 %
BILIRUB SERPL-MCNC: 0.4 MG/DL (ref 0–1.2)
BUN SERPL-MCNC: 5 MG/DL (ref 6–23)
CALCIUM SERPL-MCNC: 8.6 MG/DL (ref 8.6–10.3)
CHLORIDE SERPL-SCNC: 103 MMOL/L (ref 98–107)
CO2 SERPL-SCNC: 28 MMOL/L (ref 21–32)
CREAT SERPL-MCNC: 0.64 MG/DL (ref 0.5–1.05)
EGFRCR SERPLBLD CKD-EPI 2021: >90 ML/MIN/1.73M*2
EOSINOPHIL # BLD MANUAL: 0.56 X10*3/UL (ref 0–0.7)
EOSINOPHIL NFR BLD MANUAL: 3 %
ERYTHROCYTE [DISTWIDTH] IN BLOOD BY AUTOMATED COUNT: 14.1 % (ref 11.5–14.5)
GLUCOSE SERPL-MCNC: 93 MG/DL (ref 74–99)
GRAM STN SPEC: ABNORMAL
GRAM STN SPEC: ABNORMAL
HCT VFR BLD AUTO: 34 % (ref 36–46)
HGB BLD-MCNC: 11.4 G/DL (ref 12–16)
IMM GRANULOCYTES # BLD AUTO: 1.71 X10*3/UL (ref 0–0.7)
IMM GRANULOCYTES NFR BLD AUTO: 9.1 % (ref 0–0.9)
LYMPHOCYTES # BLD MANUAL: 1.32 X10*3/UL (ref 1.2–4.8)
LYMPHOCYTES NFR BLD MANUAL: 7 %
MCH RBC QN AUTO: 30.6 PG (ref 26–34)
MCHC RBC AUTO-ENTMCNC: 33.5 G/DL (ref 32–36)
MCV RBC AUTO: 91 FL (ref 80–100)
METAMYELOCYTES # BLD MANUAL: 0.75 X10*3/UL
METAMYELOCYTES NFR BLD MANUAL: 4 %
MONOCYTES # BLD MANUAL: 1.13 X10*3/UL (ref 0.1–1)
MONOCYTES NFR BLD MANUAL: 6 %
NEUTROPHILS # BLD MANUAL: 15.04 X10*3/UL (ref 1.2–7.7)
NEUTS BAND # BLD MANUAL: 0.94 X10*3/UL (ref 0–0.7)
NEUTS BAND NFR BLD MANUAL: 5 %
NEUTS SEG # BLD MANUAL: 14.1 X10*3/UL (ref 1.2–7)
NEUTS SEG NFR BLD MANUAL: 75 %
NRBC BLD-RTO: 0.1 /100 WBCS (ref 0–0)
OVALOCYTES BLD QL SMEAR: ABNORMAL
PLATELET # BLD AUTO: 509 X10*3/UL (ref 150–450)
POLYCHROMASIA BLD QL SMEAR: ABNORMAL
POTASSIUM SERPL-SCNC: 3.9 MMOL/L (ref 3.5–5.3)
PROT SERPL-MCNC: 5.6 G/DL (ref 6.4–8.2)
RBC # BLD AUTO: 3.72 X10*6/UL (ref 4–5.2)
RBC MORPH BLD: ABNORMAL
SODIUM SERPL-SCNC: 140 MMOL/L (ref 136–145)
TARGETS BLD QL SMEAR: ABNORMAL
TOTAL CELLS COUNTED BLD: 100
WBC # BLD AUTO: 18.8 X10*3/UL (ref 4.4–11.3)

## 2024-04-26 PROCEDURE — 2500000004 HC RX 250 GENERAL PHARMACY W/ HCPCS (ALT 636 FOR OP/ED): Performed by: STUDENT IN AN ORGANIZED HEALTH CARE EDUCATION/TRAINING PROGRAM

## 2024-04-26 PROCEDURE — 85027 COMPLETE CBC AUTOMATED: CPT | Performed by: STUDENT IN AN ORGANIZED HEALTH CARE EDUCATION/TRAINING PROGRAM

## 2024-04-26 PROCEDURE — 2500000004 HC RX 250 GENERAL PHARMACY W/ HCPCS (ALT 636 FOR OP/ED): Performed by: INTERNAL MEDICINE

## 2024-04-26 PROCEDURE — 2500000005 HC RX 250 GENERAL PHARMACY W/O HCPCS: Performed by: STUDENT IN AN ORGANIZED HEALTH CARE EDUCATION/TRAINING PROGRAM

## 2024-04-26 PROCEDURE — 85007 BL SMEAR W/DIFF WBC COUNT: CPT | Performed by: STUDENT IN AN ORGANIZED HEALTH CARE EDUCATION/TRAINING PROGRAM

## 2024-04-26 PROCEDURE — 2500000001 HC RX 250 WO HCPCS SELF ADMINISTERED DRUGS (ALT 637 FOR MEDICARE OP): Performed by: STUDENT IN AN ORGANIZED HEALTH CARE EDUCATION/TRAINING PROGRAM

## 2024-04-26 PROCEDURE — 97116 GAIT TRAINING THERAPY: CPT | Mod: GP,CQ

## 2024-04-26 PROCEDURE — 87205 SMEAR GRAM STAIN: CPT | Mod: PARLAB | Performed by: INTERNAL MEDICINE

## 2024-04-26 PROCEDURE — 2500000006 HC RX 250 W HCPCS SELF ADMINISTERED DRUGS (ALT 637 FOR ALL PAYERS): Performed by: STUDENT IN AN ORGANIZED HEALTH CARE EDUCATION/TRAINING PROGRAM

## 2024-04-26 PROCEDURE — 74177 CT ABD & PELVIS W/CONTRAST: CPT

## 2024-04-26 PROCEDURE — 74177 CT ABD & PELVIS W/CONTRAST: CPT | Performed by: RADIOLOGY

## 2024-04-26 PROCEDURE — 2550000001 HC RX 255 CONTRASTS: Performed by: INTERNAL MEDICINE

## 2024-04-26 PROCEDURE — 80053 COMPREHEN METABOLIC PANEL: CPT | Performed by: STUDENT IN AN ORGANIZED HEALTH CARE EDUCATION/TRAINING PROGRAM

## 2024-04-26 PROCEDURE — 87075 CULTR BACTERIA EXCEPT BLOOD: CPT | Mod: PARLAB | Performed by: INTERNAL MEDICINE

## 2024-04-26 PROCEDURE — 2500000001 HC RX 250 WO HCPCS SELF ADMINISTERED DRUGS (ALT 637 FOR MEDICARE OP): Performed by: INTERNAL MEDICINE

## 2024-04-26 PROCEDURE — 87186 SC STD MICRODIL/AGAR DIL: CPT | Mod: PARLAB | Performed by: INTERNAL MEDICINE

## 2024-04-26 PROCEDURE — 1100000001 HC PRIVATE ROOM DAILY

## 2024-04-26 PROCEDURE — 84075 ASSAY ALKALINE PHOSPHATASE: CPT | Performed by: STUDENT IN AN ORGANIZED HEALTH CARE EDUCATION/TRAINING PROGRAM

## 2024-04-26 RX ORDER — FLUCONAZOLE 100 MG/1
400 TABLET ORAL ONCE
Status: COMPLETED | OUTPATIENT
Start: 2024-04-26 | End: 2024-04-26

## 2024-04-26 RX ORDER — PIPERACILLIN SODIUM, TAZOBACTAM SODIUM 3; .375 G/15ML; G/15ML
3.38 INJECTION, POWDER, LYOPHILIZED, FOR SOLUTION INTRAVENOUS EVERY 6 HOURS
Qty: 378 G | Refills: 0 | Status: SHIPPED
Start: 2024-04-26 | End: 2024-05-24

## 2024-04-26 RX ORDER — FLUCONAZOLE 100 MG/1
200 TABLET ORAL DAILY
Status: DISCONTINUED | OUTPATIENT
Start: 2024-04-27 | End: 2024-05-02 | Stop reason: HOSPADM

## 2024-04-26 RX ORDER — DAPTOMYCIN 50 MG/ML
450 INJECTION, POWDER, LYOPHILIZED, FOR SOLUTION INTRAVENOUS DAILY
Qty: 12600 MG | Refills: 0 | Status: SHIPPED
Start: 2024-04-26 | End: 2024-05-24

## 2024-04-26 RX ADMIN — OXYCODONE HYDROCHLORIDE 5 MG: 5 SOLUTION ORAL at 00:48

## 2024-04-26 RX ADMIN — IOHEXOL 75 ML: 350 INJECTION, SOLUTION INTRAVENOUS at 17:03

## 2024-04-26 RX ADMIN — HEPARIN SODIUM 5000 UNITS: 5000 INJECTION INTRAVENOUS; SUBCUTANEOUS at 21:52

## 2024-04-26 RX ADMIN — ACETAMINOPHEN 975 MG: 325 TABLET ORAL at 14:19

## 2024-04-26 RX ADMIN — HEPARIN SODIUM 5000 UNITS: 5000 INJECTION INTRAVENOUS; SUBCUTANEOUS at 14:20

## 2024-04-26 RX ADMIN — PIPERACILLIN SODIUM AND TAZOBACTAM SODIUM 3.38 G: 3; .375 INJECTION, SOLUTION INTRAVENOUS at 15:27

## 2024-04-26 RX ADMIN — CHOLECALCIFEROL TAB 25 MCG (1000 UNIT) 1000 UNITS: 25 TAB at 08:50

## 2024-04-26 RX ADMIN — TRAZODONE HYDROCHLORIDE 100 MG: 50 TABLET ORAL at 21:52

## 2024-04-26 RX ADMIN — GABAPENTIN 200 MG: 100 CAPSULE ORAL at 06:17

## 2024-04-26 RX ADMIN — DAPTOMYCIN 450 MG: 500 INJECTION, POWDER, LYOPHILIZED, FOR SOLUTION INTRAVENOUS at 08:54

## 2024-04-26 RX ADMIN — SERTRALINE HYDROCHLORIDE 100 MG: 100 TABLET ORAL at 08:50

## 2024-04-26 RX ADMIN — HEPARIN SODIUM 5000 UNITS: 5000 INJECTION INTRAVENOUS; SUBCUTANEOUS at 06:17

## 2024-04-26 RX ADMIN — ACETAMINOPHEN 975 MG: 325 TABLET ORAL at 21:52

## 2024-04-26 RX ADMIN — PANTOPRAZOLE SODIUM 40 MG: 40 TABLET, DELAYED RELEASE ORAL at 06:17

## 2024-04-26 RX ADMIN — Medication 5 MG: at 21:52

## 2024-04-26 RX ADMIN — Medication 25 MG: at 08:50

## 2024-04-26 RX ADMIN — FLUCONAZOLE 400 MG: 100 TABLET ORAL at 12:34

## 2024-04-26 RX ADMIN — LEVOTHYROXINE SODIUM 50 MCG: 50 TABLET ORAL at 08:50

## 2024-04-26 RX ADMIN — PIPERACILLIN SODIUM AND TAZOBACTAM SODIUM 3.38 G: 3; .375 INJECTION, SOLUTION INTRAVENOUS at 21:52

## 2024-04-26 RX ADMIN — ACETAMINOPHEN 975 MG: 325 TABLET ORAL at 08:50

## 2024-04-26 RX ADMIN — PIPERACILLIN SODIUM AND TAZOBACTAM SODIUM 3.38 G: 3; .375 INJECTION, SOLUTION INTRAVENOUS at 03:49

## 2024-04-26 RX ADMIN — VITAM B12 100 MCG: 100 TAB at 08:50

## 2024-04-26 RX ADMIN — ACETAMINOPHEN 975 MG: 325 TABLET ORAL at 03:49

## 2024-04-26 RX ADMIN — BUPROPION HYDROCHLORIDE 300 MG: 150 TABLET, EXTENDED RELEASE ORAL at 08:51

## 2024-04-26 RX ADMIN — BUPROPION HYDROCHLORIDE 150 MG: 150 TABLET, FILM COATED, EXTENDED RELEASE ORAL at 08:49

## 2024-04-26 RX ADMIN — GABAPENTIN 200 MG: 100 CAPSULE ORAL at 21:52

## 2024-04-26 RX ADMIN — GABAPENTIN 200 MG: 100 CAPSULE ORAL at 14:20

## 2024-04-26 RX ADMIN — PIPERACILLIN SODIUM AND TAZOBACTAM SODIUM 3.38 G: 3; .375 INJECTION, SOLUTION INTRAVENOUS at 08:51

## 2024-04-26 ASSESSMENT — COGNITIVE AND FUNCTIONAL STATUS - GENERAL
MOBILITY SCORE: 21
MOBILITY SCORE: 21
DAILY ACTIVITIY SCORE: 24
WALKING IN HOSPITAL ROOM: A LITTLE
WALKING IN HOSPITAL ROOM: A LITTLE
DAILY ACTIVITIY SCORE: 24
MOVING TO AND FROM BED TO CHAIR: A LITTLE
MOBILITY SCORE: 24
CLIMB 3 TO 5 STEPS WITH RAILING: A LITTLE
MOVING TO AND FROM BED TO CHAIR: A LITTLE
CLIMB 3 TO 5 STEPS WITH RAILING: A LITTLE

## 2024-04-26 ASSESSMENT — PAIN SCALES - GENERAL
PAINLEVEL_OUTOF10: 7
PAINLEVEL_OUTOF10: 2
PAINLEVEL_OUTOF10: 0 - NO PAIN
PAINLEVEL_OUTOF10: 3
PAINLEVEL_OUTOF10: 3

## 2024-04-26 ASSESSMENT — PAIN - FUNCTIONAL ASSESSMENT
PAIN_FUNCTIONAL_ASSESSMENT: 0-10

## 2024-04-26 NOTE — PROGRESS NOTES
GENERAL SURGERY CLINIC  FOLLOW UP NOTE      Name: Mandy Ramirez  MRN: [id number]      Index Surgery  Date of Surgery: 4/17/2024   Surgeon: Dr. Bedoya    Surgical Procedure: LAPAROSCOPIC CHOLECYSTECTOMY WITH OPERATIVE CHOLANGIOGRAMS & C-ARM/ GASTROSTOMY FOR ERCP/CLOSURE OF GASTROSTOMY/CHOLEDUCOSCOPE     HPI: 63 year old female presenting today for a post hospital discharge follow up.  Patient is s/p LAPAROSCOPIC CHOLECYSTECTOMY WITH OPERATIVE CHOLANGIOGRAMS & C-ARM/ GASTROSTOMY FOR ERCP/CLOSURE OF GASTROSTOMY/CHOLEDUCOSCOPE on 4/17/24.  Patient has an extended stay secondary to development of post operative infection. Dr. Dunn from ID managing PICC line and antibiotics.     Concerns related to:  Nausea/Vomiting, Reflux: Intermittent nausea  Abdominal Pain: Denies  Diarrhea/Constipation Diarrhea        REVIEW OF SYSTEMS:  CONSTITUTIONAL: Patient denies fevers, chills, sweats and weight changes.  CARDIOVASCULAR: Patient denies chest pains, palpitations, orthopnea and paroxysmal nocturnal dyspnea.  RESPIRATORY: No dyspnea on exertion, no wheezing or cough.  GI: No nausea, vomiting, diarrhea, constipation, abdominal pain, hematochezia or melena.  MUSCULOSKELETAL: No myalgias or arthralgias.  NEUROLOGIC: No chronic headaches, no seizures. Patient denies numbness, tingling or weakness.  PSYCHIATRIC: Patient denies problems with mood disturbance. No problems with anxiety.  ENDOCRINE: No excessive urination or excessive thirst.  DERMATOLOGIC: Patient denies any rashes or skin changes.    PHYSICAL EXAM:  There were no vitals taken for this visit.  Alert, well appearing, no acute distress, nourished, hydrated.  Anicteric sclera, no ptosis  Facial symmetry  Neck supple  Unlabored respirations.  Easily conversant without increased respiratory effort  Oriented to person, place, time.  Judgement intact.  Appropriate mood, affect.       ASSESSMENT & PLAN:  63 y.o. female presenting for follow up visit s/p cholecystectomy  4/17/2024.    No acute post surgery complications  No acute issues or concerns presented today.  Tolerating diet   Bowel regularity ***    Plan:

## 2024-04-26 NOTE — PROGRESS NOTES
04/26/24 1033   Discharge Planning   Living Arrangements Spouse/significant other   Support Systems Spouse/significant other   Assistance Needed HHC for IV ATB   Type of Residence Private residence   Home or Post Acute Services In home services   [facility]     TCC Note: Pt will not be discharged home today per MD. Most likely will be tomorrow. Will follow. Nandini Hernandez, MSN, RN, TCC.

## 2024-04-26 NOTE — H&P
"Infectious disease progress note  Subjective   Abdominal abscess  Leukocytosis  Recent cholecystectomy       Antibiotics  Daptomycin day 4 out of 28 days  Zosyn day 4 out of 28 days    Objective   Range of Vitals (last 24 hours)  Heart Rate:  [82-91]   Temp:  [36 °C (96.8 °F)-36.3 °C (97.3 °F)]   Resp:  [18]   BP: (107-137)/(64-81)   SpO2:  [94 %-95 %]   Daily Weight  04/17/24 : 97.1 kg (214 lb)    Body mass index is 32.54 kg/m².      Physical Exam  Patient sitting in chair very anxious wants to go home.  Had a long discussion regarding her antibiotics and elevated white count patient is agreeable though desperately wanting to go home but will comply with staying and getting a CAT scan and repeat CBC done    HEENT PERRLA  Chest fair air entry bilaterally  CVS S1-S2 regular  Abdomen soft though where the drain is there is hardness and redness noted  Extremities plus pulse bilaterally no pitting edema      Relevant Results  Labs  Lab Results   Component Value Date    WBC 18.8 (H) 04/26/2024    HGB 11.4 (L) 04/26/2024    HCT 34.0 (L) 04/26/2024    MCV 91 04/26/2024     (H) 04/26/2024     Lab Results   Component Value Date    GLUCOSE 93 04/26/2024    CALCIUM 8.6 04/26/2024     04/26/2024    K 3.9 04/26/2024    CO2 28 04/26/2024     04/26/2024    BUN 5 (L) 04/26/2024    CREATININE 0.64 04/26/2024   ESR: --No results found for: \"SEDRATE\"No results found for: \"CRP\"  Lab Results   Component Value Date    ALT 16 04/26/2024    AST 19 04/26/2024    ALKPHOS 56 04/26/2024    BILITOT 0.4 04/26/2024       Microbiology  4- blood cultures pending negative to date  4- fluid cultures in progress    Imaging  4- CT of abdomen shows  The pneumoperitoneum along with collection with several air locules at the right upper abdomen extending inferiorly along the anterior pararenal fascia  4- chest x-ray development of atelectasis     Assessment/Plan   1.  We will continue with daptomycin and " Zosyn I will place and correct the orders for home care since they are incorrect.  2.  Will add in Diflucan 200 mg p.o. daily  3.  Will obtain a fluid culture from the GABRIEL drain since the other cultures are pending and are negative so far  4.  We will get a stat CT see if further drainage needs to be done on the abdomen  5.  Will repeat a CBC and if white count trends down patient can go home on oral Diflucan, daptomycin and Zosyn.  All orders will be placed and faxed to home health care  6.  Patient to follow-up with me at my office in 2 to 3 weeks        Other issues  Thelma-en-Y bypass chronic for patient  Cholecystectomy as above  Hiatal hernia repair    I reviewed and interpreted all lab test imaging studies and documentations from other healthcare providers  I am monitoring for antibiotic side effects and toxicity     Maya Dunn MD

## 2024-04-26 NOTE — PROGRESS NOTES
Physical Therapy    Physical Therapy Treatment    Patient Name: Mandy Ramirez  MRN: [id number]  Today's Date: 4/26/2024  Time Calculation  Start Time: 0815  Stop Time: 0838  Time Calculation (min): 23 min       Assessment/Plan         PT Plan  PT Frequency: 2 times per week (1-2 visits)  PT Discharge Recommendations: Low intensity level of continued care  PT - OK to Discharge: Yes      General Visit Information:   PT  Visit  PT Received On: 04/26/24  General  Prior to Session Communication: Bedside nurse  Patient Position Received: Up in chair, Alarm off, not on at start of session  General Comment:  (Pt pleasant and willing to participate in therapy session.  Seated in chair, no alarm, call bell in reach post session.)    Subjective   Precautions:  Precautions  Post-Surgical Precautions: Abdominal surgery precautions  Vital Signs:       Objective   Pain:  Pain Assessment  Pain Assessment: 0-10  Pain Score: 3  Pain Location:  (low back)  Pain Interventions:  (Pt stated receiving pain meds prior to therapy session.)           Treatments:  Ambulation/Gait Training  Ambulation/Gait Training Performed: Yes (Pt amb 125' and 150'x2 with reciprocal gait pattern, no AD and Sup.  No dizziness or LOB noted.)  Transfers  Transfer: Yes (Pt performed sit<>stand with no AD and Indep.)    Stairs  Stairs: Yes (Pt ascend/descend 4 stairs x2 with reciprocal gait pattern, Peterson HR and Sup.)    Outcome Measures:  [facility] Basic Mobility  Turning from your back to your side while in a flat bed without using bedrails: None  Moving from lying on your back to sitting on the side of a flat bed without using bedrails: None  Moving to and from bed to chair (including a wheelchair): A little  Standing up from a chair using your arms (e.g. wheelchair or bedside chair): None  To walk in hospital room: A little  Climbing 3-5 steps with railing: A little  Basic Mobility - Total Score: 21    Education Documentation  Mobility Training, taught by Dale DUNAWAY  THIERRY Fountain at 4/26/2024  8:48 AM.  Learner: Patient  Readiness: Acceptance  Method: Explanation  Response: Verbalizes Understanding    Education Comments  No comments found.        OP EDUCATION:       Encounter Problems       Encounter Problems (Active)       PT Problem       ambulation (Progressing)       Start:  04/24/24    Expected End:  05/01/24       Ambulate ind 150 ft> w/o a device or approp device if needed         stairs (Progressing)       Start:  04/24/24    Expected End:  05/01/24       Ind 4 stairs/rail

## 2024-04-26 NOTE — CARE PLAN
Problem: Pain  Goal: My pain/discomfort is manageable  4/26/2024 0723 by Ethel Sweeney RN  Outcome: Progressing  4/26/2024 0723 by Ethel Sweeney RN  Outcome: Progressing  4/26/2024 0723 by Ethel Sweeney RN  Outcome: Progressing  4/26/2024 0723 by Ethel Sweeney RN  Outcome: Progressing     Problem: Safety  Goal: Patient will be injury free during hospitalization  4/26/2024 0723 by Ethel Sweeney RN  Outcome: Progressing  4/26/2024 0723 by Ethel Sweeney RN  Outcome: Progressing  4/26/2024 0723 by Ethel Sweeney RN  Outcome: Progressing  4/26/2024 0723 by Ethel Sweeney RN  Outcome: Progressing  Goal: I will remain free of falls  4/26/2024 0723 by Ethel Sweeney RN  Outcome: Progressing  4/26/2024 0723 by Ethel Sweeney RN  Outcome: Progressing  4/26/2024 0723 by Ethel Sweeney RN  Outcome: Progressing  4/26/2024 0723 by Ethel Sweeney RN  Outcome: Progressing     Problem: Daily Care  Goal: Daily care needs are met  4/26/2024 0723 by Ethel Sweeney RN  Outcome: Progressing  4/26/2024 0723 by Ethel Sweeney RN  Outcome: Progressing  4/26/2024 0723 by Ethel Sweeney RN  Outcome: Progressing  4/26/2024 0723 by Ethel Sweeney RN  Outcome: Progressing     Problem: Psychosocial Needs  Goal: Demonstrates ability to cope with hospitalization/illness  4/26/2024 0723 by Ethel Sweeney RN  Outcome: Progressing  4/26/2024 0723 by Ethel Sweeney RN  Outcome: Progressing  4/26/2024 0723 by Ethel Sweeney RN  Outcome: Progressing  4/26/2024 0723 by Ethel Sweeney RN  Outcome: Progressing  Goal: Collaborate with me, my family, and caregiver to identify my specific goals  4/26/2024 0723 by Ethel Sweeney RN  Outcome: Progressing  4/26/2024 0723 by Ethel Sweeney RN  Outcome: Progressing  4/26/2024 0723 by Ethel Sweeney RN  Outcome: Progressing  4/26/2024 0723 by Ethel Sweeney RN  Outcome: Progressing     Problem: Discharge Barriers  Goal: My discharge needs are met  4/26/2024 0723 by Ethel Sweeney RN  Outcome:  Progressing  4/26/2024 0723 by Ethel Sweeney RN  Outcome: Progressing  4/26/2024 0723 by Ethel Sweeney RN  Outcome: Progressing  4/26/2024 0723 by Ethel Sweeney RN  Outcome: Progressing

## 2024-04-26 NOTE — PROGRESS NOTES
DIAGNOSIS abdominal abscess  Allergies   Allergen Reactions    Egg Derived GI Upset and Nausea/vomiting     PATIENT STATES HIGH SENSITIVITY TO ALL EGG PRODUCTS AND ITEMS THAT INCLUDE INGREDIENT EGG    Egg GI Upset, Nausea/vomiting, GI intolerance and Unknown     Gastris pain      REVIEW OF LABS AT DISCHARGE  LINE INFO: PT HAS A SL PICC TO BE MANAGED PER [facility] PROTOCOL  PT ORDERED dapto 450 mg q24 and Zosyn 3.375 gm q6 THRU 5/24/24  LABS ORDERED INCLUDE CBC, CK, BMP  SYSTEM IV push (dapto), CADD pump (Zosyn)  CARE PLAN DONE    Mandyse Ramirez IS A 63 y.o. female ORDERED daptomycin and Zosyn  FOLLOWED BY Dr Dunn    [facility] spoke with pt, informed her of medication name, delivery, dosing, etc. She verbalized understanding of instruction regarding medication and receipt/storage of package. Verified delivery address as    [address] [place] [address] [place] [address]    to call on way,  should be home to accept package.    RX DISPENSED THE FOLLOWING WITH SUPPLIES TO MATCH WITH DELIVERY 4/26:  5x dapto syringe  5x Zosyn 24-hr bags  DOS 4/27-5/1 depending on SOC    FOLLOW UP 4/30 PROGRESS, LABS, INV, DELIVERY STRAIGHT vs OVN

## 2024-04-27 LAB
ALBUMIN SERPL BCP-MCNC: 3.1 G/DL (ref 3.4–5)
ALP SERPL-CCNC: 62 U/L (ref 33–136)
ALT SERPL W P-5'-P-CCNC: 15 U/L (ref 7–45)
ANION GAP SERPL CALC-SCNC: 11 MMOL/L (ref 10–20)
AST SERPL W P-5'-P-CCNC: 20 U/L (ref 9–39)
BASOPHILS # BLD MANUAL: 0.19 X10*3/UL (ref 0–0.1)
BASOPHILS NFR BLD MANUAL: 1 %
BILIRUB SERPL-MCNC: 0.4 MG/DL (ref 0–1.2)
BUN SERPL-MCNC: 6 MG/DL (ref 6–23)
CALCIUM SERPL-MCNC: 8.6 MG/DL (ref 8.6–10.3)
CHLORIDE SERPL-SCNC: 104 MMOL/L (ref 98–107)
CO2 SERPL-SCNC: 27 MMOL/L (ref 21–32)
CREAT SERPL-MCNC: 0.63 MG/DL (ref 0.5–1.05)
EGFRCR SERPLBLD CKD-EPI 2021: >90 ML/MIN/1.73M*2
EOSINOPHIL # BLD MANUAL: 0.77 X10*3/UL (ref 0–0.7)
EOSINOPHIL NFR BLD MANUAL: 4 %
ERYTHROCYTE [DISTWIDTH] IN BLOOD BY AUTOMATED COUNT: 14.2 % (ref 11.5–14.5)
GLUCOSE SERPL-MCNC: 92 MG/DL (ref 74–99)
HCT VFR BLD AUTO: 34.8 % (ref 36–46)
HGB BLD-MCNC: 11.6 G/DL (ref 12–16)
IMM GRANULOCYTES # BLD AUTO: 1.66 X10*3/UL (ref 0–0.7)
IMM GRANULOCYTES NFR BLD AUTO: 8.6 % (ref 0–0.9)
LYMPHOCYTES # BLD MANUAL: 2.69 X10*3/UL (ref 1.2–4.8)
LYMPHOCYTES NFR BLD MANUAL: 14 %
MCH RBC QN AUTO: 30.5 PG (ref 26–34)
MCHC RBC AUTO-ENTMCNC: 33.3 G/DL (ref 32–36)
MCV RBC AUTO: 92 FL (ref 80–100)
METAMYELOCYTES # BLD MANUAL: 0.77 X10*3/UL
METAMYELOCYTES NFR BLD MANUAL: 4 %
MONOCYTES # BLD MANUAL: 1.34 X10*3/UL (ref 0.1–1)
MONOCYTES NFR BLD MANUAL: 7 %
NEUTS SEG # BLD MANUAL: 13.44 X10*3/UL (ref 1.2–7)
NEUTS SEG NFR BLD MANUAL: 70 %
NRBC BLD-RTO: 0 /100 WBCS (ref 0–0)
OVALOCYTES BLD QL SMEAR: ABNORMAL
PLATELET # BLD AUTO: 578 X10*3/UL (ref 150–450)
POLYCHROMASIA BLD QL SMEAR: ABNORMAL
POTASSIUM SERPL-SCNC: 3.8 MMOL/L (ref 3.5–5.3)
PROT SERPL-MCNC: 5.9 G/DL (ref 6.4–8.2)
RBC # BLD AUTO: 3.8 X10*6/UL (ref 4–5.2)
RBC MORPH BLD: ABNORMAL
SODIUM SERPL-SCNC: 138 MMOL/L (ref 136–145)
TOTAL CELLS COUNTED BLD: 100
WBC # BLD AUTO: 19.2 X10*3/UL (ref 4.4–11.3)

## 2024-04-27 PROCEDURE — 85027 COMPLETE CBC AUTOMATED: CPT | Performed by: STUDENT IN AN ORGANIZED HEALTH CARE EDUCATION/TRAINING PROGRAM

## 2024-04-27 PROCEDURE — 2500000001 HC RX 250 WO HCPCS SELF ADMINISTERED DRUGS (ALT 637 FOR MEDICARE OP): Performed by: STUDENT IN AN ORGANIZED HEALTH CARE EDUCATION/TRAINING PROGRAM

## 2024-04-27 PROCEDURE — 2500000006 HC RX 250 W HCPCS SELF ADMINISTERED DRUGS (ALT 637 FOR ALL PAYERS): Performed by: STUDENT IN AN ORGANIZED HEALTH CARE EDUCATION/TRAINING PROGRAM

## 2024-04-27 PROCEDURE — 2500000004 HC RX 250 GENERAL PHARMACY W/ HCPCS (ALT 636 FOR OP/ED): Performed by: STUDENT IN AN ORGANIZED HEALTH CARE EDUCATION/TRAINING PROGRAM

## 2024-04-27 PROCEDURE — 2500000004 HC RX 250 GENERAL PHARMACY W/ HCPCS (ALT 636 FOR OP/ED): Performed by: INTERNAL MEDICINE

## 2024-04-27 PROCEDURE — 80053 COMPREHEN METABOLIC PANEL: CPT | Performed by: STUDENT IN AN ORGANIZED HEALTH CARE EDUCATION/TRAINING PROGRAM

## 2024-04-27 PROCEDURE — 1100000001 HC PRIVATE ROOM DAILY

## 2024-04-27 PROCEDURE — 2500000001 HC RX 250 WO HCPCS SELF ADMINISTERED DRUGS (ALT 637 FOR MEDICARE OP): Performed by: INTERNAL MEDICINE

## 2024-04-27 PROCEDURE — 2500000005 HC RX 250 GENERAL PHARMACY W/O HCPCS: Performed by: STUDENT IN AN ORGANIZED HEALTH CARE EDUCATION/TRAINING PROGRAM

## 2024-04-27 PROCEDURE — 85007 BL SMEAR W/DIFF WBC COUNT: CPT | Performed by: STUDENT IN AN ORGANIZED HEALTH CARE EDUCATION/TRAINING PROGRAM

## 2024-04-27 RX ORDER — LIDOCAINE HYDROCHLORIDE AND EPINEPHRINE 10; 10 MG/ML; UG/ML
20 INJECTION, SOLUTION INFILTRATION; PERINEURAL ONCE
Status: COMPLETED | OUTPATIENT
Start: 2024-04-27 | End: 2024-04-27

## 2024-04-27 RX ADMIN — HEPARIN SODIUM 5000 UNITS: 5000 INJECTION INTRAVENOUS; SUBCUTANEOUS at 21:03

## 2024-04-27 RX ADMIN — PANTOPRAZOLE SODIUM 40 MG: 40 TABLET, DELAYED RELEASE ORAL at 06:28

## 2024-04-27 RX ADMIN — GABAPENTIN 200 MG: 100 CAPSULE ORAL at 06:28

## 2024-04-27 RX ADMIN — PIPERACILLIN SODIUM AND TAZOBACTAM SODIUM 3.38 G: 3; .375 INJECTION, SOLUTION INTRAVENOUS at 14:15

## 2024-04-27 RX ADMIN — GABAPENTIN 200 MG: 100 CAPSULE ORAL at 13:58

## 2024-04-27 RX ADMIN — DAPTOMYCIN 450 MG: 500 INJECTION, POWDER, LYOPHILIZED, FOR SOLUTION INTRAVENOUS at 16:59

## 2024-04-27 RX ADMIN — LIDOCAINE 4% 1 PATCH: 40 PATCH TOPICAL at 08:15

## 2024-04-27 RX ADMIN — SERTRALINE HYDROCHLORIDE 100 MG: 100 TABLET ORAL at 08:16

## 2024-04-27 RX ADMIN — BUPROPION HYDROCHLORIDE 150 MG: 150 TABLET, FILM COATED, EXTENDED RELEASE ORAL at 08:15

## 2024-04-27 RX ADMIN — OXYCODONE HYDROCHLORIDE 5 MG: 5 SOLUTION ORAL at 22:42

## 2024-04-27 RX ADMIN — ACETAMINOPHEN 975 MG: 325 TABLET ORAL at 13:58

## 2024-04-27 RX ADMIN — PIPERACILLIN SODIUM AND TAZOBACTAM SODIUM 3.38 G: 3; .375 INJECTION, SOLUTION INTRAVENOUS at 02:49

## 2024-04-27 RX ADMIN — PIPERACILLIN SODIUM AND TAZOBACTAM SODIUM 3.38 G: 3; .375 INJECTION, SOLUTION INTRAVENOUS at 08:14

## 2024-04-27 RX ADMIN — LIDOCAINE HYDROCHLORIDE,EPINEPHRINE BITARTRATE 20 ML: 10; .01 INJECTION, SOLUTION INFILTRATION; PERINEURAL at 09:45

## 2024-04-27 RX ADMIN — ACETAMINOPHEN 975 MG: 325 TABLET ORAL at 02:49

## 2024-04-27 RX ADMIN — ACETAMINOPHEN 975 MG: 325 TABLET ORAL at 21:03

## 2024-04-27 RX ADMIN — HEPARIN SODIUM 5000 UNITS: 5000 INJECTION INTRAVENOUS; SUBCUTANEOUS at 14:00

## 2024-04-27 RX ADMIN — FLUCONAZOLE 200 MG: 100 TABLET ORAL at 08:19

## 2024-04-27 RX ADMIN — BUPROPION HYDROCHLORIDE 300 MG: 150 TABLET, EXTENDED RELEASE ORAL at 08:15

## 2024-04-27 RX ADMIN — OXYCODONE HYDROCHLORIDE 5 MG: 5 SOLUTION ORAL at 10:52

## 2024-04-27 RX ADMIN — Medication 25 MG: at 08:18

## 2024-04-27 RX ADMIN — HEPARIN SODIUM 5000 UNITS: 5000 INJECTION INTRAVENOUS; SUBCUTANEOUS at 06:28

## 2024-04-27 RX ADMIN — GABAPENTIN 200 MG: 100 CAPSULE ORAL at 21:03

## 2024-04-27 RX ADMIN — Medication 5 MG: at 22:41

## 2024-04-27 RX ADMIN — PIPERACILLIN SODIUM AND TAZOBACTAM SODIUM 3.38 G: 3; .375 INJECTION, SOLUTION INTRAVENOUS at 21:03

## 2024-04-27 RX ADMIN — CHOLECALCIFEROL TAB 25 MCG (1000 UNIT) 1000 UNITS: 25 TAB at 09:00

## 2024-04-27 RX ADMIN — TRAZODONE HYDROCHLORIDE 100 MG: 50 TABLET ORAL at 22:41

## 2024-04-27 RX ADMIN — LEVOTHYROXINE SODIUM 50 MCG: 50 TABLET ORAL at 08:16

## 2024-04-27 RX ADMIN — VITAM B12 100 MCG: 100 TAB at 08:18

## 2024-04-27 ASSESSMENT — COGNITIVE AND FUNCTIONAL STATUS - GENERAL
MOBILITY SCORE: 24
DAILY ACTIVITIY SCORE: 24

## 2024-04-27 ASSESSMENT — PAIN - FUNCTIONAL ASSESSMENT
PAIN_FUNCTIONAL_ASSESSMENT: 0-10

## 2024-04-27 ASSESSMENT — PAIN SCALES - GENERAL
PAINLEVEL_OUTOF10: 6
PAINLEVEL_OUTOF10: 4
PAINLEVEL_OUTOF10: 0 - NO PAIN
PAINLEVEL_OUTOF10: 4

## 2024-04-27 NOTE — PROGRESS NOTES
"Mandy Ramirez is a 63 y.o. female on day 9 of admission presenting with Cholecystitis.    Subjective   Patient had a leukocytosis to ~18. She has otherwise no fever/chills. Pain is overall controlled. She is tolerating her diet without issues.        Objective     Physical Exam  Vitals reviewed.   Constitutional:       Appearance: Normal appearance. She is obese.   HENT:      Head: Normocephalic and atraumatic.      Nose: Nose normal.      Mouth/Throat:      Mouth: Mucous membranes are moist.   Eyes:      Extraocular Movements: Extraocular movements intact.      Pupils: Pupils are equal, round, and reactive to light.   Cardiovascular:      Rate and Rhythm: Normal rate and regular rhythm.   Pulmonary:      Effort: Pulmonary effort is normal.   Abdominal:      Palpations: Abdomen is soft.      Tenderness: There is abdominal tenderness (mild incisional).      Comments: Incisions c/d/i   Musculoskeletal:         General: Normal range of motion.      Cervical back: Normal range of motion and neck supple. No edema lower extremity  Skin:     General: Skin is warm and dry.      Capillary Refill: Capillary refill takes less than 2 seconds.   Neurological:      General: No focal deficit present.      Mental Status: She is alert.   Psychiatric:         Mood and Affect: Mood normal.         Behavior: Behavior normal.         Thought Content: Thought content normal.         Judgment: Judgment normal.      Drain: 10cc/24h cloudy output  Developing induration and cellulitis at the drain site. No crepitus. No fluctuation. Improving - marked site yesterday with redness resolving. Minimal purulence at the drain site.    Last Recorded Vitals  Blood pressure 129/74, pulse 85, temperature 36 °C (96.8 °F), temperature source Temporal, resp. rate 16, height 1.727 m (5' 8\"), weight 97.1 kg (214 lb), SpO2 97%.  Intake/Output last 3 Shifts:  I/O last 3 completed shifts:  In: 418 (4.3 mL/kg) [IV Piggyback:418]  Out: 970 (10 mL/kg) " [Urine:950 (0.3 mL/kg/hr); Drains:20]  Weight: 97.1 kg     Relevant Results                Results for orders placed or performed during the hospital encounter of 04/17/24 (from the past 24 hour(s))   CBC and Auto Differential   Result Value Ref Range    WBC 18.8 (H) 4.4 - 11.3 x10*3/uL    nRBC 0.1 (H) 0.0 - 0.0 /100 WBCs    RBC 3.72 (L) 4.00 - 5.20 x10*6/uL    Hemoglobin 11.4 (L) 12.0 - 16.0 g/dL    Hematocrit 34.0 (L) 36.0 - 46.0 %    MCV 91 80 - 100 fL    MCH 30.6 26.0 - 34.0 pg    MCHC 33.5 32.0 - 36.0 g/dL    RDW 14.1 11.5 - 14.5 %    Platelets 509 (H) 150 - 450 x10*3/uL    Immature Granulocytes %, Automated 9.1 (H) 0.0 - 0.9 %    Immature Granulocytes Absolute, Automated 1.71 (H) 0.00 - 0.70 x10*3/uL   Comprehensive Metabolic Panel   Result Value Ref Range    Glucose 93 74 - 99 mg/dL    Sodium 140 136 - 145 mmol/L    Potassium 3.9 3.5 - 5.3 mmol/L    Chloride 103 98 - 107 mmol/L    Bicarbonate 28 21 - 32 mmol/L    Anion Gap 13 10 - 20 mmol/L    Urea Nitrogen 5 (L) 6 - 23 mg/dL    Creatinine 0.64 0.50 - 1.05 mg/dL    eGFR >90 >60 mL/min/1.73m*2    Calcium 8.6 8.6 - 10.3 mg/dL    Albumin 3.1 (L) 3.4 - 5.0 g/dL    Alkaline Phosphatase 56 33 - 136 U/L    Total Protein 5.6 (L) 6.4 - 8.2 g/dL    AST 19 9 - 39 U/L    Bilirubin, Total 0.4 0.0 - 1.2 mg/dL    ALT 16 7 - 45 U/L   Manual Differential   Result Value Ref Range    Neutrophils %, Manual 75.0 40.0 - 80.0 %    Bands %, Manual 5.0 0.0 - 5.0 %    Lymphocytes %, Manual 7.0 13.0 - 44.0 %    Monocytes %, Manual 6.0 2.0 - 10.0 %    Eosinophils %, Manual 3.0 0.0 - 6.0 %    Basophils %, Manual 0.0 0.0 - 2.0 %    Metamyelocytes %, Manual 4.0 0.0 - 0.0 %    Seg Neutrophils Absolute, Manual 14.10 (H) 1.20 - 7.00 x10*3/uL    Bands Absolute, Manual 0.94 (H) 0.00 - 0.70 x10*3/uL    Lymphocytes Absolute, Manual 1.32 1.20 - 4.80 x10*3/uL    Monocytes Absolute, Manual 1.13 (H) 0.10 - 1.00 x10*3/uL    Eosinophils Absolute, Manual 0.56 0.00 - 0.70 x10*3/uL    Basophils Absolute,  Manual 0.00 0.00 - 0.10 x10*3/uL    Metamyelocytes Absolute, Manual 0.75 0.00 - 0.00 x10*3/uL    Total Cells Counted 100     Neutrophils Absolute, Manual 15.04 (H) 1.20 - 7.70 x10*3/uL    RBC Morphology See Below     Polychromasia Mild     Target Cells Few     Ovalocytes Few                   Assessment/Plan   Active Problems:    Choledocholithiasis with acute cholecystitis    Gallstones      63F w/ h/o RYGB s/p lap subtotal cholecystectomy with cholangiogram and ERCP through gastric remnant for CHD stone admitted for further management.   She has uptrending leukocytosis without change in vitals or abdominal exam. UA, CXR and blood cultures are clean so far.   CT suspicious for developing abscess/biloma, drain in appropriate position. ID consulted: IV dapto and zosyn   Repeat CT 4/26: suspicious for subcutaneous abscess and intraabdominal abscesses. Diflucan added for positive candida from drain cultures.     -Keep drain in place. Will discuss bedside I&D with patient tomorrow.   -Pain control - scheduled tylenol, oxy q4h, toradol. Lidocaine patch for back. Gabapentin. Melatonin at night. Patient needed education regarding pain medications and that she can take oxycodone for back pain as needed.   -IR consult for aspiration/drainage of intraabdominal abscesses  -Repeat morning labs tomorrow   -Home health care initiated.  -SCDs, SQH, ambulation    Jenny Junior MD

## 2024-04-27 NOTE — CARE PLAN
The patient's goals for the shift include      The clinical goals for the shift include Pt will have decrease pain during shift      Problem: Pain  Goal: My pain/discomfort is manageable  Outcome: Progressing     Problem: Safety  Goal: Patient will be injury free during hospitalization  Outcome: Progressing  Goal: I will remain free of falls  Outcome: Progressing  Flowsheets (Taken 4/27/2024 1207)  Resident will remain free of falls: Utilize fall response kit     Problem: Daily Care  Goal: Daily care needs are met  Outcome: Progressing  Flowsheets (Taken 4/27/2024 1207)  Daily care needs are met: Encourage independent activity per ability     Problem: Psychosocial Needs  Goal: Demonstrates ability to cope with hospitalization/illness  Outcome: Progressing  Flowsheets (Taken 4/27/2024 1207)  Demonstrates ability to cope with hospitalization/illness: Encourage resident to set and complete small goals for self  Goal: Collaborate with me, my family, and caregiver to identify my specific goals  Outcome: Progressing  Flowsheets (Taken 4/20/2024 1900 by Swetha Lennon RN)  Cultural Requests During Hospitalization: na  Spiritual Requests During Hospitalization: na     Problem: Discharge Barriers  Goal: My discharge needs are met  Outcome: Progressing  Flowsheets (Taken 4/27/2024 1207)  Resident's discharge needs are met: Involve resident/family/caregiver in discharge planning process

## 2024-04-27 NOTE — PROGRESS NOTES
"Mandy Ramirez is a 63 y.o. female on day 10 of admission presenting with Cholecystitis.    Subjective   CT reviewed. Patient's appetite improving. Diflucan ordered yesterday for +candida from drain cultures. No fever/chills.  Ambulating.        Objective     Physical Exam  Vitals reviewed.   Constitutional:       Appearance: Normal appearance. She is obese.   HENT:      Head: Normocephalic and atraumatic.      Nose: Nose normal.      Mouth/Throat:      Mouth: Mucous membranes are moist.   Eyes:      Extraocular Movements: Extraocular movements intact.      Pupils: Pupils are equal, round, and reactive to light.   Cardiovascular:      Rate and Rhythm: Normal rate and regular rhythm.   Pulmonary:      Effort: Pulmonary effort is normal.   Abdominal:      Palpations: Abdomen is soft.      Tenderness: There is abdominal tenderness (mild incisional).      Comments: Incisions c/d/i   Musculoskeletal:         General: Normal range of motion.      Cervical back: Normal range of motion and neck supple. No edema lower extremity  Skin:     General: Skin is warm and dry.      Capillary Refill: Capillary refill takes less than 2 seconds.   Neurological:      General: No focal deficit present.      Mental Status: She is alert.   Psychiatric:         Mood and Affect: Mood normal.         Behavior: Behavior normal.         Thought Content: Thought content normal.         Judgment: Judgment normal.      Drain: 20cc/24h cloudy output  Just lateral to the drain site, redness is more localized and can feel fluctuance underneath. No crepitus.     Last Recorded Vitals  Blood pressure 121/82, pulse 83, temperature 35.8 °C (96.4 °F), temperature source Temporal, resp. rate 18, height 1.727 m (5' 8\"), weight 97.1 kg (214 lb), SpO2 94%.  Intake/Output last 3 Shifts:  I/O last 3 completed shifts:  In: 100 (1 mL/kg) [IV Piggyback:100]  Out: 21 (0.2 mL/kg) [Urine:1 (0 mL/kg/hr); Drains:20]  Weight: 97.1 kg     Relevant Results            "   This patient has a central line   Reason for the central line remaining today? Parenteral medication                 Assessment/Plan   Active Problems:    Choledocholithiasis with acute cholecystitis    Gallstones      63F w/ h/o RYGB s/p lap subtotal cholecystectomy with cholangiogram and ERCP through gastric remnant for CHD stone admitted for further management.   She has uptrending leukocytosis without change in vitals or abdominal exam. UA, CXR and blood cultures are clean so far.   CT suspicious for developing abscess/biloma, drain in appropriate position. ID consulted: IV dapto and zosyn   Repeat CT 4/26: suspicious for subcutaneous abscess and intraabdominal abscesses. Diflucan added for positive candida from drain cultures. Bedside I&D performed today after obtaining patient's consent - Hematoma with debris evacuate ~ 30cc      -Keep drain in place.  -ID recs appreciated: IV dapto, zosyn, diflucan  -Pain control - scheduled tylenol, oxy q4h, toradol. Lidocaine patch for back. Gabapentin. Melatonin at night.   -IR consult for aspiration/drainage of intraabdominal abscesses  -Repeat morning labs tomorrow   -Home health care initiated.  -SCDs, SQH, ambulation  Lengthy discussion with patient on her not going home this weekend which she is agreeable with.    Jenny Junior MD

## 2024-04-28 LAB
ALBUMIN SERPL BCP-MCNC: 3.3 G/DL (ref 3.4–5)
ALP SERPL-CCNC: 61 U/L (ref 33–136)
ALT SERPL W P-5'-P-CCNC: 13 U/L (ref 7–45)
ANION GAP SERPL CALC-SCNC: 11 MMOL/L (ref 10–20)
AST SERPL W P-5'-P-CCNC: 20 U/L (ref 9–39)
BACTERIA BLD CULT: NORMAL
BACTERIA BLD CULT: NORMAL
BASOPHILS # BLD MANUAL: 0 X10*3/UL (ref 0–0.1)
BASOPHILS NFR BLD MANUAL: 0 %
BILIRUB SERPL-MCNC: 0.4 MG/DL (ref 0–1.2)
BUN SERPL-MCNC: 8 MG/DL (ref 6–23)
CALCIUM SERPL-MCNC: 8.7 MG/DL (ref 8.6–10.3)
CHLORIDE SERPL-SCNC: 102 MMOL/L (ref 98–107)
CO2 SERPL-SCNC: 28 MMOL/L (ref 21–32)
CREAT SERPL-MCNC: 0.73 MG/DL (ref 0.5–1.05)
EGFRCR SERPLBLD CKD-EPI 2021: >90 ML/MIN/1.73M*2
EOSINOPHIL # BLD MANUAL: 0.84 X10*3/UL (ref 0–0.7)
EOSINOPHIL NFR BLD MANUAL: 4 %
ERYTHROCYTE [DISTWIDTH] IN BLOOD BY AUTOMATED COUNT: 14.3 % (ref 11.5–14.5)
GLUCOSE SERPL-MCNC: 93 MG/DL (ref 74–99)
HCT VFR BLD AUTO: 35.6 % (ref 36–46)
HGB BLD-MCNC: 11.9 G/DL (ref 12–16)
IMM GRANULOCYTES # BLD AUTO: 1.29 X10*3/UL (ref 0–0.7)
IMM GRANULOCYTES NFR BLD AUTO: 6.1 % (ref 0–0.9)
LYMPHOCYTES # BLD MANUAL: 3.38 X10*3/UL (ref 1.2–4.8)
LYMPHOCYTES NFR BLD MANUAL: 16 %
MCH RBC QN AUTO: 30.7 PG (ref 26–34)
MCHC RBC AUTO-ENTMCNC: 33.4 G/DL (ref 32–36)
MCV RBC AUTO: 92 FL (ref 80–100)
METAMYELOCYTES # BLD MANUAL: 0.42 X10*3/UL
METAMYELOCYTES NFR BLD MANUAL: 2 %
MONOCYTES # BLD MANUAL: 1.06 X10*3/UL (ref 0.1–1)
MONOCYTES NFR BLD MANUAL: 5 %
MYELOCYTES # BLD MANUAL: 0.21 X10*3/UL
MYELOCYTES NFR BLD MANUAL: 1 %
NEUTROPHILS # BLD MANUAL: 15.19 X10*3/UL (ref 1.2–7.7)
NEUTS BAND # BLD MANUAL: 0.63 X10*3/UL (ref 0–0.7)
NEUTS BAND NFR BLD MANUAL: 3 %
NEUTS SEG # BLD MANUAL: 14.56 X10*3/UL (ref 1.2–7)
NEUTS SEG NFR BLD MANUAL: 69 %
NRBC BLD-RTO: 0 /100 WBCS (ref 0–0)
PLATELET # BLD AUTO: 636 X10*3/UL (ref 150–450)
POLYCHROMASIA BLD QL SMEAR: ABNORMAL
POTASSIUM SERPL-SCNC: 4 MMOL/L (ref 3.5–5.3)
PROT SERPL-MCNC: 5.9 G/DL (ref 6.4–8.2)
RBC # BLD AUTO: 3.87 X10*6/UL (ref 4–5.2)
RBC MORPH BLD: ABNORMAL
SODIUM SERPL-SCNC: 137 MMOL/L (ref 136–145)
TOTAL CELLS COUNTED BLD: 100
WBC # BLD AUTO: 21.1 X10*3/UL (ref 4.4–11.3)

## 2024-04-28 PROCEDURE — 2500000006 HC RX 250 W HCPCS SELF ADMINISTERED DRUGS (ALT 637 FOR ALL PAYERS): Performed by: STUDENT IN AN ORGANIZED HEALTH CARE EDUCATION/TRAINING PROGRAM

## 2024-04-28 PROCEDURE — 2500000005 HC RX 250 GENERAL PHARMACY W/O HCPCS: Performed by: STUDENT IN AN ORGANIZED HEALTH CARE EDUCATION/TRAINING PROGRAM

## 2024-04-28 PROCEDURE — 85027 COMPLETE CBC AUTOMATED: CPT | Performed by: STUDENT IN AN ORGANIZED HEALTH CARE EDUCATION/TRAINING PROGRAM

## 2024-04-28 PROCEDURE — 2500000001 HC RX 250 WO HCPCS SELF ADMINISTERED DRUGS (ALT 637 FOR MEDICARE OP): Performed by: INTERNAL MEDICINE

## 2024-04-28 PROCEDURE — 2500000004 HC RX 250 GENERAL PHARMACY W/ HCPCS (ALT 636 FOR OP/ED): Performed by: INTERNAL MEDICINE

## 2024-04-28 PROCEDURE — 80053 COMPREHEN METABOLIC PANEL: CPT | Performed by: STUDENT IN AN ORGANIZED HEALTH CARE EDUCATION/TRAINING PROGRAM

## 2024-04-28 PROCEDURE — 85007 BL SMEAR W/DIFF WBC COUNT: CPT | Performed by: STUDENT IN AN ORGANIZED HEALTH CARE EDUCATION/TRAINING PROGRAM

## 2024-04-28 PROCEDURE — 2500000004 HC RX 250 GENERAL PHARMACY W/ HCPCS (ALT 636 FOR OP/ED): Performed by: STUDENT IN AN ORGANIZED HEALTH CARE EDUCATION/TRAINING PROGRAM

## 2024-04-28 PROCEDURE — 1100000001 HC PRIVATE ROOM DAILY

## 2024-04-28 PROCEDURE — 2500000001 HC RX 250 WO HCPCS SELF ADMINISTERED DRUGS (ALT 637 FOR MEDICARE OP): Performed by: STUDENT IN AN ORGANIZED HEALTH CARE EDUCATION/TRAINING PROGRAM

## 2024-04-28 RX ADMIN — CHOLECALCIFEROL TAB 25 MCG (1000 UNIT) 1000 UNITS: 25 TAB at 08:38

## 2024-04-28 RX ADMIN — PIPERACILLIN SODIUM AND TAZOBACTAM SODIUM 3.38 G: 3; .375 INJECTION, SOLUTION INTRAVENOUS at 15:15

## 2024-04-28 RX ADMIN — ALTEPLASE 2 MG: 2.2 INJECTION, POWDER, LYOPHILIZED, FOR SOLUTION INTRAVENOUS at 16:49

## 2024-04-28 RX ADMIN — GABAPENTIN 200 MG: 100 CAPSULE ORAL at 20:30

## 2024-04-28 RX ADMIN — LIDOCAINE 4% 1 PATCH: 40 PATCH TOPICAL at 08:36

## 2024-04-28 RX ADMIN — PIPERACILLIN SODIUM AND TAZOBACTAM SODIUM 3.38 G: 3; .375 INJECTION, SOLUTION INTRAVENOUS at 08:36

## 2024-04-28 RX ADMIN — HEPARIN SODIUM 5000 UNITS: 5000 INJECTION INTRAVENOUS; SUBCUTANEOUS at 12:31

## 2024-04-28 RX ADMIN — ACETAMINOPHEN 975 MG: 325 TABLET ORAL at 20:29

## 2024-04-28 RX ADMIN — ACETAMINOPHEN 975 MG: 325 TABLET ORAL at 08:37

## 2024-04-28 RX ADMIN — SERTRALINE HYDROCHLORIDE 100 MG: 100 TABLET ORAL at 08:36

## 2024-04-28 RX ADMIN — POLYETHYLENE GLYCOL 3350 17 G: 17 POWDER, FOR SOLUTION ORAL at 08:36

## 2024-04-28 RX ADMIN — PIPERACILLIN SODIUM AND TAZOBACTAM SODIUM 3.38 G: 3; .375 INJECTION, SOLUTION INTRAVENOUS at 20:30

## 2024-04-28 RX ADMIN — FLUCONAZOLE 200 MG: 100 TABLET ORAL at 08:38

## 2024-04-28 RX ADMIN — HEPARIN SODIUM 5000 UNITS: 5000 INJECTION INTRAVENOUS; SUBCUTANEOUS at 06:28

## 2024-04-28 RX ADMIN — TRAZODONE HYDROCHLORIDE 100 MG: 50 TABLET ORAL at 23:37

## 2024-04-28 RX ADMIN — BUPROPION HYDROCHLORIDE 150 MG: 150 TABLET, FILM COATED, EXTENDED RELEASE ORAL at 08:36

## 2024-04-28 RX ADMIN — PIPERACILLIN SODIUM AND TAZOBACTAM SODIUM 3.38 G: 3; .375 INJECTION, SOLUTION INTRAVENOUS at 02:41

## 2024-04-28 RX ADMIN — ACETAMINOPHEN 975 MG: 325 TABLET ORAL at 02:46

## 2024-04-28 RX ADMIN — VITAM B12 100 MCG: 100 TAB at 08:38

## 2024-04-28 RX ADMIN — PANTOPRAZOLE SODIUM 40 MG: 40 TABLET, DELAYED RELEASE ORAL at 06:28

## 2024-04-28 RX ADMIN — GABAPENTIN 200 MG: 100 CAPSULE ORAL at 06:28

## 2024-04-28 RX ADMIN — LEVOTHYROXINE SODIUM 50 MCG: 50 TABLET ORAL at 08:36

## 2024-04-28 RX ADMIN — DAPTOMYCIN 450 MG: 500 INJECTION, POWDER, LYOPHILIZED, FOR SOLUTION INTRAVENOUS at 16:39

## 2024-04-28 RX ADMIN — OXYCODONE HYDROCHLORIDE 5 MG: 5 SOLUTION ORAL at 16:40

## 2024-04-28 RX ADMIN — Medication 5 MG: at 23:34

## 2024-04-28 RX ADMIN — Medication 25 MG: at 08:38

## 2024-04-28 RX ADMIN — BUPROPION HYDROCHLORIDE 300 MG: 150 TABLET, EXTENDED RELEASE ORAL at 08:38

## 2024-04-28 RX ADMIN — GABAPENTIN 200 MG: 100 CAPSULE ORAL at 12:21

## 2024-04-28 RX ADMIN — HEPARIN SODIUM 5000 UNITS: 5000 INJECTION INTRAVENOUS; SUBCUTANEOUS at 20:30

## 2024-04-28 ASSESSMENT — COGNITIVE AND FUNCTIONAL STATUS - GENERAL
DAILY ACTIVITIY SCORE: 24
MOBILITY SCORE: 24

## 2024-04-28 ASSESSMENT — PAIN - FUNCTIONAL ASSESSMENT
PAIN_FUNCTIONAL_ASSESSMENT: 0-10

## 2024-04-28 ASSESSMENT — PAIN SCALES - GENERAL
PAINLEVEL_OUTOF10: 0 - NO PAIN
PAINLEVEL_OUTOF10: 2
PAINLEVEL_OUTOF10: 0 - NO PAIN
PAINLEVEL_OUTOF10: 4

## 2024-04-28 NOTE — CARE PLAN
The patient's goals for the shift include      The clinical goals for the shift include pain management      Problem: Pain  Goal: My pain/discomfort is manageable  Outcome: Progressing     Problem: Safety  Goal: Patient will be injury free during hospitalization  Outcome: Progressing  Goal: I will remain free of falls  Outcome: Progressing     Problem: Daily Care  Goal: Daily care needs are met  Outcome: Progressing     Problem: Psychosocial Needs  Goal: Demonstrates ability to cope with hospitalization/illness  Outcome: Progressing  Goal: Collaborate with me, my family, and caregiver to identify my specific goals  Outcome: Progressing     Problem: Discharge Barriers  Goal: My discharge needs are met  Outcome: Progressing

## 2024-04-28 NOTE — PROGRESS NOTES
"Mandy Ramirez is a 63 y.o. female on day 11 of admission presenting with Cholecystitis.    Subjective   Patient is feeling anxious about not progressing. No fever/chills. Appetite is the same - she's attempting to eat more protein. Complained of left sided \"deep\" pain with bending ~90 degrees. She was anxious about having an underlying infection in that area.       Objective     Physical Exam  Vitals reviewed.   Constitutional:       Appearance: Normal appearance. She is obese.   HENT:      Head: Normocephalic and atraumatic.      Nose: Nose normal.      Mouth/Throat:      Mouth: Mucous membranes are moist.   Eyes:      Extraocular Movements: Extraocular movements intact.      Pupils: Pupils are equal, round, and reactive to light.   Cardiovascular:      Rate and Rhythm: Normal rate and regular rhythm.   Pulmonary:      Effort: Pulmonary effort is normal.   Abdominal:      Palpations: Abdomen is soft.      Tenderness: There is abdominal tenderness (mild incisional).      Comments: Incisions c/d/i   Musculoskeletal:         General: Normal range of motion.      Cervical back: Normal range of motion and neck supple. No edema lower extremity  Skin:     General: Skin is warm and dry.      Capillary Refill: Capillary refill takes less than 2 seconds.   Neurological:      General: No focal deficit present.      Mental Status: She is alert.   Psychiatric:         Mood and Affect: Mood normal.         Behavior: Behavior normal.         Thought Content: Thought content normal.         Judgment: Judgment normal.      Drain: bulb has cloudy output ~20cc    Induration and cellulitis around drain site improving. No crepitus. Packing removed and replaced with new wet to dry dressing.    Last Recorded Vitals  Blood pressure 126/73, pulse 88, temperature 36.1 °C (97 °F), temperature source Temporal, resp. rate 18, height 1.727 m (5' 8\"), weight 97.1 kg (214 lb), SpO2 96%.  Intake/Output last 3 Shifts:  I/O last 3 completed " shifts:  In: 1007 (10.4 mL/kg) [P.O.:957; IV Piggyback:50]  Out: 11 (0.1 mL/kg) [Urine:1 (0 mL/kg/hr); Drains:10]  Weight: 97.1 kg     Relevant Results                    Results for orders placed or performed during the hospital encounter of 04/17/24 (from the past 24 hour(s))   CBC and Auto Differential   Result Value Ref Range    WBC 21.1 (H) 4.4 - 11.3 x10*3/uL    nRBC 0.0 0.0 - 0.0 /100 WBCs    RBC 3.87 (L) 4.00 - 5.20 x10*6/uL    Hemoglobin 11.9 (L) 12.0 - 16.0 g/dL    Hematocrit 35.6 (L) 36.0 - 46.0 %    MCV 92 80 - 100 fL    MCH 30.7 26.0 - 34.0 pg    MCHC 33.4 32.0 - 36.0 g/dL    RDW 14.3 11.5 - 14.5 %    Platelets 636 (H) 150 - 450 x10*3/uL    Immature Granulocytes %, Automated 6.1 (H) 0.0 - 0.9 %    Immature Granulocytes Absolute, Automated 1.29 (H) 0.00 - 0.70 x10*3/uL   Comprehensive Metabolic Panel   Result Value Ref Range    Glucose 93 74 - 99 mg/dL    Sodium 137 136 - 145 mmol/L    Potassium 4.0 3.5 - 5.3 mmol/L    Chloride 102 98 - 107 mmol/L    Bicarbonate 28 21 - 32 mmol/L    Anion Gap 11 10 - 20 mmol/L    Urea Nitrogen 8 6 - 23 mg/dL    Creatinine 0.73 0.50 - 1.05 mg/dL    eGFR >90 >60 mL/min/1.73m*2    Calcium 8.7 8.6 - 10.3 mg/dL    Albumin 3.3 (L) 3.4 - 5.0 g/dL    Alkaline Phosphatase 61 33 - 136 U/L    Total Protein 5.9 (L) 6.4 - 8.2 g/dL    AST 20 9 - 39 U/L    Bilirubin, Total 0.4 0.0 - 1.2 mg/dL    ALT 13 7 - 45 U/L   Manual Differential   Result Value Ref Range    Neutrophils %, Manual 69.0 40.0 - 80.0 %    Bands %, Manual 3.0 0.0 - 5.0 %    Lymphocytes %, Manual 16.0 13.0 - 44.0 %    Monocytes %, Manual 5.0 2.0 - 10.0 %    Eosinophils %, Manual 4.0 0.0 - 6.0 %    Basophils %, Manual 0.0 0.0 - 2.0 %    Metamyelocytes %, Manual 2.0 0.0 - 0.0 %    Myelocytes %, Manual 1.0 0.0 - 0.0 %    Seg Neutrophils Absolute, Manual 14.56 (H) 1.20 - 7.00 x10*3/uL    Bands Absolute, Manual 0.63 0.00 - 0.70 x10*3/uL    Lymphocytes Absolute, Manual 3.38 1.20 - 4.80 x10*3/uL    Monocytes Absolute, Manual  1.06 (H) 0.10 - 1.00 x10*3/uL    Eosinophils Absolute, Manual 0.84 (H) 0.00 - 0.70 x10*3/uL    Basophils Absolute, Manual 0.00 0.00 - 0.10 x10*3/uL    Metamyelocytes Absolute, Manual 0.42 0.00 - 0.00 x10*3/uL    Myelocytes Absolute, Manual 0.21 0.00 - 0.00 x10*3/uL    Total Cells Counted 100     Neutrophils Absolute, Manual 15.19 (H) 1.20 - 7.70 x10*3/uL    RBC Morphology See Below     Polychromasia Mild               Assessment/Plan   Active Problems:    Choledocholithiasis with acute cholecystitis    Gallstones    63F w/ h/o RYGB s/p lap subtotal cholecystectomy with cholangiogram and ERCP through gastric remnant for CHD stone admitted for further management.   She has uptrending leukocytosis without change in vitals or abdominal exam. UA, CXR and blood cultures are clean so far.   CT suspicious for developing abscess/biloma, drain in appropriate position. ID consulted: IV dapto and zosyn   Repeat CT 4/26: suspicious for subcutaneous abscess and intraabdominal abscesses. Diflucan added for positive candida from drain cultures. Bedside I&D done 4/27: hematoma with tinge of purulence and debris.     -Keep drain in place.   -Reassured patient that left sided incisions all look clean and non infected.   -Pain control - scheduled tylenol, oxy q4h, toradol. Lidocaine patch for back. Gabapentin. Melatonin at night. Patient needed education regarding pain medications and that she can take oxycodone for back pain as needed.   -IR consult for aspiration/drainage of intraabdominal abscesses  -NPO@MN for possible IR tomorrow  -Repeat morning labs tomorrow   -Home health care initiated.  -SCDs, SQH, ambulation      Jenny Junior MD

## 2024-04-28 NOTE — CARE PLAN
The patient's goals for the shift include      The clinical goals for the shift include pain management      Problem: Pain  Goal: My pain/discomfort is manageable  Outcome: Progressing     Problem: Safety  Goal: Patient will be injury free during hospitalization  Outcome: Progressing  Goal: I will remain free of falls  Outcome: Progressing     Problem: Daily Care  Goal: Daily care needs are met  Outcome: Progressing     Problem: Psychosocial Needs  Goal: Demonstrates ability to cope with hospitalization/illness  Outcome: Progressing  Goal: Collaborate with me, my family, and caregiver to identify my specific goals  Outcome: Progressing

## 2024-04-29 ENCOUNTER — APPOINTMENT (OUTPATIENT)
Dept: RADIOLOGY | Facility: HOSPITAL | Age: 64
DRG: 417 | End: 2024-04-29
Payer: COMMERCIAL

## 2024-04-29 LAB
ALBUMIN SERPL BCP-MCNC: 3.4 G/DL (ref 3.4–5)
ALP SERPL-CCNC: 63 U/L (ref 33–136)
ALT SERPL W P-5'-P-CCNC: 14 U/L (ref 7–45)
ANION GAP SERPL CALC-SCNC: 11 MMOL/L (ref 10–20)
AST SERPL W P-5'-P-CCNC: 17 U/L (ref 9–39)
BASOPHILS # BLD AUTO: 0.13 X10*3/UL (ref 0–0.1)
BASOPHILS NFR BLD AUTO: 0.6 %
BILIRUB SERPL-MCNC: 0.4 MG/DL (ref 0–1.2)
BUN SERPL-MCNC: 10 MG/DL (ref 6–23)
CALCIUM SERPL-MCNC: 8.8 MG/DL (ref 8.6–10.3)
CHLORIDE SERPL-SCNC: 101 MMOL/L (ref 98–107)
CO2 SERPL-SCNC: 29 MMOL/L (ref 21–32)
CREAT SERPL-MCNC: 0.76 MG/DL (ref 0.5–1.05)
EGFRCR SERPLBLD CKD-EPI 2021: 88 ML/MIN/1.73M*2
EOSINOPHIL # BLD AUTO: 0.88 X10*3/UL (ref 0–0.7)
EOSINOPHIL NFR BLD AUTO: 4.1 %
ERYTHROCYTE [DISTWIDTH] IN BLOOD BY AUTOMATED COUNT: 14.2 % (ref 11.5–14.5)
GLUCOSE SERPL-MCNC: 99 MG/DL (ref 74–99)
HCT VFR BLD AUTO: 35.7 % (ref 36–46)
HGB BLD-MCNC: 12.2 G/DL (ref 12–16)
IMM GRANULOCYTES # BLD AUTO: 1.02 X10*3/UL (ref 0–0.7)
IMM GRANULOCYTES NFR BLD AUTO: 4.8 % (ref 0–0.9)
LABORATORY COMMENT REPORT: NORMAL
LYMPHOCYTES # BLD AUTO: 2.81 X10*3/UL (ref 1.2–4.8)
LYMPHOCYTES NFR BLD AUTO: 13.2 %
MCH RBC QN AUTO: 31 PG (ref 26–34)
MCHC RBC AUTO-ENTMCNC: 34.2 G/DL (ref 32–36)
MCV RBC AUTO: 91 FL (ref 80–100)
MONOCYTES # BLD AUTO: 1.16 X10*3/UL (ref 0.1–1)
MONOCYTES NFR BLD AUTO: 5.4 %
NEUTROPHILS # BLD AUTO: 15.3 X10*3/UL (ref 1.2–7.7)
NEUTROPHILS NFR BLD AUTO: 71.9 %
NRBC BLD-RTO: 0 /100 WBCS (ref 0–0)
PATH REPORT.FINAL DX SPEC: NORMAL
PATH REPORT.GROSS SPEC: NORMAL
PATH REPORT.RELEVANT HX SPEC: NORMAL
PATH REPORT.TOTAL CANCER: NORMAL
PLATELET # BLD AUTO: 665 X10*3/UL (ref 150–450)
POTASSIUM SERPL-SCNC: 4.1 MMOL/L (ref 3.5–5.3)
PROT SERPL-MCNC: 6.2 G/DL (ref 6.4–8.2)
RBC # BLD AUTO: 3.93 X10*6/UL (ref 4–5.2)
SODIUM SERPL-SCNC: 137 MMOL/L (ref 136–145)
WBC # BLD AUTO: 21.3 X10*3/UL (ref 4.4–11.3)

## 2024-04-29 PROCEDURE — 0W9G3ZX DRAINAGE OF PERITONEAL CAVITY, PERCUTANEOUS APPROACH, DIAGNOSTIC: ICD-10-PCS | Performed by: RADIOLOGY

## 2024-04-29 PROCEDURE — 76942 ECHO GUIDE FOR BIOPSY: CPT

## 2024-04-29 PROCEDURE — 2500000001 HC RX 250 WO HCPCS SELF ADMINISTERED DRUGS (ALT 637 FOR MEDICARE OP): Performed by: STUDENT IN AN ORGANIZED HEALTH CARE EDUCATION/TRAINING PROGRAM

## 2024-04-29 PROCEDURE — 2720000007 HC OR 272 NO HCPCS

## 2024-04-29 PROCEDURE — 1100000001 HC PRIVATE ROOM DAILY

## 2024-04-29 PROCEDURE — 2500000005 HC RX 250 GENERAL PHARMACY W/O HCPCS: Performed by: RADIOLOGY

## 2024-04-29 PROCEDURE — 2500000004 HC RX 250 GENERAL PHARMACY W/ HCPCS (ALT 636 FOR OP/ED): Performed by: INTERNAL MEDICINE

## 2024-04-29 PROCEDURE — C1729 CATH, DRAINAGE: HCPCS

## 2024-04-29 PROCEDURE — 85025 COMPLETE CBC W/AUTO DIFF WBC: CPT | Performed by: STUDENT IN AN ORGANIZED HEALTH CARE EDUCATION/TRAINING PROGRAM

## 2024-04-29 PROCEDURE — 10160 PNXR ASPIR ABSC HMTMA BULLA: CPT | Performed by: RADIOLOGY

## 2024-04-29 PROCEDURE — 87205 SMEAR GRAM STAIN: CPT | Mod: PARLAB | Performed by: STUDENT IN AN ORGANIZED HEALTH CARE EDUCATION/TRAINING PROGRAM

## 2024-04-29 PROCEDURE — 76942 ECHO GUIDE FOR BIOPSY: CPT | Performed by: RADIOLOGY

## 2024-04-29 PROCEDURE — 2500000001 HC RX 250 WO HCPCS SELF ADMINISTERED DRUGS (ALT 637 FOR MEDICARE OP): Performed by: INTERNAL MEDICINE

## 2024-04-29 PROCEDURE — 80053 COMPREHEN METABOLIC PANEL: CPT | Performed by: STUDENT IN AN ORGANIZED HEALTH CARE EDUCATION/TRAINING PROGRAM

## 2024-04-29 PROCEDURE — 2500000006 HC RX 250 W HCPCS SELF ADMINISTERED DRUGS (ALT 637 FOR ALL PAYERS): Performed by: STUDENT IN AN ORGANIZED HEALTH CARE EDUCATION/TRAINING PROGRAM

## 2024-04-29 RX ORDER — LIDOCAINE HYDROCHLORIDE 10 MG/ML
INJECTION, SOLUTION EPIDURAL; INFILTRATION; INTRACAUDAL; PERINEURAL
Status: COMPLETED | OUTPATIENT
Start: 2024-04-29 | End: 2024-04-29

## 2024-04-29 RX ADMIN — VITAM B12 100 MCG: 100 TAB at 08:43

## 2024-04-29 RX ADMIN — PIPERACILLIN SODIUM AND TAZOBACTAM SODIUM 3.38 G: 3; .375 INJECTION, SOLUTION INTRAVENOUS at 12:01

## 2024-04-29 RX ADMIN — PIPERACILLIN SODIUM AND TAZOBACTAM SODIUM 3.38 G: 3; .375 INJECTION, SOLUTION INTRAVENOUS at 03:07

## 2024-04-29 RX ADMIN — ACETAMINOPHEN 975 MG: 325 TABLET ORAL at 14:50

## 2024-04-29 RX ADMIN — LEVOTHYROXINE SODIUM 50 MCG: 50 TABLET ORAL at 08:42

## 2024-04-29 RX ADMIN — Medication 5 MG: at 20:34

## 2024-04-29 RX ADMIN — PIPERACILLIN SODIUM AND TAZOBACTAM SODIUM 3.38 G: 3; .375 INJECTION, SOLUTION INTRAVENOUS at 18:02

## 2024-04-29 RX ADMIN — GABAPENTIN 200 MG: 100 CAPSULE ORAL at 14:50

## 2024-04-29 RX ADMIN — GABAPENTIN 200 MG: 100 CAPSULE ORAL at 22:15

## 2024-04-29 RX ADMIN — TRAZODONE HYDROCHLORIDE 100 MG: 50 TABLET ORAL at 20:33

## 2024-04-29 RX ADMIN — PANTOPRAZOLE SODIUM 40 MG: 40 TABLET, DELAYED RELEASE ORAL at 05:28

## 2024-04-29 RX ADMIN — ACETAMINOPHEN 975 MG: 325 TABLET ORAL at 03:08

## 2024-04-29 RX ADMIN — BUPROPION HYDROCHLORIDE 300 MG: 150 TABLET, EXTENDED RELEASE ORAL at 08:40

## 2024-04-29 RX ADMIN — SERTRALINE HYDROCHLORIDE 100 MG: 100 TABLET ORAL at 08:41

## 2024-04-29 RX ADMIN — BUPROPION HYDROCHLORIDE 150 MG: 150 TABLET, FILM COATED, EXTENDED RELEASE ORAL at 08:40

## 2024-04-29 RX ADMIN — CHOLECALCIFEROL TAB 25 MCG (1000 UNIT) 1000 UNITS: 25 TAB at 08:42

## 2024-04-29 RX ADMIN — LIDOCAINE HYDROCHLORIDE 1 ML: 10 INJECTION, SOLUTION EPIDURAL; INFILTRATION; INTRACAUDAL; PERINEURAL at 10:42

## 2024-04-29 RX ADMIN — GABAPENTIN 200 MG: 100 CAPSULE ORAL at 05:28

## 2024-04-29 RX ADMIN — DAPTOMYCIN 450 MG: 500 INJECTION, POWDER, LYOPHILIZED, FOR SOLUTION INTRAVENOUS at 08:45

## 2024-04-29 RX ADMIN — ACETAMINOPHEN 975 MG: 325 TABLET ORAL at 08:42

## 2024-04-29 RX ADMIN — ACETAMINOPHEN 975 MG: 325 TABLET ORAL at 20:33

## 2024-04-29 RX ADMIN — FLUCONAZOLE 200 MG: 100 TABLET ORAL at 08:43

## 2024-04-29 RX ADMIN — Medication 25 MG: at 08:43

## 2024-04-29 ASSESSMENT — PAIN - FUNCTIONAL ASSESSMENT
PAIN_FUNCTIONAL_ASSESSMENT: 0-10
PAIN_FUNCTIONAL_ASSESSMENT: 0-10

## 2024-04-29 ASSESSMENT — PAIN SCALES - GENERAL
PAINLEVEL_OUTOF10: 2

## 2024-04-29 NOTE — CARE PLAN
The patient's goals for the shift include      The clinical goals for the shift include comfort and safety      Problem: Pain  Goal: My pain/discomfort is manageable  Outcome: Progressing     Problem: Safety  Goal: Patient will be injury free during hospitalization  Outcome: Progressing  Goal: I will remain free of falls  Outcome: Progressing

## 2024-04-29 NOTE — PROCEDURES
Interventional Radiology Brief Postprocedure Note    Attending: Jeison Moses MD    Assistant:   Staff Role   Yanira Woods, AKOSUA Radiology Nurse   Jeison Moses MD Radiologist       Diagnosis:   1. Gallstones        2. Calculus of gallbladder and bile duct with cholecystitis without obstruction, unspecified cholecystitis acuity  ERCP      3. Cholecystitis  Surgical Pathology Exam    Surgical Pathology Exam      4. Choledocholithiasis with acute cholecystitis  Referral to Home Health      5. Bile leak  Venous Access, Home Infusion    alteplase (Cathflo Activase) 2 mg injection    Lower extremity venous duplex bilateral    Lower extremity venous duplex bilateral    DISCONTINUED: DAPTOmycin (Cubicin) 500 mg injection    DISCONTINUED: piperacillin-tazobactam (Zosyn) 3.375 gram injection      6. Other specified soft tissue disorders  Lower extremity venous duplex bilateral      7. Abdominal wall abscess  Basic Metabolic Panel    CBC    CK    DAPTOmycin (Cubicin) 500 mg injection    piperacillin-tazobactam (Zosyn) 3.375 gram injection          Description of procedure: US guided abscess fluid collection drainage    Timeout:  Yes    Procedure Area: Procedure Area     Anesthesia:   Conscious Sedation    Complications: None    Estimated Blood Loss: minimal    Medications (Filter: Administrations occurring from 1052 to 1052 on 04/29/24) As of 04/29/24 1052      None          No specimens collected      See detailed result report with images in PACS.    The patient tolerated the procedure well without incident or complication and is in stable condition.

## 2024-04-29 NOTE — PROGRESS NOTES
"Infectious disease progress note  Subjective   Abdominal abscess  Leukocytosis  Recent cholecystectomy  Just back from IR aspiration of multiple abdominal abscesses    Antibiotics  Daptomycin day 5  Zosyn day 5  Diflucan day 4    Objective   Range of Vitals (last 24 hours)  Heart Rate:  [80-96]   Temp:  [35.8 °C (96.4 °F)-36.3 °C (97.3 °F)]   Resp:  [15-18]   BP: (112-133)/(63-86)   SpO2:  [93 %-96 %]   Daily Weight  04/17/24 : 97.1 kg (214 lb)    Body mass index is 32.54 kg/m².      Physical Exam  HEENT PERRLA  Chest fair air entry bilaterally  CVS S1-S2 regular  Abdomen soft nontender positive bowel sounds; GABRIEL drain draining purulent drainage  Extremities possibles bilaterally no pitting edema      Relevant Results  Labs  Lab Results   Component Value Date    WBC 21.3 (H) 04/29/2024    HGB 12.2 04/29/2024    HCT 35.7 (L) 04/29/2024    MCV 91 04/29/2024     (H) 04/29/2024     Lab Results   Component Value Date    GLUCOSE 99 04/29/2024    CALCIUM 8.8 04/29/2024     04/29/2024    K 4.1 04/29/2024    CO2 29 04/29/2024     04/29/2024    BUN 10 04/29/2024    CREATININE 0.76 04/29/2024   ESR: --No results found for: \"SEDRATE\"No results found for: \"CRP\"  Lab Results   Component Value Date    ALT 14 04/29/2024    AST 17 04/29/2024    ALKPHOS 63 04/29/2024    BILITOT 0.4 04/29/2024       Microbiology    4- blood cultures no growth at 2 days  4- blood cultures negative to date  4- fluid culture Candida albicans  4- cultures sent from interventional radiology  Imaging  4- CT of the abdomen shows a fluid collection with several air locules in the right upper abdomen this is nonspecific may be postsurgical secondary to biloma, bile leak abscess etc.  There is reticulation of the abdominal fat in the right upper abdomen     Assessment/Plan   1.  Continue current antibiotics and antifungal  2.  We will follow-up IR cultures that were sent  3.  We will follow-up the CBC make " sure white count is now trending down    Other issues  Thelma-en-Y bypass chronic for patient  Cholecystectomy as above  Hiatal hernia repair    I reviewed and interpreted all lab test imaging studies and documentations from other healthcare providers  I am monitoring for antibiotic side effects and toxicity     Maya Dunn MD

## 2024-04-29 NOTE — PRE-PROCEDURE NOTE
Interventional Radiology Preprocedure Note    Indication for procedure: The primary encounter diagnosis was Gallstones. Diagnoses of Calculus of gallbladder and bile duct with cholecystitis without obstruction, unspecified cholecystitis acuity, Cholecystitis, Choledocholithiasis with acute cholecystitis, Bile leak, Other specified soft tissue disorders, and Abdominal wall abscess were also pertinent to this visit.    Relevant review of systems: NA    Relevant Labs:   Lab Results   Component Value Date    CREATININE 0.76 04/29/2024    EGFR 88 04/29/2024    INR 1.1 04/24/2024    PROTIME 12.9 (H) 04/24/2024       Planned Sedation/Anesthesia: Minimal    Airway assessment: normal    Directed physical examination:    Aox3  No increased work of breathing.   No acute distress      Mallampati: II (hard and soft palate, upper portion of tonsils and uvula visible)    ASA Score: ASA 2 - Patient with mild systemic disease with no functional limitations    Benefits, risks and alternatives of procedure and planned sedation have been discussed with the patient and/or their representative. All questions answered and they agree to proceed.

## 2024-04-29 NOTE — CARE PLAN
The patient's goals for the shift include        Problem: Pain  Goal: My pain/discomfort is manageable  Outcome: Progressing     Problem: Safety  Goal: Patient will be injury free during hospitalization  Outcome: Progressing  Goal: I will remain free of falls  Outcome: Progressing     Problem: Daily Care  Goal: Daily care needs are met  Outcome: Progressing     Problem: Psychosocial Needs  Goal: Demonstrates ability to cope with hospitalization/illness  Outcome: Progressing  Goal: Collaborate with me, my family, and caregiver to identify my specific goals  Outcome: Progressing     Problem: Discharge Barriers  Goal: My discharge needs are met  Outcome: Progressing

## 2024-04-30 LAB
ALBUMIN SERPL BCP-MCNC: 3.6 G/DL (ref 3.4–5)
ALP SERPL-CCNC: 69 U/L (ref 33–136)
ALT SERPL W P-5'-P-CCNC: 14 U/L (ref 7–45)
ANION GAP SERPL CALC-SCNC: 13 MMOL/L (ref 10–20)
AST SERPL W P-5'-P-CCNC: 19 U/L (ref 9–39)
BASOPHILS # BLD AUTO: 0.12 X10*3/UL (ref 0–0.1)
BASOPHILS NFR BLD AUTO: 0.6 %
BILIRUB SERPL-MCNC: 0.4 MG/DL (ref 0–1.2)
BUN SERPL-MCNC: 9 MG/DL (ref 6–23)
CALCIUM SERPL-MCNC: 9 MG/DL (ref 8.6–10.3)
CHLORIDE SERPL-SCNC: 101 MMOL/L (ref 98–107)
CK SERPL-CCNC: 34 U/L (ref 0–215)
CO2 SERPL-SCNC: 28 MMOL/L (ref 21–32)
CREAT SERPL-MCNC: 0.76 MG/DL (ref 0.5–1.05)
EGFRCR SERPLBLD CKD-EPI 2021: 88 ML/MIN/1.73M*2
EOSINOPHIL # BLD AUTO: 0.8 X10*3/UL (ref 0–0.7)
EOSINOPHIL NFR BLD AUTO: 4.2 %
ERYTHROCYTE [DISTWIDTH] IN BLOOD BY AUTOMATED COUNT: 14.4 % (ref 11.5–14.5)
GLUCOSE SERPL-MCNC: 97 MG/DL (ref 74–99)
HCT VFR BLD AUTO: 37.5 % (ref 36–46)
HGB BLD-MCNC: 12.4 G/DL (ref 12–16)
IMM GRANULOCYTES # BLD AUTO: 0.77 X10*3/UL (ref 0–0.7)
IMM GRANULOCYTES NFR BLD AUTO: 4 % (ref 0–0.9)
LYMPHOCYTES # BLD AUTO: 2.55 X10*3/UL (ref 1.2–4.8)
LYMPHOCYTES NFR BLD AUTO: 13.3 %
MCH RBC QN AUTO: 30.5 PG (ref 26–34)
MCHC RBC AUTO-ENTMCNC: 33.1 G/DL (ref 32–36)
MCV RBC AUTO: 92 FL (ref 80–100)
MONOCYTES # BLD AUTO: 1.15 X10*3/UL (ref 0.1–1)
MONOCYTES NFR BLD AUTO: 6 %
NEUTROPHILS # BLD AUTO: 13.76 X10*3/UL (ref 1.2–7.7)
NEUTROPHILS NFR BLD AUTO: 71.9 %
NRBC BLD-RTO: 0 /100 WBCS (ref 0–0)
PLATELET # BLD AUTO: 748 X10*3/UL (ref 150–450)
POTASSIUM SERPL-SCNC: 3.8 MMOL/L (ref 3.5–5.3)
PROT SERPL-MCNC: 6.4 G/DL (ref 6.4–8.2)
RBC # BLD AUTO: 4.07 X10*6/UL (ref 4–5.2)
SODIUM SERPL-SCNC: 138 MMOL/L (ref 136–145)
WBC # BLD AUTO: 19.2 X10*3/UL (ref 4.4–11.3)

## 2024-04-30 PROCEDURE — 36415 COLL VENOUS BLD VENIPUNCTURE: CPT | Performed by: STUDENT IN AN ORGANIZED HEALTH CARE EDUCATION/TRAINING PROGRAM

## 2024-04-30 PROCEDURE — 80053 COMPREHEN METABOLIC PANEL: CPT | Performed by: STUDENT IN AN ORGANIZED HEALTH CARE EDUCATION/TRAINING PROGRAM

## 2024-04-30 PROCEDURE — 2500000005 HC RX 250 GENERAL PHARMACY W/O HCPCS: Performed by: STUDENT IN AN ORGANIZED HEALTH CARE EDUCATION/TRAINING PROGRAM

## 2024-04-30 PROCEDURE — 2500000004 HC RX 250 GENERAL PHARMACY W/ HCPCS (ALT 636 FOR OP/ED): Performed by: STUDENT IN AN ORGANIZED HEALTH CARE EDUCATION/TRAINING PROGRAM

## 2024-04-30 PROCEDURE — 2500000001 HC RX 250 WO HCPCS SELF ADMINISTERED DRUGS (ALT 637 FOR MEDICARE OP): Performed by: STUDENT IN AN ORGANIZED HEALTH CARE EDUCATION/TRAINING PROGRAM

## 2024-04-30 PROCEDURE — 82550 ASSAY OF CK (CPK): CPT | Performed by: INTERNAL MEDICINE

## 2024-04-30 PROCEDURE — 2500000004 HC RX 250 GENERAL PHARMACY W/ HCPCS (ALT 636 FOR OP/ED): Performed by: INTERNAL MEDICINE

## 2024-04-30 PROCEDURE — 1100000001 HC PRIVATE ROOM DAILY

## 2024-04-30 PROCEDURE — 2500000006 HC RX 250 W HCPCS SELF ADMINISTERED DRUGS (ALT 637 FOR ALL PAYERS): Performed by: STUDENT IN AN ORGANIZED HEALTH CARE EDUCATION/TRAINING PROGRAM

## 2024-04-30 PROCEDURE — 85025 COMPLETE CBC W/AUTO DIFF WBC: CPT | Performed by: STUDENT IN AN ORGANIZED HEALTH CARE EDUCATION/TRAINING PROGRAM

## 2024-04-30 PROCEDURE — 2500000001 HC RX 250 WO HCPCS SELF ADMINISTERED DRUGS (ALT 637 FOR MEDICARE OP): Performed by: INTERNAL MEDICINE

## 2024-04-30 RX ADMIN — PIPERACILLIN SODIUM AND TAZOBACTAM SODIUM 3.38 G: 3; .375 INJECTION, SOLUTION INTRAVENOUS at 00:18

## 2024-04-30 RX ADMIN — ACETAMINOPHEN 975 MG: 325 TABLET ORAL at 21:41

## 2024-04-30 RX ADMIN — TRAZODONE HYDROCHLORIDE 100 MG: 50 TABLET ORAL at 21:41

## 2024-04-30 RX ADMIN — ACETAMINOPHEN 975 MG: 325 TABLET ORAL at 15:53

## 2024-04-30 RX ADMIN — Medication 5 MG: at 21:41

## 2024-04-30 RX ADMIN — ACETAMINOPHEN 975 MG: 325 TABLET ORAL at 03:51

## 2024-04-30 RX ADMIN — LIDOCAINE 4% 1 PATCH: 40 PATCH TOPICAL at 09:15

## 2024-04-30 RX ADMIN — SERTRALINE HYDROCHLORIDE 100 MG: 100 TABLET ORAL at 09:12

## 2024-04-30 RX ADMIN — FLUCONAZOLE 200 MG: 100 TABLET ORAL at 09:15

## 2024-04-30 RX ADMIN — CHOLECALCIFEROL TAB 25 MCG (1000 UNIT) 1000 UNITS: 25 TAB at 09:13

## 2024-04-30 RX ADMIN — POLYETHYLENE GLYCOL 3350 17 G: 17 POWDER, FOR SOLUTION ORAL at 09:15

## 2024-04-30 RX ADMIN — PIPERACILLIN SODIUM AND TAZOBACTAM SODIUM 3.38 G: 3; .375 INJECTION, SOLUTION INTRAVENOUS at 15:44

## 2024-04-30 RX ADMIN — PIPERACILLIN SODIUM AND TAZOBACTAM SODIUM 3.38 G: 3; .375 INJECTION, SOLUTION INTRAVENOUS at 20:56

## 2024-04-30 RX ADMIN — HEPARIN SODIUM 5000 UNITS: 5000 INJECTION INTRAVENOUS; SUBCUTANEOUS at 21:43

## 2024-04-30 RX ADMIN — ACETAMINOPHEN 975 MG: 325 TABLET ORAL at 09:13

## 2024-04-30 RX ADMIN — BUPROPION HYDROCHLORIDE 300 MG: 150 TABLET, EXTENDED RELEASE ORAL at 09:12

## 2024-04-30 RX ADMIN — LEVOTHYROXINE SODIUM 50 MCG: 50 TABLET ORAL at 09:13

## 2024-04-30 RX ADMIN — GABAPENTIN 200 MG: 100 CAPSULE ORAL at 21:41

## 2024-04-30 RX ADMIN — BUPROPION HYDROCHLORIDE 150 MG: 150 TABLET, FILM COATED, EXTENDED RELEASE ORAL at 09:11

## 2024-04-30 RX ADMIN — DAPTOMYCIN 450 MG: 500 INJECTION, POWDER, LYOPHILIZED, FOR SOLUTION INTRAVENOUS at 09:00

## 2024-04-30 RX ADMIN — GABAPENTIN 200 MG: 100 CAPSULE ORAL at 06:18

## 2024-04-30 RX ADMIN — PANTOPRAZOLE SODIUM 40 MG: 40 TABLET, DELAYED RELEASE ORAL at 06:18

## 2024-04-30 RX ADMIN — PIPERACILLIN SODIUM AND TAZOBACTAM SODIUM 3.38 G: 3; .375 INJECTION, SOLUTION INTRAVENOUS at 06:19

## 2024-04-30 RX ADMIN — Medication 25 MG: at 09:14

## 2024-04-30 RX ADMIN — VITAM B12 100 MCG: 100 TAB at 09:14

## 2024-04-30 RX ADMIN — GABAPENTIN 200 MG: 100 CAPSULE ORAL at 15:53

## 2024-04-30 ASSESSMENT — COGNITIVE AND FUNCTIONAL STATUS - GENERAL
DAILY ACTIVITIY SCORE: 24
MOBILITY SCORE: 24
MOBILITY SCORE: 24
DAILY ACTIVITIY SCORE: 24

## 2024-04-30 ASSESSMENT — PAIN SCALES - GENERAL
PAINLEVEL_OUTOF10: 2
PAINLEVEL_OUTOF10: 0 - NO PAIN

## 2024-04-30 NOTE — CARE PLAN
The patient's goals for the shift include      The clinical goals for the shift include comfort/safety/atb      Problem: Pain  Goal: My pain/discomfort is manageable  Outcome: Progressing  Flowsheets (Taken 4/30/2024 0805)  Resident's pain/discomfort is manageable: Offer non-pharmacological pain management interventions     Problem: Safety  Goal: Patient will be injury free during hospitalization  Outcome: Progressing  Goal: I will remain free of falls  Outcome: Progressing  Flowsheets (Taken 4/30/2024 0805)  Resident will remain free of falls: Assist with toileting as orderd     Problem: Daily Care  Goal: Daily care needs are met  Outcome: Progressing  Flowsheets (Taken 4/30/2024 0805)  Daily care needs are met: Assess skin integrity/risk for skin breakdown     Problem: Psychosocial Needs  Goal: Demonstrates ability to cope with hospitalization/illness  Outcome: Progressing  Flowsheets (Taken 4/30/2024 0805)  Demonstrates ability to cope with hospitalization/illness: Provide low-stimulation environment as needed  Goal: Collaborate with me, my family, and caregiver to identify my specific goals  Outcome: Progressing  Flowsheets (Taken 4/29/2024 1900 by Swetha Lennon, RN)  Cultural Requests During Hospitalization: none  Spiritual Requests During Hospitalization: none     Problem: Discharge Barriers  Goal: My discharge needs are met  Outcome: Progressing  Flowsheets (Taken 4/30/2024 0805)  Resident's discharge needs are met: Identify potential discharge barriers on admission and throughout stay

## 2024-04-30 NOTE — PROGRESS NOTES
"Infectious disease progress note  Subjective   Abdominal abscess  Leukocytosis  Recent cholecystectomy       Antibiotics    Objective   Range of Vitals (last 24 hours)  Heart Rate:  [87-95]   Temp:  [35.5 °C (95.9 °F)-36.3 °C (97.3 °F)]   Resp:  [16-18]   BP: (110-142)/(75-87)   SpO2:  [90 %-96 %]   Daily Weight  04/17/24 : 97.1 kg (214 lb)    Body mass index is 32.54 kg/m².      Physical Exam  HEENT PERRLA  Chest fair air entry bilaterally  CVS S1-S2 regular  Abdomen soft nontender positive bowel sounds; GABRIEL drain draining purulent drainage  Extremities possibles bilaterally no pitting edema  Relevant Results  Labs  Lab Results   Component Value Date    WBC 19.2 (H) 04/30/2024    HGB 12.4 04/30/2024    HCT 37.5 04/30/2024    MCV 92 04/30/2024     (H) 04/30/2024     Lab Results   Component Value Date    GLUCOSE 97 04/30/2024    CALCIUM 9.0 04/30/2024     04/30/2024    K 3.8 04/30/2024    CO2 28 04/30/2024     04/30/2024    BUN 9 04/30/2024    CREATININE 0.76 04/30/2024   ESR: --No results found for: \"SEDRATE\"No results found for: \"CRP\"  Lab Results   Component Value Date    ALT 14 04/30/2024    AST 19 04/30/2024    ALKPHOS 69 04/30/2024    BILITOT 0.4 04/30/2024       Microbiology  4- blood cultures no growth at 2 days  4- blood cultures negative to date  4- fluid culture Candida albicans  4- cultures sent from interventional radiology    Imaging  4- CT of the abdomen shows a fluid collection with several air locules in the right upper abdomen this is nonspecific may be postsurgical secondary to biloma, bile leak abscess etc.  There is reticulation of the abdominal fat in the right upper abdomen     Assessment/Plan   1.  Continue current antibiotics  2.  We will follow-up the repeat aspiration cultures from yesterday and then decide on antibiotic therapy.    Other issues  Thelma-en-Y bypass chronic for patient  Cholecystectomy as above  Hiatal hernia repair  I " reviewed and interpreted all lab test imaging studies and documentations from other healthcare providers  I am monitoring for antibiotic side effects and toxicity     Maya Dunn MD

## 2024-04-30 NOTE — PROGRESS NOTES
"GENERAL SURGERY CLINIC  FOLLOW UP NOTE      Name: Mandy Ramirez  MRN: [id number]      Index Surgery  Date of Surgery: 4/17/2024   Surgeon: Dr. Bedoya    Surgical Procedure: LAPAROSCOPIC CHOLECYSTECTOMY WITH OPERATIVE CHOLANGIOGRAMS & C-ARM/ GASTROSTOMY FOR ERCP/CLOSURE OF GASTROSTOMY/CHOLEDUCOSCOPE     HPI: 63 year old female presenting today for a post hospital discharge follow up.  Patient is s/p LAPAROSCOPIC CHOLECYSTECTOMY WITH OPERATIVE CHOLANGIOGRAMS & C-ARM/ GASTROSTOMY FOR ERCP/CLOSURE OF GASTROSTOMY/CHOLEDUCOSCOPE on 4/17/24.  Patient has an extended stay secondary to development of post operative infection. Dr. Dunn from ID managing PICC line and antibiotics.     Concerns related to:  Nausea/Vomiting, Reflux: Intermittent nausea  Abdominal Pain: Denies  Diarrhea/Constipation Diarrhea        REVIEW OF SYSTEMS:  CONSTITUTIONAL: Patient denies fevers, chills, sweats and weight changes.  CARDIOVASCULAR: Patient denies chest pains, palpitations, orthopnea and paroxysmal nocturnal dyspnea.  RESPIRATORY: No dyspnea on exertion, no wheezing or cough.  GI: No nausea, vomiting, diarrhea, constipation, abdominal pain, hematochezia or melena.  MUSCULOSKELETAL: No myalgias or arthralgias.  NEUROLOGIC: No chronic headaches, no seizures. Patient denies numbness, tingling or weakness.  PSYCHIATRIC: Patient denies problems with mood disturbance. No problems with anxiety.  ENDOCRINE: No excessive urination or excessive thirst.  DERMATOLOGIC: Patient denies any rashes or skin changes.    PHYSICAL EXAM:  /81 (BP Location: Right arm, Patient Position: Sitting, BP Cuff Size: Adult long)   Pulse 93   Temp 36.8 °C (98.2 °F) (Oral)   Ht 1.727 m (5' 8\")   Wt 92.7 kg (204 lb 6.4 oz)   SpO2 95%   BMI 31.08 kg/m²   Alert, well appearing, no acute distress, nourished, hydrated.  Anicteric sclera, no ptosis  Facial symmetry  Neck supple  Unlabored respirations.  Easily conversant without increased respiratory " effort  Oriented to person, place, time.  Judgement intact.  Appropriate mood, affect.   Abdomen is soft, nondistended, nontender  Drain site is clean with packing in place  Drain bulb has couple of cc of yellow serous fluid      ASSESSMENT & PLAN:  63 y.o. female presenting for follow up visit s/p cholecystectomy 4/17/2024.    Patient is doing well no sign of ongoing infection or bile leak, previous WBC 11 K, on antibiotics, minimal output from her drain for the last few days    Recommendations:    Drain was removed    Drain site is too shallow does not need packing anymore and dry dressing should be sufficient.    Follow-up with ID regarding length of antibiotics, from my standpoint antibiotics can be stopped.    Will get a repeat CBC and LFTs today.    Follow-up in 4 weeks.

## 2024-04-30 NOTE — PROGRESS NOTES
"Mandy Ramirez is a 63 y.o. female on day 13 of admission presenting with Cholecystitis.    Subjective   Patient feels better today. Tolerating her diet without issues. No fever/chills. She underwent IR aspiration of intraabdominal abscess yesterday yielding about 5cc of pus. Cultures were sent.       Objective     Physical Exam  Vitals reviewed.   Constitutional:       Appearance: Normal appearance. She is obese.   HENT:      Head: Normocephalic and atraumatic.      Nose: Nose normal.      Mouth/Throat:      Mouth: Mucous membranes are moist.   Eyes:      Extraocular Movements: Extraocular movements intact.      Pupils: Pupils are equal, round, and reactive to light.   Cardiovascular:      Rate and Rhythm: Normal rate and regular rhythm.   Pulmonary:      Effort: Pulmonary effort is normal.   Abdominal:      Palpations: Abdomen is soft.      Tenderness: There is abdominal tenderness (mild incisional).      Comments: Incisions c/d/i   Musculoskeletal:         General: Normal range of motion.      Cervical back: Normal range of motion and neck supple. No edema lower extremity  Skin:     General: Skin is warm and dry.      Capillary Refill: Capillary refill takes less than 2 seconds.   Neurological:      General: No focal deficit present.      Mental Status: She is alert.   Psychiatric:         Mood and Affect: Mood normal.         Behavior: Behavior normal.         Thought Content: Thought content normal.         Judgment: Judgment normal.      Drain: bulb has cloudy output ~20cc/24h     Induration and cellulitis around drain site stable. No crepitus. Packing removed with minimal amount of purulence expressed and replaced with new wet to dry dressing.     Last Recorded Vitals  Blood pressure 110/87, pulse 91, temperature 36.3 °C (97.3 °F), resp. rate 18, height 1.727 m (5' 8\"), weight 97.1 kg (214 lb), SpO2 92%.  Intake/Output last 3 Shifts:  I/O last 3 completed shifts:  In: 550 (5.7 mL/kg) [P.O.:350; IV " Piggyback:200]  Out: 20 (0.2 mL/kg) [Drains:20]  Weight: 97.1 kg     Relevant Results                    Results for orders placed or performed during the hospital encounter of 04/17/24 (from the past 24 hour(s))   CBC and Auto Differential   Result Value Ref Range    WBC 19.2 (H) 4.4 - 11.3 x10*3/uL    nRBC 0.0 0.0 - 0.0 /100 WBCs    RBC 4.07 4.00 - 5.20 x10*6/uL    Hemoglobin 12.4 12.0 - 16.0 g/dL    Hematocrit 37.5 36.0 - 46.0 %    MCV 92 80 - 100 fL    MCH 30.5 26.0 - 34.0 pg    MCHC 33.1 32.0 - 36.0 g/dL    RDW 14.4 11.5 - 14.5 %    Platelets 748 (H) 150 - 450 x10*3/uL    Neutrophils % 71.9 40.0 - 80.0 %    Immature Granulocytes %, Automated 4.0 (H) 0.0 - 0.9 %    Lymphocytes % 13.3 13.0 - 44.0 %    Monocytes % 6.0 2.0 - 10.0 %    Eosinophils % 4.2 0.0 - 6.0 %    Basophils % 0.6 0.0 - 2.0 %    Neutrophils Absolute 13.76 (H) 1.20 - 7.70 x10*3/uL    Immature Granulocytes Absolute, Automated 0.77 (H) 0.00 - 0.70 x10*3/uL    Lymphocytes Absolute 2.55 1.20 - 4.80 x10*3/uL    Monocytes Absolute 1.15 (H) 0.10 - 1.00 x10*3/uL    Eosinophils Absolute 0.80 (H) 0.00 - 0.70 x10*3/uL    Basophils Absolute 0.12 (H) 0.00 - 0.10 x10*3/uL   Comprehensive Metabolic Panel   Result Value Ref Range    Glucose 97 74 - 99 mg/dL    Sodium 138 136 - 145 mmol/L    Potassium 3.8 3.5 - 5.3 mmol/L    Chloride 101 98 - 107 mmol/L    Bicarbonate 28 21 - 32 mmol/L    Anion Gap 13 10 - 20 mmol/L    Urea Nitrogen 9 6 - 23 mg/dL    Creatinine 0.76 0.50 - 1.05 mg/dL    eGFR 88 >60 mL/min/1.73m*2    Calcium 9.0 8.6 - 10.3 mg/dL    Albumin 3.6 3.4 - 5.0 g/dL    Alkaline Phosphatase 69 33 - 136 U/L    Total Protein 6.4 6.4 - 8.2 g/dL    AST 19 9 - 39 U/L    Bilirubin, Total 0.4 0.0 - 1.2 mg/dL    ALT 14 7 - 45 U/L   Creatine Kinase   Result Value Ref Range    Creatine Kinase 34 0 - 215 U/L              Assessment/Plan   Active Problems:    Choledocholithiasis with acute cholecystitis    Gallstones      63F w/ h/o RYGB s/p lap subtotal  cholecystectomy with cholangiogram and ERCP through gastric remnant for CHD stone admitted for further management.   She has uptrending leukocytosis without change in vitals or abdominal exam. UA, CXR and blood cultures are clean so far.   CT suspicious for developing abscess/biloma, drain in appropriate position. ID consulted: IV dapto and zosyn   Repeat CT 4/26: suspicious for subcutaneous abscess and intraabdominal abscesses. Diflucan added for positive candida from drain cultures. Bedside I&D done 4/27: hematoma with tinge of purulence and debris. IR aspirated intraabdominal abscess ~5cc.      -Keep drain in place.   -Reassured patient that left sided incisions all look clean and non infected.   -Pain control - scheduled tylenol, oxy q4h, toradol. Lidocaine patch for back. Gabapentin. Melatonin at night.   -Awaiting cultures and further WBC downtrend  -Repeat morning labs tomorrow   -Home health care initiated  -SCDs, SQH, ambulation    Jenny Junior MD

## 2024-04-30 NOTE — PROGRESS NOTES
"Mandy Ramirez is a 63 y.o. female on day 13 of admission presenting with Cholecystitis.    Subjective   She's NPO for IR drainage of intraabdominal abscess. No fever/chills. She's tolerating her diet without issues. On IV abx and diflucan.       Objective     Physical Exam  Vitals reviewed.   Constitutional:       Appearance: Normal appearance. She is obese.   HENT:      Head: Normocephalic and atraumatic.      Nose: Nose normal.      Mouth/Throat:      Mouth: Mucous membranes are moist.   Eyes:      Extraocular Movements: Extraocular movements intact.      Pupils: Pupils are equal, round, and reactive to light.   Cardiovascular:      Rate and Rhythm: Normal rate and regular rhythm.   Pulmonary:      Effort: Pulmonary effort is normal.   Abdominal:      Palpations: Abdomen is soft.      Tenderness: There is abdominal tenderness (mild incisional).      Comments: Incisions c/d/i   Musculoskeletal:         General: Normal range of motion.      Cervical back: Normal range of motion and neck supple. No edema lower extremity  Skin:     General: Skin is warm and dry.      Capillary Refill: Capillary refill takes less than 2 seconds.   Neurological:      General: No focal deficit present.      Mental Status: She is alert.   Psychiatric:         Mood and Affect: Mood normal.         Behavior: Behavior normal.         Thought Content: Thought content normal.         Judgment: Judgment normal.      Drain: bulb has cloudy output ~20cc     Induration and cellulitis around drain site improving. No crepitus. Packing removed with minimal amount of purulence expressed and replaced with new wet to dry dressing.    Last Recorded Vitals  Blood pressure 110/87, pulse 91, temperature 36.3 °C (97.3 °F), resp. rate 18, height 1.727 m (5' 8\"), weight 97.1 kg (214 lb), SpO2 92%.  Intake/Output last 3 Shifts:  I/O last 3 completed shifts:  In: 550 (5.7 mL/kg) [P.O.:350; IV Piggyback:200]  Out: 20 (0.2 mL/kg) [Drains:20]  Weight: 97.1 kg "     Relevant Results              This patient has a central line   Reason for the central line remaining today? Parenteral medication    Assessment/Plan   Active Problems:    Choledocholithiasis with acute cholecystitis    Gallstones    63F w/ h/o RYGB s/p lap subtotal cholecystectomy with cholangiogram and ERCP through gastric remnant for CHD stone admitted for further management.   She has uptrending leukocytosis without change in vitals or abdominal exam. UA, CXR and blood cultures are clean so far.   CT suspicious for developing abscess/biloma, drain in appropriate position. ID consulted: IV dapto and zosyn   Repeat CT 4/26: suspicious for subcutaneous abscess and intraabdominal abscesses. Diflucan added for positive candida from drain cultures. Bedside I&D done 4/27: hematoma with tinge of purulence and debris.     -Keep drain in place.   -Reassured patient that left sided incisions all look clean and non infected.   -Pain control - scheduled tylenol, oxy q4h, toradol. Lidocaine patch for back. Gabapentin. Melatonin at night.   -IR consult for aspiration/drainage of intraabdominal abscesses  -NPO since midnight and SQH   -Repeat morning labs tomorrow   -Home health care initiated  -SCDs, SQH, ambulation    Jenny Junior MD

## 2024-04-30 NOTE — PATIENT INSTRUCTIONS
You are doing great!  You were seen for a post hospital discharge after your cholecystectomy   Follow up with any labs/testing as ordered   Continue IV antibiotics per ID recommendations   Follow up as advised     Our office number: [phone]

## 2024-05-01 ENCOUNTER — APPOINTMENT (OUTPATIENT)
Dept: RADIOLOGY | Facility: HOSPITAL | Age: 64
End: 2024-05-01
Payer: COMMERCIAL

## 2024-05-01 ENCOUNTER — APPOINTMENT (OUTPATIENT)
Dept: RADIOLOGY | Facility: HOSPITAL | Age: 64
DRG: 417 | End: 2024-05-01
Payer: COMMERCIAL

## 2024-05-01 LAB
ERYTHROCYTE [DISTWIDTH] IN BLOOD BY AUTOMATED COUNT: 14.4 % (ref 11.5–14.5)
HCT VFR BLD AUTO: 37.2 % (ref 36–46)
HGB BLD-MCNC: 12.5 G/DL (ref 12–16)
MCH RBC QN AUTO: 30.9 PG (ref 26–34)
MCHC RBC AUTO-ENTMCNC: 33.6 G/DL (ref 32–36)
MCV RBC AUTO: 92 FL (ref 80–100)
NRBC BLD-RTO: 0 /100 WBCS (ref 0–0)
PLATELET # BLD AUTO: 766 X10*3/UL (ref 150–450)
RBC # BLD AUTO: 4.04 X10*6/UL (ref 4–5.2)
WBC # BLD AUTO: 17.2 X10*3/UL (ref 4.4–11.3)

## 2024-05-01 PROCEDURE — 74177 CT ABD & PELVIS W/CONTRAST: CPT | Performed by: RADIOLOGY

## 2024-05-01 PROCEDURE — 2500000004 HC RX 250 GENERAL PHARMACY W/ HCPCS (ALT 636 FOR OP/ED): Performed by: INTERNAL MEDICINE

## 2024-05-01 PROCEDURE — A9537 TC99M MEBROFENIN: HCPCS | Performed by: SURGERY

## 2024-05-01 PROCEDURE — 3430000001 HC RX 343 DIAGNOSTIC RADIOPHARMACEUTICALS: Performed by: SURGERY

## 2024-05-01 PROCEDURE — 78226 HEPATOBILIARY SYSTEM IMAGING: CPT | Performed by: RADIOLOGY

## 2024-05-01 PROCEDURE — 2500000001 HC RX 250 WO HCPCS SELF ADMINISTERED DRUGS (ALT 637 FOR MEDICARE OP): Performed by: INTERNAL MEDICINE

## 2024-05-01 PROCEDURE — 2500000004 HC RX 250 GENERAL PHARMACY W/ HCPCS (ALT 636 FOR OP/ED): Performed by: STUDENT IN AN ORGANIZED HEALTH CARE EDUCATION/TRAINING PROGRAM

## 2024-05-01 PROCEDURE — 74177 CT ABD & PELVIS W/CONTRAST: CPT

## 2024-05-01 PROCEDURE — 2550000001 HC RX 255 CONTRASTS: Performed by: SURGERY

## 2024-05-01 PROCEDURE — 2500000001 HC RX 250 WO HCPCS SELF ADMINISTERED DRUGS (ALT 637 FOR MEDICARE OP): Performed by: STUDENT IN AN ORGANIZED HEALTH CARE EDUCATION/TRAINING PROGRAM

## 2024-05-01 PROCEDURE — 2500000005 HC RX 250 GENERAL PHARMACY W/O HCPCS: Performed by: STUDENT IN AN ORGANIZED HEALTH CARE EDUCATION/TRAINING PROGRAM

## 2024-05-01 PROCEDURE — 2500000006 HC RX 250 W HCPCS SELF ADMINISTERED DRUGS (ALT 637 FOR ALL PAYERS): Performed by: STUDENT IN AN ORGANIZED HEALTH CARE EDUCATION/TRAINING PROGRAM

## 2024-05-01 PROCEDURE — 1100000001 HC PRIVATE ROOM DAILY

## 2024-05-01 PROCEDURE — 85027 COMPLETE CBC AUTOMATED: CPT | Performed by: INTERNAL MEDICINE

## 2024-05-01 PROCEDURE — 78227 HEPATOBIL SYST IMAGE W/DRUG: CPT

## 2024-05-01 RX ORDER — KIT FOR THE PREPARATION OF TECHNETIUM TC 99M MEBROFENIN 45 MG/10ML
5.3 INJECTION, POWDER, LYOPHILIZED, FOR SOLUTION INTRAVENOUS
Status: COMPLETED | OUTPATIENT
Start: 2024-05-01 | End: 2024-05-01

## 2024-05-01 RX ORDER — LIDOCAINE HYDROCHLORIDE AND EPINEPHRINE 10; 10 MG/ML; UG/ML
20 INJECTION, SOLUTION INFILTRATION; PERINEURAL ONCE
Status: DISCONTINUED | OUTPATIENT
Start: 2024-05-01 | End: 2024-05-02 | Stop reason: HOSPADM

## 2024-05-01 RX ADMIN — CHOLECALCIFEROL TAB 25 MCG (1000 UNIT) 1000 UNITS: 25 TAB at 07:57

## 2024-05-01 RX ADMIN — LEVOTHYROXINE SODIUM 50 MCG: 50 TABLET ORAL at 07:57

## 2024-05-01 RX ADMIN — Medication 25 MG: at 07:57

## 2024-05-01 RX ADMIN — GABAPENTIN 200 MG: 100 CAPSULE ORAL at 21:42

## 2024-05-01 RX ADMIN — LIDOCAINE 4% 1 PATCH: 40 PATCH TOPICAL at 10:21

## 2024-05-01 RX ADMIN — FLUCONAZOLE 200 MG: 100 TABLET ORAL at 07:57

## 2024-05-01 RX ADMIN — PANTOPRAZOLE SODIUM 40 MG: 40 TABLET, DELAYED RELEASE ORAL at 06:20

## 2024-05-01 RX ADMIN — BUPROPION HYDROCHLORIDE 150 MG: 150 TABLET, FILM COATED, EXTENDED RELEASE ORAL at 10:22

## 2024-05-01 RX ADMIN — PIPERACILLIN SODIUM AND TAZOBACTAM SODIUM 3.38 G: 3; .375 INJECTION, SOLUTION INTRAVENOUS at 10:21

## 2024-05-01 RX ADMIN — GABAPENTIN 200 MG: 100 CAPSULE ORAL at 14:51

## 2024-05-01 RX ADMIN — GABAPENTIN 200 MG: 100 CAPSULE ORAL at 06:20

## 2024-05-01 RX ADMIN — VITAM B12 100 MCG: 100 TAB at 07:58

## 2024-05-01 RX ADMIN — KIT FOR THE PREPARATION OF TECHNETIUM TC 99M MEBROFENIN 5.3 MILLICURIE: 45 INJECTION, POWDER, LYOPHILIZED, FOR SOLUTION INTRAVENOUS at 07:58

## 2024-05-01 RX ADMIN — HEPARIN SODIUM 5000 UNITS: 5000 INJECTION INTRAVENOUS; SUBCUTANEOUS at 06:20

## 2024-05-01 RX ADMIN — ACETAMINOPHEN 975 MG: 325 TABLET ORAL at 07:55

## 2024-05-01 RX ADMIN — PIPERACILLIN SODIUM AND TAZOBACTAM SODIUM 3.38 G: 3; .375 INJECTION, SOLUTION INTRAVENOUS at 14:51

## 2024-05-01 RX ADMIN — IOHEXOL 75 ML: 350 INJECTION, SOLUTION INTRAVENOUS at 15:58

## 2024-05-01 RX ADMIN — PIPERACILLIN SODIUM AND TAZOBACTAM SODIUM 3.38 G: 3; .375 INJECTION, SOLUTION INTRAVENOUS at 21:42

## 2024-05-01 RX ADMIN — DAPTOMYCIN 450 MG: 500 INJECTION, POWDER, LYOPHILIZED, FOR SOLUTION INTRAVENOUS at 17:40

## 2024-05-01 RX ADMIN — SERTRALINE HYDROCHLORIDE 100 MG: 100 TABLET ORAL at 07:56

## 2024-05-01 RX ADMIN — ACETAMINOPHEN 975 MG: 325 TABLET ORAL at 03:09

## 2024-05-01 RX ADMIN — BUPROPION HYDROCHLORIDE 300 MG: 150 TABLET, EXTENDED RELEASE ORAL at 07:57

## 2024-05-01 RX ADMIN — TRAZODONE HYDROCHLORIDE 100 MG: 50 TABLET ORAL at 21:43

## 2024-05-01 RX ADMIN — Medication 5 MG: at 21:43

## 2024-05-01 RX ADMIN — PIPERACILLIN SODIUM AND TAZOBACTAM SODIUM 3.38 G: 3; .375 INJECTION, SOLUTION INTRAVENOUS at 03:09

## 2024-05-01 RX ADMIN — ACETAMINOPHEN 975 MG: 325 TABLET ORAL at 21:43

## 2024-05-01 RX ADMIN — HEPARIN SODIUM 5000 UNITS: 5000 INJECTION INTRAVENOUS; SUBCUTANEOUS at 14:52

## 2024-05-01 RX ADMIN — POLYETHYLENE GLYCOL 3350 17 G: 17 POWDER, FOR SOLUTION ORAL at 10:21

## 2024-05-01 RX ADMIN — HEPARIN SODIUM 5000 UNITS: 5000 INJECTION INTRAVENOUS; SUBCUTANEOUS at 21:42

## 2024-05-01 ASSESSMENT — PAIN SCALES - GENERAL
PAINLEVEL_OUTOF10: 0 - NO PAIN
PAINLEVEL_OUTOF10: 1

## 2024-05-01 ASSESSMENT — PAIN - FUNCTIONAL ASSESSMENT: PAIN_FUNCTIONAL_ASSESSMENT: 0-10

## 2024-05-01 NOTE — PROGRESS NOTES
"Mandy Ramirez is a 63 y.o. female on day 14 of admission presenting with Cholecystitis.    Subjective   Patient's WBC is decreasing. She is tolerating her diet without issues. No fever/chills. Still on IV zosyn and daptomycin with diflucan.        Objective     Physical Exam  Vitals reviewed.   Constitutional:       Appearance: Normal appearance. She is obese.   HENT:      Head: Normocephalic and atraumatic.      Nose: Nose normal.      Mouth/Throat:      Mouth: Mucous membranes are moist.   Eyes:      Extraocular Movements: Extraocular movements intact.      Pupils: Pupils are equal, round, and reactive to light.   Cardiovascular:      Rate and Rhythm: Normal rate and regular rhythm.   Pulmonary:      Effort: Pulmonary effort is normal.   Abdominal:      Palpations: Abdomen is soft.      Tenderness: There is no incisional tenderness except at drain site.     Comments: Incisions c/d/i   Musculoskeletal:         General: Normal range of motion.      Cervical back: Normal range of motion and neck supple. No edema lower extremity  Skin:     General: Skin is warm and dry.      Capillary Refill: Capillary refill takes less than 2 seconds.   Neurological:      General: No focal deficit present.      Mental Status: She is alert.   Psychiatric:         Mood and Affect: Mood normal.         Behavior: Behavior normal.         Thought Content: Thought content normal.         Judgment: Judgment normal.      Drain: bulb has cloudy output ~10cc/24h     Induration and cellulitis around drain site stable. No crepitus. Packing removed with minimal amount of purulence expressed and replaced with new wet to dry dressing.    Last Recorded Vitals  Blood pressure 107/86, pulse 93, temperature 36.9 °C (98.4 °F), resp. rate 18, height 1.727 m (5' 8\"), weight 97.1 kg (214 lb), SpO2 97%.  Intake/Output last 3 Shifts:  I/O last 3 completed shifts:  In: 450 (4.6 mL/kg) [P.O.:350; IV Piggyback:100]  Out: 10 (0.1 mL/kg) [Drains:10]  Weight: " 97.1 kg     Relevant Results           Results for orders placed or performed during the hospital encounter of 04/17/24 (from the past 24 hour(s))   CBC   Result Value Ref Range    WBC 17.2 (H) 4.4 - 11.3 x10*3/uL    nRBC 0.0 0.0 - 0.0 /100 WBCs    RBC 4.04 4.00 - 5.20 x10*6/uL    Hemoglobin 12.5 12.0 - 16.0 g/dL    Hematocrit 37.2 36.0 - 46.0 %    MCV 92 80 - 100 fL    MCH 30.9 26.0 - 34.0 pg    MCHC 33.6 32.0 - 36.0 g/dL    RDW 14.4 11.5 - 14.5 %    Platelets 766 (H) 150 - 450 x10*3/uL          This patient has a central line   Reason for the central line remaining today? Parenteral medication                 Assessment/Plan   Active Problems:    Choledocholithiasis with acute cholecystitis    Gallstones    63F w/ h/o RYGB s/p lap subtotal cholecystectomy with cholangiogram and ERCP through gastric remnant for CHD stone admitted for further management.   She has uptrending leukocytosis without change in vitals or abdominal exam. UA, CXR and blood cultures are clean so far.   CT suspicious for developing abscess/biloma, drain in appropriate position. ID consulted: IV dapto and zosyn   Repeat CT 4/26: suspicious for subcutaneous abscess and intraabdominal abscesses. Diflucan added for positive candida from drain cultures. Bedside I&D done 4/27: hematoma with tinge of purulence and debris. IR aspirated intraabdominal abscess ~5cc.      -Keep drain in place.   -Reassured patient that left sided incisions all look clean and non infected.   -Pain control - scheduled tylenol, oxy q4h, toradol. Lidocaine patch for back. Gabapentin. Melatonin at night.   -Awaiting cultures and further WBC downtrend  -HIDA and repeat CT today.  -Repeat morning labs tomorrow   -Home health care initiated  -SCDs, SQH, ambulation    Jenny Junior MD

## 2024-05-01 NOTE — PROGRESS NOTES
"Infectious disease progress note  Subjective   Abdominal abscess  Leukocytosis  Recent cholecystectomy       Antibiotics  Daptomycin day 7  Zosyn day 7  Diflucan day  Objective   Range of Vitals (last 24 hours)  Heart Rate:  [79-95]   Temp:  [35.7 °C (96.3 °F)-36.3 °C (97.3 °F)]   Resp:  [16-18]   BP: (109-137)/(79-84)   SpO2:  [95 %-96 %]   Daily Weight  04/17/24 : 97.1 kg (214 lb)    Body mass index is 32.54 kg/m².      Physical Exam  HEENT PERRLA  Chest fair entry bilaterally  CVS S1-S2 regular  Abdomen drain in place draining purulent drainage  Extremities no pitting edema      Relevant Results  Labs  Lab Results   Component Value Date    WBC 17.2 (H) 05/01/2024    HGB 12.5 05/01/2024    HCT 37.2 05/01/2024    MCV 92 05/01/2024     (H) 05/01/2024     Lab Results   Component Value Date    GLUCOSE 97 04/30/2024    CALCIUM 9.0 04/30/2024     04/30/2024    K 3.8 04/30/2024    CO2 28 04/30/2024     04/30/2024    BUN 9 04/30/2024    CREATININE 0.76 04/30/2024   ESR: --No results found for: \"SEDRATE\"No results found for: \"CRP\"  Lab Results   Component Value Date    ALT 14 04/30/2024    AST 19 04/30/2024    ALKPHOS 69 04/30/2024    BILITOT 0.4 04/30/2024       Microbiology  4- blood cultures no growth at 2 days  4- blood cultures negative to date  4- fluid culture Candida albicans  4-  IR cultures growing Candida albicans  Imaging  4- CT of the abdomen shows a fluid collection with several air locules in the right upper abdomen this is nonspecific may be postsurgical secondary to biloma, bile leak abscess etc.  There is reticulation of the abdominal fat in the right upper abdomen    5-1-2024 HIDA scan shows no evidence of bile leak, nonvisualization of the gallbladder status postcholecystectomy patent biliary tract and outlet    Repeat CT pending     Assessment/Plan   1.  Continue antibiotics and antifungals  2.  White count is trending down  3.  Case discussed with " Dr. Bedoya and he is ordering a repeat CT scan to make sure there is no further abscess that needs draining    Other issues  Thelma-en-Y bypass chronic for patient  Cholecystectomy as above  Hiatal hernia repair    I reviewed and interpreted all lab test imaging studies and documentations from other healthcare providers  I am monitoring for antibiotic side effects and toxicity     Maya Dunn MD

## 2024-05-01 NOTE — PROGRESS NOTES
05/01/24 1112   Discharge Planning   Patient expects to be discharged to: Home with [facility] for IV antibiotics     Spoke to patient via phone to verify d/c plans. Patient verified that [facility] with IV antibiotics at home was the plan. Her  is the teachable caregiver. Awaiting final antibiotics to update [facility] infusion. TCC to continue to follow

## 2024-05-01 NOTE — PROGRESS NOTES
"Nutrition Follow Up Assessment:   Nutrition Assessment         Nutrition History:  Energy Intake: Fair 50-75 %  Food and Nutrient History: Pt reports appetite is slightly better, now eating ~75% of her meals but spreads out the meal items throughout the day. Meal intakes since last assessment have been 50% x 5 meals and 100% x 1 meal. Has been drinking the ensure high protein but skipped drinking the ONS for the last 3 days and is NPO today. Saved the ensure supplements to drink later but also wanting to try a different supplement due to being tired of drinking the ensure. Denies N/V. Having BMs.  Food Allergies/Intolerances:   egg  GI Symptoms: None  Oral Problems: None       Anthropometrics:  Height: 172.7 cm (5' 8\")   Weight: 97.1 kg (214 lb)   BMI (Calculated): 32.55  IBW/kg (Dietitian Calculated): 63.6 kg          Weight History:     Weight Change %:  Weight History / % Weight Change: No new wt to assess    Nutrition Focused Physical Exam Findings:  defer: not indicated  Subcutaneous Fat Loss:   Orbital Fat Pads: Defer  Muscle Wasting:  Temporalis: Defer  Edema:  Edema Location: RLE non-pitting edema per nursing assessment  Physical Findings:  Skin: Positive (abdominal incisions, right flank wound per nursing assessment)    Nutrition Significant Labs:    Reviewed   Nutrition Specific Medications:  Reviewed     I/O:   Last BM Date: 04/30/24; Stool Appearance: Loose (04/25/24 1714)    Dietary Orders (From admission, onward)       Start     Ordered    05/01/24 1201  Oral nutritional supplements  Until discontinued        Comments: Chocolate   Question Answer Comment   Deliver with Lunch    Select supplement: Mighty Shake        05/01/24 1201    05/01/24 0001  NPO Diet; Effective midnight  Diet effective midnight         04/30/24 2011                     Estimated Needs:   Total Energy Estimated Needs (kCal): 1590 kCal  Method for Estimating Needs: 25 kcal/kg IBW  Total Protein Estimated Needs (g): 64 g  Method for " Estimating Needs: 1 g/kg IBW  Total Fluid Estimated Needs (mL): 1590 mL  Method for Estimating Needs: 1 ml/kcal or per MD        Nutrition Diagnosis   Malnutrition Diagnosis  Patient has Malnutrition Diagnosis: No    Nutrition Diagnosis  Patient has Nutrition Diagnosis: Yes  Diagnosis Status (1): Resolved  Nutrition Diagnosis 1: Inadequate oral intake  Related to (1): recent surgery, decreased appetite  As Evidenced by (1): meal intakes ranging from 0-50% during this admission  Additional Assessment Information (1): Now eating % of meals and drinking ONS - meeting nutrition needs at this time       Nutrition Interventions/Recommendations         Nutrition Prescription:  Individualized Nutrition Prescription Provided for : Regular diet with ONS        Nutrition Interventions:   Interventions: Meals and snacks, Medical food supplement  Meals and Snacks: General healthful diet  Goal: Consumes 3 meals per day  Medical Food Supplement: Commercial beverage  Goal: Mighty Shake daily (220 kcal and 6 g protein per serving) chocolate         Nutrition Education:   Patient with no diet related questions at this time.         Nutrition Monitoring and Evaluation   Food/Nutrient Related History Monitoring  Monitoring and Evaluation Plan: Energy intake, Amount of food, Fluid intake  Energy Intake: Estimated energy intake  Criteria: Pt meets >75% of estimated energy needs - met with improvement in PO diet and consuming ONS  Fluid Intake: Estimated fluid intake  Criteria: Meets >75% of estimated fluid needs - met  Amount of Food: Estimated amout of food, Medical food intake  Criteria: Pt consumes >75% of meals and supplements - met    Body Composition/Growth/Weight History  Monitoring and Evaluation Plan: Weight  Weight: Measured weight  Criteria: Maintains stable weight - unable to assess, no new data         Nutrition Focused Physical Findings  Monitoring and Evaluation Plan: Skin  Skin: Impaired wound healing  Criteria:  Promote wound healing through adequate nutrition - no change       Time Spent/Follow-up Reminder:   Time Spent (min): 30 minutes  Last Date of Nutrition Visit: 05/01/24  Nutrition Follow-Up Needed?: 3-8 days  Follow up Comment: 5/6-5/9 AC

## 2024-05-01 NOTE — CARE PLAN
The patient's goals for the shift include      The clinical goals for the shift include Pt will have decrease pain during shift      Problem: Pain  Goal: My pain/discomfort is manageable  Outcome: Progressing  Flowsheets (Taken 5/1/2024 0920)  Resident's pain/discomfort is manageable: Offer non-pharmacological pain management interventions     Problem: Safety  Goal: Patient will be injury free during hospitalization  Outcome: Progressing  Goal: I will remain free of falls  Outcome: Progressing  Flowsheets (Taken 5/1/2024 0920)  Resident will remain free of falls: Accompany resident as ordered (ex. 1:1, stand-by assist, dayroom monitoring, 15 minute checks, line of sight)     Problem: Daily Care  Goal: Daily care needs are met  Outcome: Progressing  Flowsheets (Taken 5/1/2024 0920)  Daily care needs are met: Assist patient with activities of daily living as needed     Problem: Psychosocial Needs  Goal: Demonstrates ability to cope with hospitalization/illness  Outcome: Progressing  Flowsheets (Taken 5/1/2024 0920)  Demonstrates ability to cope with hospitalization/illness: Assist resident to identify and practice own strengths and abilities  Goal: Collaborate with me, my family, and caregiver to identify my specific goals  Outcome: Progressing  Flowsheets (Taken 4/29/2024 1900 by Swetha Lennon, RN)  Cultural Requests During Hospitalization: none  Spiritual Requests During Hospitalization: none     Problem: Discharge Barriers  Goal: My discharge needs are met  Outcome: Progressing  Flowsheets (Taken 5/1/2024 0920)  Resident's discharge needs are met: Identify potential discharge barriers on admission and throughout stay

## 2024-05-02 ENCOUNTER — APPOINTMENT (OUTPATIENT)
Dept: SURGERY | Facility: CLINIC | Age: 64
End: 2024-05-02
Payer: COMMERCIAL

## 2024-05-02 ENCOUNTER — DOCUMENTATION (OUTPATIENT)
Dept: PHARMACY | Facility: CLINIC | Age: 64
End: 2024-05-02

## 2024-05-02 ENCOUNTER — HOME INFUSION (OUTPATIENT)
Dept: INFUSION THERAPY | Age: 64
End: 2024-05-02

## 2024-05-02 VITALS
BODY MASS INDEX: 32.43 KG/M2 | HEART RATE: 86 BPM | SYSTOLIC BLOOD PRESSURE: 122 MMHG | RESPIRATION RATE: 18 BRPM | TEMPERATURE: 98.2 F | OXYGEN SATURATION: 96 % | HEIGHT: 68 IN | DIASTOLIC BLOOD PRESSURE: 84 MMHG | WEIGHT: 214 LBS

## 2024-05-02 LAB
ALBUMIN SERPL BCP-MCNC: 3.5 G/DL (ref 3.4–5)
ALP SERPL-CCNC: 63 U/L (ref 33–136)
ALT SERPL W P-5'-P-CCNC: 15 U/L (ref 7–45)
ANION GAP SERPL CALC-SCNC: 12 MMOL/L (ref 10–20)
AST SERPL W P-5'-P-CCNC: 21 U/L (ref 9–39)
BACTERIA FLD CULT: ABNORMAL
BACTERIA FLD CULT: ABNORMAL
BASOPHILS # BLD AUTO: 0.1 X10*3/UL (ref 0–0.1)
BASOPHILS NFR BLD AUTO: 0.7 %
BILIRUB SERPL-MCNC: 0.4 MG/DL (ref 0–1.2)
BUN SERPL-MCNC: 10 MG/DL (ref 6–23)
CALCIUM SERPL-MCNC: 9.1 MG/DL (ref 8.6–10.3)
CHLORIDE SERPL-SCNC: 102 MMOL/L (ref 98–107)
CO2 SERPL-SCNC: 29 MMOL/L (ref 21–32)
CREAT SERPL-MCNC: 0.77 MG/DL (ref 0.5–1.05)
EGFRCR SERPLBLD CKD-EPI 2021: 86 ML/MIN/1.73M*2
EOSINOPHIL # BLD AUTO: 0.91 X10*3/UL (ref 0–0.7)
EOSINOPHIL NFR BLD AUTO: 6.6 %
ERYTHROCYTE [DISTWIDTH] IN BLOOD BY AUTOMATED COUNT: 14.5 % (ref 11.5–14.5)
GLUCOSE SERPL-MCNC: 101 MG/DL (ref 74–99)
GRAM STN SPEC: ABNORMAL
HCT VFR BLD AUTO: 38 % (ref 36–46)
HGB BLD-MCNC: 12.4 G/DL (ref 12–16)
IMM GRANULOCYTES # BLD AUTO: 0.37 X10*3/UL (ref 0–0.7)
IMM GRANULOCYTES NFR BLD AUTO: 2.7 % (ref 0–0.9)
LYMPHOCYTES # BLD AUTO: 3.21 X10*3/UL (ref 1.2–4.8)
LYMPHOCYTES NFR BLD AUTO: 23.2 %
MCH RBC QN AUTO: 30.6 PG (ref 26–34)
MCHC RBC AUTO-ENTMCNC: 32.6 G/DL (ref 32–36)
MCV RBC AUTO: 94 FL (ref 80–100)
MONOCYTES # BLD AUTO: 0.97 X10*3/UL (ref 0.1–1)
MONOCYTES NFR BLD AUTO: 7 %
NEUTROPHILS # BLD AUTO: 8.25 X10*3/UL (ref 1.2–7.7)
NEUTROPHILS NFR BLD AUTO: 59.8 %
NRBC BLD-RTO: 0 /100 WBCS (ref 0–0)
PLATELET # BLD AUTO: 749 X10*3/UL (ref 150–450)
POTASSIUM SERPL-SCNC: 4 MMOL/L (ref 3.5–5.3)
PROT SERPL-MCNC: 6.6 G/DL (ref 6.4–8.2)
RBC # BLD AUTO: 4.05 X10*6/UL (ref 4–5.2)
SODIUM SERPL-SCNC: 139 MMOL/L (ref 136–145)
WBC # BLD AUTO: 13.8 X10*3/UL (ref 4.4–11.3)

## 2024-05-02 PROCEDURE — 2500000005 HC RX 250 GENERAL PHARMACY W/O HCPCS: Performed by: STUDENT IN AN ORGANIZED HEALTH CARE EDUCATION/TRAINING PROGRAM

## 2024-05-02 PROCEDURE — 2500000004 HC RX 250 GENERAL PHARMACY W/ HCPCS (ALT 636 FOR OP/ED): Performed by: STUDENT IN AN ORGANIZED HEALTH CARE EDUCATION/TRAINING PROGRAM

## 2024-05-02 PROCEDURE — 2500000001 HC RX 250 WO HCPCS SELF ADMINISTERED DRUGS (ALT 637 FOR MEDICARE OP): Performed by: INTERNAL MEDICINE

## 2024-05-02 PROCEDURE — 2500000006 HC RX 250 W HCPCS SELF ADMINISTERED DRUGS (ALT 637 FOR ALL PAYERS): Performed by: STUDENT IN AN ORGANIZED HEALTH CARE EDUCATION/TRAINING PROGRAM

## 2024-05-02 PROCEDURE — 85025 COMPLETE CBC W/AUTO DIFF WBC: CPT | Performed by: STUDENT IN AN ORGANIZED HEALTH CARE EDUCATION/TRAINING PROGRAM

## 2024-05-02 PROCEDURE — 2500000004 HC RX 250 GENERAL PHARMACY W/ HCPCS (ALT 636 FOR OP/ED): Performed by: INTERNAL MEDICINE

## 2024-05-02 PROCEDURE — 80053 COMPREHEN METABOLIC PANEL: CPT | Performed by: STUDENT IN AN ORGANIZED HEALTH CARE EDUCATION/TRAINING PROGRAM

## 2024-05-02 PROCEDURE — 2500000001 HC RX 250 WO HCPCS SELF ADMINISTERED DRUGS (ALT 637 FOR MEDICARE OP): Performed by: STUDENT IN AN ORGANIZED HEALTH CARE EDUCATION/TRAINING PROGRAM

## 2024-05-02 RX ADMIN — ACETAMINOPHEN 975 MG: 325 TABLET ORAL at 08:00

## 2024-05-02 RX ADMIN — BUPROPION HYDROCHLORIDE 150 MG: 150 TABLET, FILM COATED, EXTENDED RELEASE ORAL at 08:01

## 2024-05-02 RX ADMIN — FLUCONAZOLE 200 MG: 100 TABLET ORAL at 08:00

## 2024-05-02 RX ADMIN — DAPTOMYCIN 450 MG: 500 INJECTION, POWDER, LYOPHILIZED, FOR SOLUTION INTRAVENOUS at 08:59

## 2024-05-02 RX ADMIN — LIDOCAINE 4% 1 PATCH: 40 PATCH TOPICAL at 08:00

## 2024-05-02 RX ADMIN — SERTRALINE HYDROCHLORIDE 100 MG: 100 TABLET ORAL at 08:00

## 2024-05-02 RX ADMIN — HEPARIN SODIUM 5000 UNITS: 5000 INJECTION INTRAVENOUS; SUBCUTANEOUS at 13:20

## 2024-05-02 RX ADMIN — PIPERACILLIN SODIUM AND TAZOBACTAM SODIUM 3.38 G: 3; .375 INJECTION, SOLUTION INTRAVENOUS at 02:46

## 2024-05-02 RX ADMIN — BUPROPION HYDROCHLORIDE 300 MG: 150 TABLET, EXTENDED RELEASE ORAL at 08:00

## 2024-05-02 RX ADMIN — POLYETHYLENE GLYCOL 3350 17 G: 17 POWDER, FOR SOLUTION ORAL at 08:04

## 2024-05-02 RX ADMIN — PANTOPRAZOLE SODIUM 40 MG: 40 TABLET, DELAYED RELEASE ORAL at 06:12

## 2024-05-02 RX ADMIN — PIPERACILLIN SODIUM AND TAZOBACTAM SODIUM 3.38 G: 3; .375 INJECTION, SOLUTION INTRAVENOUS at 08:01

## 2024-05-02 RX ADMIN — LEVOTHYROXINE SODIUM 50 MCG: 50 TABLET ORAL at 08:00

## 2024-05-02 RX ADMIN — PIPERACILLIN SODIUM AND TAZOBACTAM SODIUM 3.38 G: 3; .375 INJECTION, SOLUTION INTRAVENOUS at 17:53

## 2024-05-02 RX ADMIN — GABAPENTIN 200 MG: 100 CAPSULE ORAL at 06:12

## 2024-05-02 RX ADMIN — VITAM B12 100 MCG: 100 TAB at 08:00

## 2024-05-02 RX ADMIN — HEPARIN SODIUM 5000 UNITS: 5000 INJECTION INTRAVENOUS; SUBCUTANEOUS at 06:13

## 2024-05-02 RX ADMIN — GABAPENTIN 200 MG: 100 CAPSULE ORAL at 13:20

## 2024-05-02 RX ADMIN — ACETAMINOPHEN 975 MG: 325 TABLET ORAL at 15:39

## 2024-05-02 RX ADMIN — Medication 25 MG: at 08:01

## 2024-05-02 RX ADMIN — CHOLECALCIFEROL TAB 25 MCG (1000 UNIT) 1000 UNITS: 25 TAB at 08:00

## 2024-05-02 RX ADMIN — ACETAMINOPHEN 975 MG: 325 TABLET ORAL at 02:46

## 2024-05-02 RX ADMIN — PIPERACILLIN SODIUM AND TAZOBACTAM SODIUM 3.38 G: 3; .375 INJECTION, SOLUTION INTRAVENOUS at 13:17

## 2024-05-02 ASSESSMENT — PAIN - FUNCTIONAL ASSESSMENT: PAIN_FUNCTIONAL_ASSESSMENT: 0-10

## 2024-05-02 ASSESSMENT — PAIN SCALES - GENERAL: PAINLEVEL_OUTOF10: 0 - NO PAIN

## 2024-05-02 NOTE — PROGRESS NOTES
Mandy Ramirez is receiving zosyn and daptomycin thru 5/15 (confirmed with Dr Dunn) for abdominal abscess.     Pt's discharge had been held from Fri 4/26 until 5/2 and pt now medically ready to leave. SOC confirmed by nursing as 5/3 AM, pt wanting to leave ASAP at time of this writing.    RX contacted pt to confirm last week's delivery of medication was still in refrigerator. Pt confirmed this, and verbalized understanding that meds would likely only be good thru Sun 5/5 based on expiration. She is agreeable to another delivery of 5 days of meds on Fri 5/3 and will start this supply on Mon 5/6.    Dispensing 5/3 with supplies to match   5x Zosyn 24-hr bags  5x dapto syringe  DOS 5/6-5/10    Follow up 5/9 check labs/prog, send RMDR OVN

## 2024-05-02 NOTE — DISCHARGE SUMMARY
Discharge Diagnosis  Cholecystitis    Issues Requiring Follow-Up  Postoperative appointment    Test Results Pending At Discharge  Pending Labs       Order Current Status    Extra Urine Gray Tube Collected (04/24/24 1222)    Urinalysis with Reflex Culture and Microscopic In process            Hospital Course     63F w/ h/o RYGB s/p lap subtotal cholecystectomy with cholangiogram and ERCP through gastric remnant for CHD stone admitted for further management.   She had uptrending leukocytosis without change in vitals or abdominal exam. UA, CXR and blood cultures were clean.  CT suspicious for developing abscess/biloma, drain in appropriate position. ID consulted: IV dapto and zosyn   Repeat CT 4/26: suspicious for subcutaneous abscess and intraabdominal abscesses. Diflucan added for positive candida from drain cultures. Bedside I&D done 4/27: hematoma with tinge of purulence and debris. IR aspirated intraabdominal abscess ~5cc. Leukocytosis improved and repeat CT on 5/1 and HIDA were negative for worsening pathology. HIDA was negative and CT showed marked improvement of fluid collections. She is going to be discharged with home health.    Pertinent Physical Exam At Time of Discharge  Physical Exam  Vitals reviewed.   Constitutional:       Appearance: Normal appearance. She is obese.   HENT:      Head: Normocephalic and atraumatic.      Nose: Nose normal.      Mouth/Throat:      Mouth: Mucous membranes are moist.   Eyes:      Extraocular Movements: Extraocular movements intact.      Pupils: Pupils are equal, round, and reactive to light.   Cardiovascular:      Rate and Rhythm: Normal rate and regular rhythm.   Pulmonary:      Effort: Pulmonary effort is normal.   Abdominal:      Palpations: Abdomen is soft.      Tenderness: There is no incisional tenderness except at drain site.     Comments: Incisions c/d/i   Musculoskeletal:         General: Normal range of motion.      Cervical back: Normal range of motion and neck  supple. No edema lower extremity  Skin:     General: Skin is warm and dry.      Capillary Refill: Capillary refill takes less than 2 seconds.   Neurological:      General: No focal deficit present.      Mental Status: She is alert.   Psychiatric:         Mood and Affect: Mood normal.         Behavior: Behavior normal.         Thought Content: Thought content normal.         Judgment: Judgment normal.      Drain: bulb has cloudy output ~10cc/24h     Induration and cellulitis around drain site stable. No crepitus. Packing removed with minimal amount of purulence expressed and replaced with new wet to dry dressing.    Home Medications     Medication List      START taking these medications     DAPTOmycin 500 mg injection; Commonly known as: Cubicin; Infuse 450 mg   over 30 minutes into a venous catheter once daily for 28 days. Q. weekly   labs CBC, CK, BMP fax to [phone]   piperacillin-tazobactam 3.375 gram injection; Commonly known as: Zosyn;   Infuse 3.375 g into a venous catheter every 6 hours for 28 days.     CONTINUE taking these medications     * amphetamine-dextroamphetamine 10 mg tablet; Commonly known as:   Adderall; Take 1 tablet (10 mg) by mouth 2 times a day.   * amphetamine-dextroamphetamine 10 mg tablet; Commonly known as:   Adderall; Take 1 tablet (10 mg) by mouth 2 times a day. Do not start   before May 12, 2024.; Start taking on: May 12, 2024   Biotene Dry Mouth Oral Rinse solution; Generic drug: moisturizing mouth   * buPROPion  mg 24 hr tablet; Commonly known as: Wellbutrin XL;   TAKE 1 TABLET BY MOUTH EVERY DAY   * buPROPion  mg 24 hr tablet; Commonly known as: Wellbutrin XL;   TAKE 1 TABLET BY MOUTH EVERY DAY   cholecalciferol 25 MCG (1000 UT) capsule; Commonly known as: Vitamin D-3   cloNIDine 0.2 mg tablet; Commonly known as: Catapres; Take 1 tablet (0.2   mg) by mouth once daily at bedtime.   cyanocobalamin 100 mcg tablet; Commonly known as: Vitamin B-12   ferrous fumarate-vitamin  C; Commonly known as: Tamar-[place] 65-25   Fish OiL 1,000 mg (120 mg-180 mg) capsule; Generic drug: omega   3-dha-epa-fish oil   fluoride (sodium) 1.1 % gel   Gaviscon Extra Strength 160-105 mg tablet,chewable; Generic drug:   aluminum hydrox-magnesium carb   levothyroxine 50 mcg tablet; Commonly known as: Synthroid, Levoxyl   omeprazole 40 mg DR capsule; Commonly known as: PriLOSEC; Take 1 capsule   (40 mg) by mouth once daily in the morning. Take before meals. Do not   crush or chew. Open capsule, sprinkle beads on SF jello, pudding or   applesauce.   ondansetron ODT 4 mg disintegrating tablet; Commonly known as:   Zofran-ODT; Take 1 tablet (4 mg) by mouth every 6 hours if needed for   nausea or vomiting.   pyridoxine 25 mg tablet; Commonly known as: Vitamin B-6   sertraline 100 mg tablet; Commonly known as: Zoloft; Take 1 tablet (100   mg) by mouth once daily.   traZODone 100 mg tablet; Commonly known as: Desyrel; TAKE 1 TABLET   BEDTIME AS DIRECTED (MAY TAKE UP TO 2 TABS AT BEDTIME FOR SLEEP)  * This list has 4 medication(s) that are the same as other medications   prescribed for you. Read the directions carefully, and ask your doctor or   other care provider to review them with you.     ASK your doctor about these medications     alteplase 2 mg injection; Commonly known as: Cathflo Activase; 2 mL (2   mg) by intra-catheter route 1 time for 1 dose.; Ask about: Should I take   this medication?       Outpatient Follow-Up  Future Appointments   Date Time Provider Department [place]   5/9/2024  8:30 AM Denise Bedoya MD [id number] [facility]   5/14/2024  2:00 PM Jennifer Tai RD [id number] [facility]   6/26/2024  1:30 PM YUDITH Khan-CNP [id number] [facility]       Jenny Junior MD

## 2024-05-02 NOTE — CARE PLAN
Problem: Pain  Goal: My pain/discomfort is manageable  5/2/2024 1755 by Ethel Sweeney RN  Outcome: Met  5/2/2024 0923 by Ethel Sweeney RN  Outcome: Progressing     Problem: Safety  Goal: Patient will be injury free during hospitalization  5/2/2024 1755 by Ethel Sweeney RN  Outcome: Met  5/2/2024 0923 by Ethel Sweeney RN  Outcome: Progressing  Goal: I will remain free of falls  5/2/2024 1755 by Ethel Sweeney RN  Outcome: Met  5/2/2024 0923 by Ethel Sweeney RN  Outcome: Progressing     Problem: Daily Care  Goal: Daily care needs are met  5/2/2024 1755 by Ethel Sweeney RN  Outcome: Met  5/2/2024 0923 by Ethel Sweeney RN  Outcome: Progressing     Problem: Psychosocial Needs  Goal: Demonstrates ability to cope with hospitalization/illness  5/2/2024 1755 by Ethel Sweeney RN  Outcome: Met  5/2/2024 0923 by Ethel Sweeney RN  Outcome: Progressing  Goal: Collaborate with me, my family, and caregiver to identify my specific goals  5/2/2024 1755 by Ethel Sweeney RN  Outcome: Met  5/2/2024 0923 by Ethel Sweeney RN  Outcome: Progressing     Problem: Discharge Barriers  Goal: My discharge needs are met  5/2/2024 1755 by Ethel Sweeney RN  Outcome: Met  5/2/2024 0923 by Ethel Sweeney RN  Outcome: Progressing

## 2024-05-02 NOTE — PROGRESS NOTES
"Infectious disease progress note  Subjective   Abdominal abscess  Leukocytosis  Recent cholecystectomy    Antibiotics  Daptomycin day 8  Zosyn day 8  Diflucan day    Objective   Range of Vitals (last 24 hours)  Heart Rate:  [74-93]   Temp:  [35.7 °C (96.3 °F)-36.9 °C (98.4 °F)]   Resp:  [16-18]   BP: (107-136)/(73-86)   SpO2:  [93 %-97 %]   Daily Weight  04/17/24 : 97.1 kg (214 lb)    Body mass index is 32.54 kg/m².      Physical Exam  HEENT PERRLA  Chest fair entry bilaterally  CVS S1-S2 regular  Abdomen drain in place draining purulent drainage  Extremities no pitting edema    Relevant Results  Labs  Lab Results   Component Value Date    WBC 13.8 (H) 05/02/2024    HGB 12.4 05/02/2024    HCT 38.0 05/02/2024    MCV 94 05/02/2024     (H) 05/02/2024     Lab Results   Component Value Date    GLUCOSE 101 (H) 05/02/2024    CALCIUM 9.1 05/02/2024     05/02/2024    K 4.0 05/02/2024    CO2 29 05/02/2024     05/02/2024    BUN 10 05/02/2024    CREATININE 0.46 (L) 05/02/2024   ESR: --No results found for: \"SEDRATE\"No results found for: \"CRP\"  Lab Results   Component Value Date    ALT 15 05/02/2024    AST 21 05/02/2024    ALKPHOS 63 05/02/2024    BILITOT 0.4 05/02/2024       Microbiology  4- blood cultures no growth at 2 days  4- blood cultures negative to date  4- fluid culture Candida albicans  4-  IR cultures growing Candida albicans    Imaging  4- CT of the abdomen shows a fluid collection with several air locules in the right upper abdomen this is nonspecific may be postsurgical secondary to biloma, bile leak abscess etc.  There is reticulation of the abdominal fat in the right upper abdomen     5-1-2024 HIDA scan shows no evidence of bile leak, nonvisualization of the gallbladder status postcholecystectomy patent biliary tract and outlet     Repeat CT pending     Assessment/Plan   1.  Continue antibiotics daptomycin and Zosyn for 13 more days.  The home care pharmacy " Last medication refill 10/31/22    Last lab visit 11/04/22    Recent Visits  Date Type Provider Dept   11/04/22 Office Visit Kolton Watkins MD  Internal Medicine   10/20/21 Office Visit Kolton Watkins MD  Internal Medicine   06/24/21 Office Visit Kolton Watkins MD  Internal Medicine   Showing recent visits within past 540 days with a meds authorizing provider and meeting all other requirements  Future Appointments  No visits were found meeting these conditions.  Showing future appointments within next 150 days with a meds authorizing provider and meeting all other requirements     has these orders along with weekly labs  2.  I have written an order for Diflucan 200 mg p.o. daily for 13 more days and will place on the chart  3.  Patient to follow-up with me in 2 to 3 weeks at my office    Case was discussed with Dr. Bedoya in detail  Other issues  Thelma-en-Y bypass chronic for patient  Cholecystectomy as above  Hiatal hernia repair    I reviewed and interpreted all lab test imaging studies and documentations from other healthcare providers  I am monitoring for antibiotic side effects and toxicity     Maya Dunn MD

## 2024-05-02 NOTE — CARE PLAN
The patient's goals for the shift include  comfort and safety    The clinical goals for the shift include Pt will remain comfortable and safe

## 2024-05-02 NOTE — PROGRESS NOTES
05/02/24 1140   Discharge Planning   Patient expects to be discharged to: PICC line in Place will continue same IVAB as in the hospital . [facility] to manage arrangements. Waiting on SOC date and time.     1315 Plan is to  be discharged tonight  after 1800 ( to be given early) dose of Zosyn.  [facility] will send a nurse tonight if able. If they can not, [facility] will see in the a.m. for SOC.   1335 called patient and informed of plan for [facility] in the early am. Gave her both [facility] and  Home infusion number  to call if any problems or questions.

## 2024-05-03 ENCOUNTER — HOME CARE VISIT (OUTPATIENT)
Dept: HOME HEALTH SERVICES | Facility: HOME HEALTH | Age: 64
End: 2024-05-03
Payer: COMMERCIAL

## 2024-05-03 VITALS — RESPIRATION RATE: 20 BRPM

## 2024-05-03 DIAGNOSIS — L76.82 INCISIONAL PAIN: Primary | ICD-10-CM

## 2024-05-03 PROCEDURE — G0151 HHCP-SERV OF PT,EA 15 MIN: HCPCS

## 2024-05-03 PROCEDURE — G0299 HHS/HOSPICE OF RN EA 15 MIN: HCPCS

## 2024-05-03 PROCEDURE — 0023 HH SOC

## 2024-05-03 RX ORDER — GABAPENTIN 100 MG/1
200 CAPSULE ORAL 3 TIMES DAILY PRN
Qty: 180 CAPSULE | Refills: 0 | Status: SHIPPED | OUTPATIENT
Start: 2024-05-03 | End: 2024-06-02

## 2024-05-03 RX ADMIN — SODIUM CHLORIDE 10 ML: 9 INJECTION, SOLUTION INTRAMUSCULAR; INTRAVENOUS; SUBCUTANEOUS at 11:30

## 2024-05-03 RX ADMIN — PIPERACILLIN SODIUM, TAZOBACTAM SODIUM 3.38 G: 3; .375 INJECTION, POWDER, LYOPHILIZED, FOR SOLUTION INTRAVENOUS at 11:28

## 2024-05-03 RX ADMIN — DAPTOMYCIN 450 MG: 50 INJECTION, POWDER, LYOPHILIZED, FOR SOLUTION INTRAVENOUS at 11:27

## 2024-05-03 SDOH — HEALTH STABILITY: PHYSICAL HEALTH: PHYSICAL EXERCISE: SUPINE/SITTING

## 2024-05-03 SDOH — HEALTH STABILITY: PHYSICAL HEALTH: EXERCISE ACTIVITIES SETS: 1

## 2024-05-03 SDOH — HEALTH STABILITY: PHYSICAL HEALTH: PHYSICAL EXERCISE: 3

## 2024-05-03 SDOH — HEALTH STABILITY: PHYSICAL HEALTH: EXERCISE TYPE: SPINAL STRETCH, STRENGTHENING LES

## 2024-05-03 SDOH — HEALTH STABILITY: PHYSICAL HEALTH: EXERCISE ACTIVITY: SUPINE KNEE TO CHEST, ANKLE PUMPS, HAMSTRING AND CALF STRETCH

## 2024-05-03 ASSESSMENT — ENCOUNTER SYMPTOMS
HIGHEST PAIN SEVERITY IN PAST 24 HOURS: 3/10
HIGHEST PAIN SEVERITY IN PAST 24 HOURS: 0/10
PAIN: 1
PAIN SEVERITY GOAL: 0/10
PAIN LOCATION: ABDOMEN
LOWEST PAIN SEVERITY IN PAST 24 HOURS: 3/10
SUBJECTIVE PAIN PROGRESSION: GRADUALLY IMPROVING
PERSON REPORTING PAIN: PATIENT
LOWEST PAIN SEVERITY IN PAST 24 HOURS: 0/10
MUSCLE WEAKNESS: 1
PAIN SEVERITY GOAL: 0/10
APPETITE LEVEL: FAIR
OCCASIONAL FEELINGS OF UNSTEADINESS: 0

## 2024-05-03 ASSESSMENT — ACTIVITIES OF DAILY LIVING (ADL)
OASIS_M1830: 02
ENTERING_EXITING_HOME: NEEDS ASSISTANCE
AMBULATION ASSISTANCE ON FLAT SURFACES: 1
AMBULATION_DISTANCE/DURATION_TOLERATED: 30 FEET X2

## 2024-05-03 ASSESSMENT — PAIN SCALES - PAIN ASSESSMENT IN ADVANCED DEMENTIA (PAINAD): BREATHING: 0

## 2024-05-04 ENCOUNTER — HOME CARE VISIT (OUTPATIENT)
Dept: HOME HEALTH SERVICES | Facility: HOME HEALTH | Age: 64
End: 2024-05-04
Payer: COMMERCIAL

## 2024-05-04 ENCOUNTER — APPOINTMENT (OUTPATIENT)
Dept: HOME HEALTH SERVICES | Facility: HOME HEALTH | Age: 64
End: 2024-05-04
Payer: COMMERCIAL

## 2024-05-04 VITALS
HEART RATE: 92 BPM | OXYGEN SATURATION: 93 % | DIASTOLIC BLOOD PRESSURE: 70 MMHG | SYSTOLIC BLOOD PRESSURE: 120 MMHG | RESPIRATION RATE: 18 BRPM | TEMPERATURE: 97.5 F

## 2024-05-04 PROCEDURE — G0299 HHS/HOSPICE OF RN EA 15 MIN: HCPCS

## 2024-05-04 RX ADMIN — DAPTOMYCIN 450 MG: 50 INJECTION, POWDER, LYOPHILIZED, FOR SOLUTION INTRAVENOUS at 11:02

## 2024-05-04 RX ADMIN — PIPERACILLIN SODIUM, TAZOBACTAM SODIUM 3.38 G: 3; .375 INJECTION, POWDER, LYOPHILIZED, FOR SOLUTION INTRAVENOUS at 11:02

## 2024-05-04 RX ADMIN — HEPARIN 50 UNITS: 100 SYRINGE at 11:02

## 2024-05-04 ASSESSMENT — ENCOUNTER SYMPTOMS
LOWEST PAIN SEVERITY IN PAST 24 HOURS: 0/10
PAIN SEVERITY GOAL: 0/10
HIGHEST PAIN SEVERITY IN PAST 24 HOURS: 0/10
MUSCLE WEAKNESS: 1
APPETITE LEVEL: GOOD

## 2024-05-04 ASSESSMENT — PAIN SCALES - PAIN ASSESSMENT IN ADVANCED DEMENTIA (PAINAD): BREATHING: 0

## 2024-05-05 ENCOUNTER — HOME CARE VISIT (OUTPATIENT)
Dept: HOME HEALTH SERVICES | Facility: HOME HEALTH | Age: 64
End: 2024-05-05
Payer: COMMERCIAL

## 2024-05-06 ENCOUNTER — LAB REQUISITION (OUTPATIENT)
Dept: LAB | Facility: LAB | Age: 64
End: 2024-05-06
Payer: COMMERCIAL

## 2024-05-06 ENCOUNTER — HOME CARE VISIT (OUTPATIENT)
Dept: HOME HEALTH SERVICES | Facility: HOME HEALTH | Age: 64
End: 2024-05-06
Payer: COMMERCIAL

## 2024-05-06 DIAGNOSIS — Z48.815 ENCOUNTER FOR SURGICAL AFTERCARE FOLLOWING SURGERY ON THE DIGESTIVE SYSTEM: ICD-10-CM

## 2024-05-06 PROCEDURE — G0299 HHS/HOSPICE OF RN EA 15 MIN: HCPCS

## 2024-05-06 PROCEDURE — 82550 ASSAY OF CK (CPK): CPT

## 2024-05-06 PROCEDURE — 85027 COMPLETE CBC AUTOMATED: CPT

## 2024-05-06 PROCEDURE — 80048 BASIC METABOLIC PNL TOTAL CA: CPT

## 2024-05-06 RX ADMIN — DAPTOMYCIN 450 MG: 50 INJECTION, POWDER, LYOPHILIZED, FOR SOLUTION INTRAVENOUS at 10:55

## 2024-05-06 RX ADMIN — SODIUM CHLORIDE 10 ML: 9 INJECTION, SOLUTION INTRAMUSCULAR; INTRAVENOUS; SUBCUTANEOUS at 10:55

## 2024-05-06 RX ADMIN — HEPARIN 50 UNITS: 100 SYRINGE at 10:55

## 2024-05-06 SDOH — ECONOMIC STABILITY: FOOD INSECURITY: MEALS PER DAY: 2

## 2024-05-06 ASSESSMENT — PAIN SCALES - PAIN ASSESSMENT IN ADVANCED DEMENTIA (PAINAD)
BODYLANGUAGE: 0
NEGVOCALIZATION: 0 - NONE.
CONSOLABILITY: 0 - NO NEED TO CONSOLE.
TOTALSCORE: 1
FACIALEXPRESSION: 0 - SMILING OR INEXPRESSIVE.
BODYLANGUAGE: 0 - RELAXED.
FACIALEXPRESSION: 0
NEGVOCALIZATION: 0
BREATHING: 1
CONSOLABILITY: 0

## 2024-05-06 ASSESSMENT — ENCOUNTER SYMPTOMS
DEPRESSION: 0
PAIN LOCATION - PAIN FREQUENCY: CONSTANT
APPETITE LEVEL: FAIR
PAIN LOCATION - PAIN QUALITY: TENDER
CHANGE IN APPETITE: UNCHANGED
PAIN LOCATION: ABDOMEN
HIGHEST PAIN SEVERITY IN PAST 24 HOURS: 2/10
LAST BOWEL MOVEMENT: 66966
PAIN: 1
COUGH CHARACTERISTICS: PRODUCTIVE
LOSS OF SENSATION IN FEET: 0
OCCASIONAL FEELINGS OF UNSTEADINESS: 0
PERSON REPORTING PAIN: PATIENT
LOWEST PAIN SEVERITY IN PAST 24 HOURS: 1/10
PAIN LOCATION - PAIN SEVERITY: 2/10
DYSPNEA ACTIVITY LEVEL: AFTER AMBULATING MORE THAN 20 FT
SHORTNESS OF BREATH: 1
COUGH: 1
STOOL FREQUENCY: DAILY
PAIN SEVERITY GOAL: 0/10

## 2024-05-07 ENCOUNTER — HOME CARE VISIT (OUTPATIENT)
Dept: HOME HEALTH SERVICES | Facility: HOME HEALTH | Age: 64
End: 2024-05-07
Payer: COMMERCIAL

## 2024-05-07 LAB
ANION GAP SERPL CALC-SCNC: 15 MMOL/L (ref 10–20)
BUN SERPL-MCNC: 7 MG/DL (ref 6–23)
CALCIUM SERPL-MCNC: 9 MG/DL (ref 8.6–10.6)
CHLORIDE SERPL-SCNC: 99 MMOL/L (ref 98–107)
CK SERPL-CCNC: 421 U/L (ref 0–215)
CO2 SERPL-SCNC: 28 MMOL/L (ref 21–32)
CREAT SERPL-MCNC: 0.82 MG/DL (ref 0.5–1.05)
EGFRCR SERPLBLD CKD-EPI 2021: 80 ML/MIN/1.73M*2
ERYTHROCYTE [DISTWIDTH] IN BLOOD BY AUTOMATED COUNT: 15.2 % (ref 11.5–14.5)
GLUCOSE SERPL-MCNC: 94 MG/DL (ref 74–99)
HCT VFR BLD AUTO: 39.7 % (ref 36–46)
HGB BLD-MCNC: 12.6 G/DL (ref 12–16)
MCH RBC QN AUTO: 30.9 PG (ref 26–34)
MCHC RBC AUTO-ENTMCNC: 31.7 G/DL (ref 32–36)
MCV RBC AUTO: 97 FL (ref 80–100)
NRBC BLD-RTO: 0 /100 WBCS (ref 0–0)
PLATELET # BLD AUTO: 613 X10*3/UL (ref 150–450)
POTASSIUM SERPL-SCNC: 4.3 MMOL/L (ref 3.5–5.3)
RBC # BLD AUTO: 4.08 X10*6/UL (ref 4–5.2)
SODIUM SERPL-SCNC: 138 MMOL/L (ref 136–145)
WBC # BLD AUTO: 11.3 X10*3/UL (ref 4.4–11.3)

## 2024-05-09 ENCOUNTER — DOCUMENTATION (OUTPATIENT)
Dept: PHARMACY | Facility: CLINIC | Age: 64
End: 2024-05-09

## 2024-05-09 ENCOUNTER — HOME INFUSION (OUTPATIENT)
Dept: INFUSION THERAPY | Age: 64
End: 2024-05-09

## 2024-05-09 ENCOUNTER — LAB (OUTPATIENT)
Dept: LAB | Facility: LAB | Age: 64
End: 2024-05-09
Payer: COMMERCIAL

## 2024-05-09 ENCOUNTER — OFFICE VISIT (OUTPATIENT)
Dept: SURGERY | Facility: CLINIC | Age: 64
End: 2024-05-09
Payer: COMMERCIAL

## 2024-05-09 VITALS
TEMPERATURE: 98.2 F | HEIGHT: 68 IN | DIASTOLIC BLOOD PRESSURE: 81 MMHG | HEART RATE: 93 BPM | BODY MASS INDEX: 30.98 KG/M2 | SYSTOLIC BLOOD PRESSURE: 116 MMHG | WEIGHT: 204.4 LBS | OXYGEN SATURATION: 95 %

## 2024-05-09 DIAGNOSIS — K80.40 CHOLEDOCHOLITHIASIS WITH CHOLECYSTITIS: Primary | ICD-10-CM

## 2024-05-09 DIAGNOSIS — K91.2 INTESTINAL MALABSORPTION FOLLOWING GASTRECTOMY (HHS-HCC): ICD-10-CM

## 2024-05-09 DIAGNOSIS — Z98.84 S/P GASTRIC BYPASS: ICD-10-CM

## 2024-05-09 DIAGNOSIS — T81.42XD INFECTION OF DEEP INCISIONAL SURGICAL SITE AFTER PROCEDURE, SUBSEQUENT ENCOUNTER: ICD-10-CM

## 2024-05-09 DIAGNOSIS — Z90.49 S/P CHOLECYSTECTOMY: ICD-10-CM

## 2024-05-09 DIAGNOSIS — Z90.3 INTESTINAL MALABSORPTION FOLLOWING GASTRECTOMY (HHS-HCC): ICD-10-CM

## 2024-05-09 DIAGNOSIS — T82.898A OCCLUSION OF PERIPHERALLY INSERTED CENTRAL CATHETER (PICC) LINE, INITIAL ENCOUNTER (CMS-HCC): Primary | ICD-10-CM

## 2024-05-09 DIAGNOSIS — L02.211 ABDOMINAL WALL ABSCESS: ICD-10-CM

## 2024-05-09 LAB
25(OH)D3 SERPL-MCNC: 60 NG/ML (ref 30–100)
ALBUMIN SERPL BCP-MCNC: 4.2 G/DL (ref 3.4–5)
ALP SERPL-CCNC: 78 U/L (ref 33–136)
ALT SERPL W P-5'-P-CCNC: 29 U/L (ref 7–45)
ANION GAP SERPL CALC-SCNC: 13 MMOL/L (ref 10–20)
AST SERPL W P-5'-P-CCNC: 53 U/L (ref 9–39)
BILIRUB DIRECT SERPL-MCNC: 0.1 MG/DL (ref 0–0.3)
BILIRUB SERPL-MCNC: 0.5 MG/DL (ref 0–1.2)
BUN SERPL-MCNC: 6 MG/DL (ref 6–23)
CALCIUM SERPL-MCNC: 9.4 MG/DL (ref 8.6–10.3)
CHLORIDE SERPL-SCNC: 103 MMOL/L (ref 98–107)
CHOLEST SERPL-MCNC: 218 MG/DL (ref 0–199)
CHOLESTEROL/HDL RATIO: 3.3
CK SERPL-CCNC: 699 U/L (ref 0–215)
CO2 SERPL-SCNC: 28 MMOL/L (ref 21–32)
CREAT SERPL-MCNC: 0.9 MG/DL (ref 0.5–1.05)
EGFRCR SERPLBLD CKD-EPI 2021: 72 ML/MIN/1.73M*2
ERYTHROCYTE [DISTWIDTH] IN BLOOD BY AUTOMATED COUNT: 14.7 % (ref 11.5–14.5)
FERRITIN SERPL-MCNC: 380 NG/ML (ref 8–150)
FOLATE SERPL-MCNC: 7.6 NG/ML
GLUCOSE SERPL-MCNC: 109 MG/DL (ref 74–99)
HCT VFR BLD AUTO: 40.2 % (ref 36–46)
HDLC SERPL-MCNC: 66.3 MG/DL
HGB BLD-MCNC: 13 G/DL (ref 12–16)
IRON SATN MFR SERPL: 21 % (ref 25–45)
IRON SERPL-MCNC: 49 UG/DL (ref 35–150)
LDLC SERPL CALC-MCNC: 121 MG/DL
MCH RBC QN AUTO: 30.2 PG (ref 26–34)
MCHC RBC AUTO-ENTMCNC: 32.3 G/DL (ref 32–36)
MCV RBC AUTO: 93 FL (ref 80–100)
NON HDL CHOLESTEROL: 152 MG/DL (ref 0–149)
NRBC BLD-RTO: 0 /100 WBCS (ref 0–0)
PLATELET # BLD AUTO: 565 X10*3/UL (ref 150–450)
POTASSIUM SERPL-SCNC: 4.1 MMOL/L (ref 3.5–5.3)
PROT SERPL-MCNC: 6.9 G/DL (ref 6.4–8.2)
PTH-INTACT SERPL-MCNC: 54 PG/ML (ref 18.5–88)
RBC # BLD AUTO: 4.31 X10*6/UL (ref 4–5.2)
SODIUM SERPL-SCNC: 140 MMOL/L (ref 136–145)
TIBC SERPL-MCNC: 239 UG/DL (ref 240–445)
TRIGL SERPL-MCNC: 156 MG/DL (ref 0–149)
TSH SERPL-ACNC: 3.9 MIU/L (ref 0.44–3.98)
UIBC SERPL-MCNC: 190 UG/DL (ref 110–370)
VIT B12 SERPL-MCNC: 1857 PG/ML (ref 211–911)
VLDL: 31 MG/DL (ref 0–40)
WBC # BLD AUTO: 10.3 X10*3/UL (ref 4.4–11.3)

## 2024-05-09 PROCEDURE — 84443 ASSAY THYROID STIM HORMONE: CPT

## 2024-05-09 PROCEDURE — 99024 POSTOP FOLLOW-UP VISIT: CPT | Performed by: SURGERY

## 2024-05-09 PROCEDURE — 82248 BILIRUBIN DIRECT: CPT

## 2024-05-09 PROCEDURE — 84425 ASSAY OF VITAMIN B-1: CPT

## 2024-05-09 PROCEDURE — 82525 ASSAY OF COPPER: CPT

## 2024-05-09 PROCEDURE — 80061 LIPID PANEL: CPT

## 2024-05-09 PROCEDURE — 84630 ASSAY OF ZINC: CPT

## 2024-05-09 PROCEDURE — 82306 VITAMIN D 25 HYDROXY: CPT

## 2024-05-09 PROCEDURE — 83970 ASSAY OF PARATHORMONE: CPT

## 2024-05-09 PROCEDURE — 82746 ASSAY OF FOLIC ACID SERUM: CPT

## 2024-05-09 PROCEDURE — 1036F TOBACCO NON-USER: CPT | Performed by: SURGERY

## 2024-05-09 PROCEDURE — 84597 ASSAY OF VITAMIN K: CPT

## 2024-05-09 PROCEDURE — 3008F BODY MASS INDEX DOCD: CPT | Performed by: SURGERY

## 2024-05-09 PROCEDURE — 82607 VITAMIN B-12: CPT

## 2024-05-09 PROCEDURE — 80053 COMPREHEN METABOLIC PANEL: CPT

## 2024-05-09 PROCEDURE — 82550 ASSAY OF CK (CPK): CPT

## 2024-05-09 PROCEDURE — 82728 ASSAY OF FERRITIN: CPT

## 2024-05-09 PROCEDURE — 85027 COMPLETE CBC AUTOMATED: CPT

## 2024-05-09 PROCEDURE — 84207 ASSAY OF VITAMIN B-6: CPT

## 2024-05-09 PROCEDURE — 84590 ASSAY OF VITAMIN A: CPT

## 2024-05-09 PROCEDURE — 83540 ASSAY OF IRON: CPT

## 2024-05-09 PROCEDURE — 83550 IRON BINDING TEST: CPT

## 2024-05-09 RX ORDER — WATER 1 ML/ML
10 INJECTION INTRAMUSCULAR; INTRAVENOUS; SUBCUTANEOUS AS NEEDED
Qty: 10 ML | Refills: 11 | Status: SHIPPED
Start: 2024-05-09

## 2024-05-09 ASSESSMENT — PAIN SCALES - GENERAL: PAINLEVEL: 0-NO PAIN

## 2024-05-09 NOTE — PROGRESS NOTES
SPOKE W/ PT - DELIVERY IS SCHEDULED FOR FRIDAY BY 9 PM.  ASKED PT IF THERE ARE ANY QUESTIONS TO A PHARMACIST. PT SAID: NO QUESTIONS.

## 2024-05-09 NOTE — PROGRESS NOTES
Pharmacy notified that patient's line is fully occluded and CADD pump alarming. Patient's line assessed while at [facility] during GI followup visit, then pt was sent home. Cathflo requested by SN to be administered at home. Orders entered to [employer], white copies to intake. Pt agreeable to costs.    Pharmacy to adjust delivery intended to be sent tomorrow 5/10 tonight 5/8 before 8pm. Adding Cathflo delivery for tonight.    ADDENDUM: per RN Codie, line is fully occluded, pt unable to flush at all and unable to get any negative pressure. RN advised patient go to ER to have line assessed. No Cathflo required for tonight. Delivery will be sent tomorrow in light of patient going to ER.    Received orders from Dr. Dunn to continue antibiotics through 5/15. Patient to maintain PICC line until further notice. Patient needs to make a followup appt with Dr. Dunn.    Next followup 5/15 check supplies, check for appt with Shaun. Move ahead as indicated.

## 2024-05-10 ENCOUNTER — HOME CARE VISIT (OUTPATIENT)
Dept: HOME HEALTH SERVICES | Facility: HOME HEALTH | Age: 64
End: 2024-05-10
Payer: COMMERCIAL

## 2024-05-10 VITALS — HEART RATE: 84 BPM | OXYGEN SATURATION: 97 % | RESPIRATION RATE: 20 BRPM

## 2024-05-10 PROCEDURE — G0151 HHCP-SERV OF PT,EA 15 MIN: HCPCS

## 2024-05-10 SDOH — HEALTH STABILITY: PHYSICAL HEALTH: EXERCISE ACTIVITY: CAT/COW

## 2024-05-10 SDOH — HEALTH STABILITY: PHYSICAL HEALTH: PHYSICAL EXERCISE: 5

## 2024-05-10 SDOH — HEALTH STABILITY: PHYSICAL HEALTH: EXERCISE ACTIVITIES SETS: 1

## 2024-05-10 SDOH — HEALTH STABILITY: PHYSICAL HEALTH: EXERCISE TYPE: STANDING

## 2024-05-10 SDOH — HEALTH STABILITY: PHYSICAL HEALTH: PHYSICAL EXERCISE: STANDING

## 2024-05-10 SDOH — HEALTH STABILITY: PHYSICAL HEALTH: PHYSICAL EXERCISE: ALL 4S

## 2024-05-10 SDOH — HEALTH STABILITY: PHYSICAL HEALTH: PHYSICAL EXERCISE: 10

## 2024-05-10 SDOH — HEALTH STABILITY: PHYSICAL HEALTH: EXERCISE ACTIVITY: BACK AND TRUNK

## 2024-05-10 ASSESSMENT — ACTIVITIES OF DAILY LIVING (ADL)
AMBULATION_DISTANCE/DURATION_TOLERATED: 100 FEET
AMBULATION ASSISTANCE ON FLAT SURFACES: 1

## 2024-05-12 LAB
COPPER SERPL-MCNC: 140.2 UG/DL (ref 80–155)
PYRIDOXAL PHOS SERPL-SCNC: 198.3 NMOL/L (ref 20–125)
ZINC SERPL-MCNC: 72.8 UG/DL (ref 60–120)

## 2024-05-13 ENCOUNTER — LAB REQUISITION (OUTPATIENT)
Dept: LAB | Facility: LAB | Age: 64
End: 2024-05-13
Payer: COMMERCIAL

## 2024-05-13 ENCOUNTER — HOME CARE VISIT (OUTPATIENT)
Dept: HOME HEALTH SERVICES | Facility: HOME HEALTH | Age: 64
End: 2024-05-13
Payer: COMMERCIAL

## 2024-05-13 VITALS
HEART RATE: 70 BPM | TEMPERATURE: 97.5 F | OXYGEN SATURATION: 98 % | SYSTOLIC BLOOD PRESSURE: 124 MMHG | DIASTOLIC BLOOD PRESSURE: 74 MMHG | RESPIRATION RATE: 18 BRPM

## 2024-05-13 DIAGNOSIS — Z48.815 ENCOUNTER FOR SURGICAL AFTERCARE FOLLOWING SURGERY ON THE DIGESTIVE SYSTEM: ICD-10-CM

## 2024-05-13 LAB
ALBUMIN SERPL BCP-MCNC: 3.9 G/DL (ref 3.4–5)
ALP SERPL-CCNC: 71 U/L (ref 33–136)
ALT SERPL W P-5'-P-CCNC: 26 U/L (ref 7–45)
ANION GAP SERPL CALC-SCNC: 12 MMOL/L (ref 10–20)
ANNOTATION COMMENT IMP: NORMAL
AST SERPL W P-5'-P-CCNC: 38 U/L (ref 9–39)
BASOPHILS # BLD AUTO: 0.09 X10*3/UL (ref 0–0.1)
BASOPHILS NFR BLD AUTO: 1.1 %
BILIRUB SERPL-MCNC: 0.4 MG/DL (ref 0–1.2)
BUN SERPL-MCNC: 10 MG/DL (ref 6–23)
CALCIUM SERPL-MCNC: 8.8 MG/DL (ref 8.6–10.3)
CHLORIDE SERPL-SCNC: 104 MMOL/L (ref 98–107)
CK SERPL-CCNC: 379 U/L (ref 0–215)
CO2 SERPL-SCNC: 27 MMOL/L (ref 21–32)
CREAT SERPL-MCNC: 0.76 MG/DL (ref 0.5–1.05)
EGFRCR SERPLBLD CKD-EPI 2021: 88 ML/MIN/1.73M*2
EOSINOPHIL # BLD AUTO: 1.12 X10*3/UL (ref 0–0.7)
EOSINOPHIL NFR BLD AUTO: 13.1 %
ERYTHROCYTE [DISTWIDTH] IN BLOOD BY AUTOMATED COUNT: 14.7 % (ref 11.5–14.5)
GLUCOSE SERPL-MCNC: 104 MG/DL (ref 74–99)
HCT VFR BLD AUTO: 39.6 % (ref 36–46)
HGB BLD-MCNC: 13.1 G/DL (ref 12–16)
IMM GRANULOCYTES # BLD AUTO: 0.03 X10*3/UL (ref 0–0.7)
IMM GRANULOCYTES NFR BLD AUTO: 0.4 % (ref 0–0.9)
LYMPHOCYTES # BLD AUTO: 1.69 X10*3/UL (ref 1.2–4.8)
LYMPHOCYTES NFR BLD AUTO: 19.8 %
MCH RBC QN AUTO: 31 PG (ref 26–34)
MCHC RBC AUTO-ENTMCNC: 33.1 G/DL (ref 32–36)
MCV RBC AUTO: 94 FL (ref 80–100)
MONOCYTES # BLD AUTO: 0.86 X10*3/UL (ref 0.1–1)
MONOCYTES NFR BLD AUTO: 10.1 %
NEUTROPHILS # BLD AUTO: 4.73 X10*3/UL (ref 1.2–7.7)
NEUTROPHILS NFR BLD AUTO: 55.5 %
NRBC BLD-RTO: 0 /100 WBCS (ref 0–0)
PHYTONADIONE SERPL-MCNC: 0.64 NMOL/L (ref 0.22–4.88)
PLATELET # BLD AUTO: 396 X10*3/UL (ref 150–450)
POTASSIUM SERPL-SCNC: 4.1 MMOL/L (ref 3.5–5.3)
PROT SERPL-MCNC: 6.3 G/DL (ref 6.4–8.2)
RBC # BLD AUTO: 4.22 X10*6/UL (ref 4–5.2)
RETINYL PALMITATE SERPL-MCNC: <0.02 MG/L (ref 0–0.1)
SODIUM SERPL-SCNC: 139 MMOL/L (ref 136–145)
VIT A SERPL-MCNC: 0.67 MG/L (ref 0.3–1.2)
WBC # BLD AUTO: 8.5 X10*3/UL (ref 4.4–11.3)

## 2024-05-13 PROCEDURE — G0299 HHS/HOSPICE OF RN EA 15 MIN: HCPCS

## 2024-05-13 PROCEDURE — 82550 ASSAY OF CK (CPK): CPT

## 2024-05-13 PROCEDURE — 85025 COMPLETE CBC W/AUTO DIFF WBC: CPT

## 2024-05-13 PROCEDURE — 80053 COMPREHEN METABOLIC PANEL: CPT

## 2024-05-13 ASSESSMENT — PAIN SCALES - PAIN ASSESSMENT IN ADVANCED DEMENTIA (PAINAD)
NEGVOCALIZATION: 0
BODYLANGUAGE: 0
FACIALEXPRESSION: 0
CONSOLABILITY: 0
CONSOLABILITY: 0 - NO NEED TO CONSOLE.
BREATHING: 0
TOTALSCORE: 0
FACIALEXPRESSION: 0 - SMILING OR INEXPRESSIVE.
NEGVOCALIZATION: 0 - NONE.
BODYLANGUAGE: 0 - RELAXED.

## 2024-05-13 ASSESSMENT — ENCOUNTER SYMPTOMS
PERSON REPORTING PAIN: PATIENT
DENIES PAIN: 1
APPETITE LEVEL: GOOD
CHANGE IN APPETITE: UNCHANGED

## 2024-05-14 ENCOUNTER — HOME CARE VISIT (OUTPATIENT)
Dept: HOME HEALTH SERVICES | Facility: HOME HEALTH | Age: 64
End: 2024-05-14
Payer: COMMERCIAL

## 2024-05-14 ENCOUNTER — HOME INFUSION (OUTPATIENT)
Dept: INFUSION THERAPY | Age: 64
End: 2024-05-14
Payer: COMMERCIAL

## 2024-05-14 ENCOUNTER — APPOINTMENT (OUTPATIENT)
Dept: SURGERY | Facility: CLINIC | Age: 64
End: 2024-05-14
Payer: COMMERCIAL

## 2024-05-14 VITALS — RESPIRATION RATE: 20 BRPM

## 2024-05-14 LAB — VIT B1 PYROPHOSHATE BLD-SCNC: 166 NMOL/L (ref 70–180)

## 2024-05-14 PROCEDURE — G0151 HHCP-SERV OF PT,EA 15 MIN: HCPCS

## 2024-05-14 SDOH — HEALTH STABILITY: PHYSICAL HEALTH: PHYSICAL EXERCISE: 10

## 2024-05-14 SDOH — HEALTH STABILITY: PHYSICAL HEALTH: EXERCISE ACTIVITY: UE STRENGTHENING IN STANDING AND SITTING

## 2024-05-14 SDOH — HEALTH STABILITY: PHYSICAL HEALTH: PHYSICAL EXERCISE: STANIDNG AND SITTING

## 2024-05-14 SDOH — HEALTH STABILITY: PHYSICAL HEALTH: EXERCISE ACTIVITIES SETS: 1

## 2024-05-14 SDOH — HEALTH STABILITY: PHYSICAL HEALTH: EXERCISE TYPE: THERABAND

## 2024-05-14 ASSESSMENT — ENCOUNTER SYMPTOMS
DENIES PAIN: 1
OCCASIONAL FEELINGS OF UNSTEADINESS: 0
PERSON REPORTING PAIN: PATIENT

## 2024-05-14 ASSESSMENT — ACTIVITIES OF DAILY LIVING (ADL)
AMBULATION_DISTANCE/DURATION_TOLERATED: 200 FEET X2
AMBULATION ASSISTANCE ON FLAT SURFACES: 1

## 2024-05-14 NOTE — RESULT ENCOUNTER NOTE
Vitamin B6 level is too high - stop taking B6 supplements  Vitamin B12 level is too high - stop taking B12 supplements  Ferritin is too high + Iron storage is full  - stop taking B12 supplements  Abnormal lipids - Hyperlipidemia, follow up with your PCP  Abnormal liver test - chronic liver disease + s/p recent cholecystectomy, under MD care

## 2024-05-14 NOTE — PROGRESS NOTES
Received orders from Dr Dunn  Stop antibiotic as ordered after 5/15. Pt to maintain line thru 5/21 appt date.     Gold copy to intake.    [facility] spoke with pt, confirmed stopping of ABX and supplies/flushes in home to last thru 5/21. She verbalized understanding of daily flushing and is agreeable to holding off on pump  until after 5/21 appt. Pt to call pharmacy with any emergent needs in meantime.    FU 5/21 POC Shaun 10:30

## 2024-05-20 ENCOUNTER — HOME CARE VISIT (OUTPATIENT)
Dept: HOME HEALTH SERVICES | Facility: HOME HEALTH | Age: 64
End: 2024-05-20
Payer: COMMERCIAL

## 2024-05-20 ENCOUNTER — LAB REQUISITION (OUTPATIENT)
Dept: LAB | Facility: LAB | Age: 64
End: 2024-05-20
Payer: COMMERCIAL

## 2024-05-20 VITALS
SYSTOLIC BLOOD PRESSURE: 110 MMHG | TEMPERATURE: 97.3 F | OXYGEN SATURATION: 97 % | RESPIRATION RATE: 18 BRPM | HEART RATE: 80 BPM | DIASTOLIC BLOOD PRESSURE: 68 MMHG

## 2024-05-20 DIAGNOSIS — Z48.815 ENCOUNTER FOR SURGICAL AFTERCARE FOLLOWING SURGERY ON THE DIGESTIVE SYSTEM: ICD-10-CM

## 2024-05-20 LAB
ALBUMIN SERPL BCP-MCNC: 4.2 G/DL (ref 3.4–5)
ALP SERPL-CCNC: 89 U/L (ref 33–136)
ALT SERPL W P-5'-P-CCNC: 16 U/L (ref 7–45)
ANION GAP SERPL CALC-SCNC: 18 MMOL/L (ref 10–20)
AST SERPL W P-5'-P-CCNC: 16 U/L (ref 9–39)
BASOPHILS # BLD AUTO: 0.09 X10*3/UL (ref 0–0.1)
BASOPHILS NFR BLD AUTO: 0.9 %
BILIRUB DIRECT SERPL-MCNC: 0.1 MG/DL (ref 0–0.3)
BILIRUB DIRECT SERPL-MCNC: 0.1 MG/DL (ref 0–0.3)
BILIRUB SERPL-MCNC: 0.5 MG/DL (ref 0–1.2)
BUN SERPL-MCNC: 11 MG/DL (ref 6–23)
CALCIUM SERPL-MCNC: 9.4 MG/DL (ref 8.6–10.3)
CHLORIDE SERPL-SCNC: 102 MMOL/L (ref 98–107)
CK SERPL-CCNC: 46 U/L (ref 0–215)
CO2 SERPL-SCNC: 23 MMOL/L (ref 21–32)
CREAT SERPL-MCNC: 0.65 MG/DL (ref 0.5–1.05)
EGFRCR SERPLBLD CKD-EPI 2021: >90 ML/MIN/1.73M*2
EOSINOPHIL # BLD AUTO: 0.95 X10*3/UL (ref 0–0.7)
EOSINOPHIL NFR BLD AUTO: 9.5 %
ERYTHROCYTE [DISTWIDTH] IN BLOOD BY AUTOMATED COUNT: 15.1 % (ref 11.5–14.5)
GLUCOSE SERPL-MCNC: 129 MG/DL (ref 74–99)
HCT VFR BLD AUTO: 43.7 % (ref 36–46)
HGB BLD-MCNC: 13.9 G/DL (ref 12–16)
IMM GRANULOCYTES # BLD AUTO: 0.04 X10*3/UL (ref 0–0.7)
IMM GRANULOCYTES NFR BLD AUTO: 0.4 % (ref 0–0.9)
LYMPHOCYTES # BLD AUTO: 2.85 X10*3/UL (ref 1.2–4.8)
LYMPHOCYTES NFR BLD AUTO: 28.4 %
MCH RBC QN AUTO: 30.5 PG (ref 26–34)
MCHC RBC AUTO-ENTMCNC: 31.8 G/DL (ref 32–36)
MCV RBC AUTO: 96 FL (ref 80–100)
MONOCYTES # BLD AUTO: 0.87 X10*3/UL (ref 0.1–1)
MONOCYTES NFR BLD AUTO: 8.7 %
NEUTROPHILS # BLD AUTO: 5.23 X10*3/UL (ref 1.2–7.7)
NEUTROPHILS NFR BLD AUTO: 52.1 %
NRBC BLD-RTO: 0 /100 WBCS (ref 0–0)
PLATELET # BLD AUTO: 347 X10*3/UL (ref 150–450)
POTASSIUM SERPL-SCNC: 4 MMOL/L (ref 3.5–5.3)
PROT SERPL-MCNC: 6.9 G/DL (ref 6.4–8.2)
RBC # BLD AUTO: 4.55 X10*6/UL (ref 4–5.2)
SODIUM SERPL-SCNC: 139 MMOL/L (ref 136–145)
WBC # BLD AUTO: 10 X10*3/UL (ref 4.4–11.3)

## 2024-05-20 PROCEDURE — G0299 HHS/HOSPICE OF RN EA 15 MIN: HCPCS

## 2024-05-20 PROCEDURE — 82248 BILIRUBIN DIRECT: CPT

## 2024-05-20 PROCEDURE — 82550 ASSAY OF CK (CPK): CPT

## 2024-05-20 PROCEDURE — 85025 COMPLETE CBC W/AUTO DIFF WBC: CPT

## 2024-05-20 PROCEDURE — 80053 COMPREHEN METABOLIC PANEL: CPT

## 2024-05-20 ASSESSMENT — ENCOUNTER SYMPTOMS
HIGHEST PAIN SEVERITY IN PAST 24 HOURS: 0/10
MUSCLE WEAKNESS: 1
CHANGE IN APPETITE: UNCHANGED
FATIGUES EASILY: 1
APPETITE LEVEL: GOOD
FATIGUE: 1
DENIES PAIN: 1
LAST BOWEL MOVEMENT: 66979
PERSON REPORTING PAIN: PATIENT

## 2024-05-21 ENCOUNTER — HOME INFUSION (OUTPATIENT)
Dept: INFUSION THERAPY | Age: 64
End: 2024-05-21
Payer: COMMERCIAL

## 2024-05-21 NOTE — PROGRESS NOTES
RECEIVED A CALL FROM DR. OROURKE AND SHE SAID PATIENT IS DONE WITH IV ABT. AND SHE REMOVED THE PICC LINE IN THE OFFICE.  DC FROM PHARMACY SERVICES. ONIL

## 2024-05-27 ENCOUNTER — HOME CARE VISIT (OUTPATIENT)
Dept: HOME HEALTH SERVICES | Facility: HOME HEALTH | Age: 64
End: 2024-05-27
Payer: COMMERCIAL

## 2024-05-27 ASSESSMENT — PAIN SCALES - PAIN ASSESSMENT IN ADVANCED DEMENTIA (PAINAD)
TOTALSCORE: 0
CONSOLABILITY: 0
FACIALEXPRESSION: 0 - SMILING OR INEXPRESSIVE.
BREATHING: 0
CONSOLABILITY: 0 - NO NEED TO CONSOLE.
FACIALEXPRESSION: 0
BODYLANGUAGE: 0 - RELAXED.
NEGVOCALIZATION: 0 - NONE.
NEGVOCALIZATION: 0
BODYLANGUAGE: 0

## 2024-05-27 ASSESSMENT — ACTIVITIES OF DAILY LIVING (ADL)
HOME_HEALTH_OASIS: 00
OASIS_M1830: 00

## 2024-05-27 ASSESSMENT — ENCOUNTER SYMPTOMS
DENIES PAIN: 1
PERSON REPORTING PAIN: PATIENT

## 2024-06-16 ENCOUNTER — PATIENT MESSAGE (OUTPATIENT)
Dept: BEHAVIORAL HEALTH | Facility: CLINIC | Age: 64
End: 2024-06-16
Payer: COMMERCIAL

## 2024-06-16 DIAGNOSIS — F90.0 ATTENTION DEFICIT HYPERACTIVITY DISORDER (ADHD), PREDOMINANTLY INATTENTIVE TYPE: ICD-10-CM

## 2024-06-17 RX ORDER — DEXTROAMPHETAMINE SACCHARATE, AMPHETAMINE ASPARTATE, DEXTROAMPHETAMINE SULFATE AND AMPHETAMINE SULFATE 2.5; 2.5; 2.5; 2.5 MG/1; MG/1; MG/1; MG/1
10 TABLET ORAL 2 TIMES DAILY
Qty: 60 TABLET | Refills: 0 | Status: SHIPPED | OUTPATIENT
Start: 2024-06-17 | End: 2024-07-17

## 2024-06-26 ENCOUNTER — APPOINTMENT (OUTPATIENT)
Dept: BEHAVIORAL HEALTH | Facility: CLINIC | Age: 64
End: 2024-06-26
Payer: COMMERCIAL

## 2024-06-26 DIAGNOSIS — F33.1 MODERATE EPISODE OF RECURRENT MAJOR DEPRESSIVE DISORDER (MULTI): Primary | ICD-10-CM

## 2024-06-26 DIAGNOSIS — F41.1 GENERALIZED ANXIETY DISORDER: ICD-10-CM

## 2024-06-26 DIAGNOSIS — F90.0 ATTENTION DEFICIT HYPERACTIVITY DISORDER (ADHD), PREDOMINANTLY INATTENTIVE TYPE: ICD-10-CM

## 2024-06-26 DIAGNOSIS — F42.8 OBSESSIVE THINKING: ICD-10-CM

## 2024-06-26 PROCEDURE — 99214 OFFICE O/P EST MOD 30 MIN: CPT | Performed by: NURSE PRACTITIONER

## 2024-06-26 PROCEDURE — 3008F BODY MASS INDEX DOCD: CPT | Performed by: NURSE PRACTITIONER

## 2024-06-26 RX ORDER — SERTRALINE HYDROCHLORIDE 25 MG/1
25 TABLET, FILM COATED ORAL DAILY
Qty: 60 TABLET | Refills: 0 | Status: SHIPPED | OUTPATIENT
Start: 2024-06-26 | End: 2024-08-25

## 2024-06-26 RX ORDER — COVID-19 ANTIGEN TEST
1 KIT MISCELLANEOUS AS NEEDED
COMMUNITY
Start: 2024-05-03

## 2024-06-26 NOTE — PROGRESS NOTES
"Adult Ambulatory Psychiatry Progress Note      Assessment/Plan     Impression:  Mandy Ramirez is a 63 y.o. female domiciled  with adult children and grandchildren, employed as a  for a local business who presents for follow up with CC of \"I was in the hospital and had my gall bladder removed, in April as it exploded. They took out necrotic tissue, there were gall stones taken out and I was in the hospital for 3 weeks. While there, my BP was dropping dangerously low and they figured out it was the Clonidine, so we stopped it. \"    Plan: Patient was cooperative yet reserved. Indicated she was feeling more of the depression as of late, especially since she had surgery recently. Clonidine was discontinued while hospitalized due to noted significant drops in BP. Reviewed and agreed to increasing dose of Zoloft slightly but leave other medications and treatment plan in place.    Medication: Increases Zoloft 100mg to 125mg every day, Continues Adderall 10mg twice daily, Wellbutrin XL 450mg every day. Discontinued Clonidine. Continues Trazodone 200mg at bedtime.    Reviewed r/b/a, possible side effects of the medication. Client is aware about the benefit outweighs the risk.     Diagnoses and all orders for this visit:  Moderate episode of recurrent major depressive disorder (Multi)  -     sertraline (Zoloft) 25 mg tablet; Take 1 tablet (25 mg) by mouth once daily.  Obsessive thinking  -     sertraline (Zoloft) 25 mg tablet; Take 1 tablet (25 mg) by mouth once daily.  Generalized anxiety disorder  -     sertraline (Zoloft) 25 mg tablet; Take 1 tablet (25 mg) by mouth once daily.  Attention deficit hyperactivity disorder (ADHD), predominantly inattentive type  -     amphetamine-dextroamphetamine (Adderall) 10 mg tablet; Take 1 tablet (10 mg) by mouth 2 times a day. Do not fill before July 17, 2024.      Therapy: n/a    Other: n/a    Patient is reminded that if there is SI to call [phone] and get themselves to " "the closest ED for evaluation, otherwise contact me for other questions/concerns.     Subjective   HPI:  \"Both the patient, and family and caregivers and guardians as appropriate, were informed of the current need to conduct treatment via telephone. I have confirmed the patient's identity via the following (minimum of three) acceptable identifiers as per  Policy PH-9: [place]., , home address.\"     Virtual or Telephone Consent    An interactive audio and video telecommunication system which permits real time communications between the patient (at the originating site) and provider (at the distant site) was utilized to provide this telehealth service.   Verbal consent was requested and obtained from Mandy Ashley on this date, 24 for a telehealth visit.     Present Illness - depression     Characteristics/Recent psychiatric symptoms (pertinent positives and negatives) - reports her hospitalization stay did cause her mood to be down, and be on IV antibiotic after the stay for several weeks to kill lingering infection, also left her feeling down as she did not like feeling sick all the time. \"Right now, I am starting to feel a little better.\" Admits being off Clonidine, as it was stopped in the hospital d/t BP dropping too low, has not had an impact on her sleep quality/quantity. Reports worrying about why she is still having such a huge issue with procrastination and not get a better handle on it, but also with that also impacting her depression and 'struggling with compartmentalizing it' as it doesn't leave her super depressed but 'it's always there.' Reports anxiety is 'getting better because otherwise I would be super going out of my mind.' Reports with her ADHD, and the Adderall 'it is ok, not better but it is ok.' Admits she can still forget to take her second dose of the medication but feels less lost mentally - less scattered and forgetful as she continues to heal from her recent surgery which she " feels did impact her mental and neurological health. Reports sleep remains down at 5 hours 'but that's still my fault. I am just not tired. I feel it is for me to change, but I do feel I get enough sleep.' Still takes Trazodone nightly. Reports racing, obsessive, ruminating thoughts have increased and ties them into the procrastination.   Reports appetite 'is slowly getting better, since the surgery.' Reports energy levels are 'down but slowly getting better' and still lingering 'mental and physical fatigue'.       Onset/timeframe - 2 months  Type - depression  Duration - daily  Aggravating and/or relieving factors/triggers - feels with her recent surgery bringing her mental health down along with worrying about her children's wellbeing  Treatment and treatment changes (new meds, dosage increases or decreases, med compliance, therapy frequency, etc.) (Past and Recent) - Zoloft 100mg QD, Wellbtutrin XL 450mg QD, Trazodone 200mg @[facility]. Adderall IR 10mg BID.      Issues: Denies SI/HI/AVH, currently.     With Neurology testing (including brain scan) there is noticeable atrophy (normal) of her brain.     Reports worrying about her daughter and her life choices and she had wanted to buffer her  from finding out, but as he has he is very angry with their daughter and as they had a close relationship, she feels sad about the situation.        Review of Systems   Constitutional: Negative.    HENT: Negative.     Endocrine:        Thyroid issues, hair thinning   Psychiatric/Behavioral:  Positive for dysphoric mood and sleep disturbance. The patient is nervous/anxious.        OARRS:  Olivier Stokes, YUDITH-CNP on 6/30/2024  9:54 PM  I have personally reviewed the OARRS report for Mandy Ramirez. I have considered the risks of abuse, dependence, addiction and diversion    Is the patient prescribed a combination of a benzodiazepine and opioid?  No    Last Urine Drug Screen / ordered today: No  Recent Results (from the past  8760 hour(s))   Opiate Confirmation, Urine    Collection Time: 10/05/23  1:16 PM   Result Value Ref Range    6-Acetylmorphine <25 <25 ng/mL    Codeine <50 <50 ng/mL    Hydrocodone <25 <25 ng/mL    Hydromorphone <25 <25 ng/mL    Morphine  244 (H) <50 ng/mL    Norhydrocodone <25 <25 ng/mL    Noroxycodone <25 <25 ng/mL    Oxycodone <25 <25 ng/mL    Oxymorphone <25 <25 ng/mL   Amphetamine Confirm, Urine    Collection Time: 10/05/23  1:16 PM   Result Value Ref Range    Methamphetamine Quant, Ur <200 ng/mL    MDA, Urine <200 ng/mL    MDEA, Urine <200 ng/mL    Phentermine,Urine <200 ng/mL    Amphetamines,Urine 2425 ng/mL    MDMA, Urine <200 ng/mL   Drug Screen, Urine With Reflex to Confirmation    Collection Time: 10/05/23  1:16 PM   Result Value Ref Range    Amphetamine Screen, Urine Presumptive Positive (A) Presumptive Negative    Barbiturate Screen, Urine Presumptive Negative Presumptive Negative    Benzodiazepines Screen, Urine Presumptive Negative Presumptive Negative    Cannabinoid Screen, Urine Presumptive Negative Presumptive Negative    Cocaine Metabolite Screen, Urine Presumptive Negative Presumptive Negative    Fentanyl Screen, Urine Presumptive Negative Presumptive Negative    Opiate Screen, Urine Presumptive Positive (A) Presumptive Negative    Oxycodone Screen, Urine Presumptive Negative Presumptive Negative    PCP Screen, Urine Presumptive Negative Presumptive Negative     Results are as expected.         Controlled Substance Agreement:  Date of the Last Agreement: 02/03/2024  Reviewed Controlled Substance Agreement including but not limited to the benefits, risks, and alternatives to treatment with a Controlled Substance medication(s).    Stimulants:   What is the patient's goal of therapy? Stable attention  Is this being achieved with current treatment? Not all the time, but this is due to issues with depression    Activities of Daily Living:   Is your overall impression that this patient is benefiting  (symptom reduction outweighs side effects) from stimulant therapy? No     1. Physical Functioning: Better  2. Family Relationship: Better  3. Social Relationship: Better  4. Mood: Better  5. Sleep Patterns: Better  6. Overall Function: Better      Objective   Mental Status Exam:  General Appearance: Fairly groomed  Attitude/Behavior: Superficially cooperative, Distracted  Motor: Fidgeting  Speech: Rapid Speech, pressured  Gait/Station: Other:(comment) (sitting in front of work computer, over virtual connection)  Mood: 'not the best'  Affect: Dysphoric, constricted but reactive, Anxious, Congruent with mood and topic of conversation  Thought Process: Perseverative, Flight of ideas  Thought Associations: No loosening of associations  Thought Content: Other: (comment) (overwhelmed with too many things in her life, and feeling more down than usual)  Perception: No perceptual abnormalities noted  Sensorium: Alert and oriented to person, place, time and situation  Insight: Fair  Judgement: Fair  Cognition: Cognitively intact to conversational testing with respect to attention, orientation, fund of knowledge, recent and remote memory, and language  Testing: N/A    SATISH-7/PHQ-9 scores reviewed: 9, 11 compared to 16, 21 reflecting significant decreases in anxiety and depression.     Current Medications:  Current Outpatient Medications on File Prior to Visit   Medication Sig Dispense Refill    aluminum hydrox-magnesium carb (Gaviscon Extra Strength) 160-105 mg tablet,chewable Chew 1 tablet if needed (acid reflux).      buPROPion XL (Wellbutrin XL) 150 mg 24 hr tablet TAKE 1 TABLET BY MOUTH EVERY DAY 90 tablet 3    buPROPion XL (Wellbutrin XL) 300 mg 24 hr tablet TAKE 1 TABLET BY MOUTH EVERY DAY 90 tablet 3    cholecalciferol (Vitamin D-3) 25 MCG (1000 UT) capsule Take 1 capsule (25 mcg) by mouth once daily.      COVID-19 At-Home Test kit Administer 1 kit into affected nostril(s) if needed (testing for COVID infection).       cyanocobalamin (Vitamin B-12) 100 mcg tablet Take 1 tablet (100 mcg) by mouth once daily.      ferrous fumarate-vitamin C ([place] 65-25) Take 1 tablet by mouth 3 times daily (morning, midday, late afternoon). Do not crush, chew, or split.      fluoride, sodium, 1.1 % gel Apply to teeth.      gabapentin (Neurontin) 100 mg capsule Take 2 capsules (200 mg) by mouth 3 times a day as needed (incisional pain). 180 capsule 0    levothyroxine (Synthroid, Levoxyl) 50 mcg tablet Take 1 tablet (50 mcg) by mouth once daily.      moisturizing mouth (Biotene Dry Mouth Oral Rinse) solution Take by mouth.      omega 3-dha-epa-fish oil (Fish OiL) 1,000 mg (120 mg-180 mg) capsule Take by mouth.      omeprazole (PriLOSEC) 40 mg DR capsule Take 1 capsule (40 mg) by mouth once daily in the morning. Take before meals. Do not crush or chew. Open capsule, sprinkle beads on SF jello, pudding or applesauce. 90 capsule 1    ondansetron ODT (Zofran-ODT) 4 mg disintegrating tablet Take 1 tablet (4 mg) by mouth every 6 hours if needed for nausea or vomiting. 60 tablet 1    pyridoxine (Vitamin B-6) 25 mg tablet Take 1 tablet (25 mg) by mouth once daily.      sertraline (Zoloft) 100 mg tablet Take 1 tablet (100 mg) by mouth once daily. 90 tablet 3    traZODone (Desyrel) 100 mg tablet TAKE 1 TABLET BEDTIME AS DIRECTED (MAY TAKE UP TO 2 TABS AT BEDTIME FOR SLEEP) 180 tablet 0    [DISCONTINUED] amphetamine-dextroamphetamine (Adderall) 10 mg tablet Take 1 tablet (10 mg) by mouth 2 times a day. Do not start before May 12, 2024. 60 tablet 0    [DISCONTINUED] amphetamine-dextroamphetamine (Adderall) 10 mg tablet Take 1 tablet (10 mg) by mouth 2 times a day. 60 tablet 0    heparin flush 100 unit/mL syringe Infuse 0.5 mL (50 Units) into a venous catheter once daily.      [DISCONTINUED] 0.9 % sodium chloride (sodium chloride, PF, 0.9%) injection Infuse 10 mL into a venous catheter once daily.      [DISCONTINUED] alteplase (Cathflo Activase) 2 mg  injection 2 mL (2 mg) by intra-catheter route if needed (occluded catheter). 1 each 11    [DISCONTINUED] cloNIDine (Catapres) 0.2 mg tablet Take 1 tablet (0.2 mg) by mouth once daily at bedtime. (Patient not taking: Reported on 5/9/2024) 90 tablet 3    [DISCONTINUED] sterile water (Sterile Water for Injection) injection Infuse 10 mL into a venous catheter if needed (for occluded catheter). (Patient not taking: Reported on 6/26/2024) 10 mL 11     No current facility-administered medications on file prior to visit.       Lab Review:   Lab Requisition on 05/20/2024   Component Date Value    Glucose 05/20/2024 129 (H)     Sodium 05/20/2024 139     Potassium 05/20/2024 4.0     Chloride 05/20/2024 102     Bicarbonate 05/20/2024 23     Anion Gap 05/20/2024 18     Urea Nitrogen 05/20/2024 11     Creatinine 05/20/2024 0.65     eGFR 05/20/2024 >90     Calcium 05/20/2024 9.4     Albumin 05/20/2024 4.2     Bilirubin, Total 05/20/2024 0.5     Bilirubin, Direct 05/20/2024 0.1     Alkaline Phosphatase 05/20/2024 89     ALT 05/20/2024 16     AST 05/20/2024 16     Total Protein 05/20/2024 6.9     WBC 05/20/2024 10.0     nRBC 05/20/2024 0.0     RBC 05/20/2024 4.55     Hemoglobin 05/20/2024 13.9     Hematocrit 05/20/2024 43.7     MCV 05/20/2024 96     MCH 05/20/2024 30.5     MCHC 05/20/2024 31.8 (L)     RDW 05/20/2024 15.1 (H)     Platelets 05/20/2024 347     Neutrophils % 05/20/2024 52.1     Immature Granulocytes %,* 05/20/2024 0.4     Lymphocytes % 05/20/2024 28.4     Monocytes % 05/20/2024 8.7     Eosinophils % 05/20/2024 9.5     Basophils % 05/20/2024 0.9     Neutrophils Absolute 05/20/2024 5.23     Immature Granulocytes Ab* 05/20/2024 0.04     Lymphocytes Absolute 05/20/2024 2.85     Monocytes Absolute 05/20/2024 0.87     Eosinophils Absolute 05/20/2024 0.95 (H)     Basophils Absolute 05/20/2024 0.09     Creatine Kinase 05/20/2024 46     Bilirubin, Direct 05/20/2024 0.1    Lab Requisition on 05/13/2024   Component Date Value     Glucose 05/13/2024 104 (H)     Sodium 05/13/2024 139     Potassium 05/13/2024 4.1     Chloride 05/13/2024 104     Bicarbonate 05/13/2024 27     Anion Gap 05/13/2024 12     Urea Nitrogen 05/13/2024 10     Creatinine 05/13/2024 0.76     eGFR 05/13/2024 88     Calcium 05/13/2024 8.8     Albumin 05/13/2024 3.9     Alkaline Phosphatase 05/13/2024 71     Total Protein 05/13/2024 6.3 (L)     AST 05/13/2024 38     Bilirubin, Total 05/13/2024 0.4     ALT 05/13/2024 26     Creatine Kinase 05/13/2024 379 (H)     WBC 05/13/2024 8.5     nRBC 05/13/2024 0.0     RBC 05/13/2024 4.22     Hemoglobin 05/13/2024 13.1     Hematocrit 05/13/2024 39.6     MCV 05/13/2024 94     MCH 05/13/2024 31.0     MCHC 05/13/2024 33.1     RDW 05/13/2024 14.7 (H)     Platelets 05/13/2024 396     Neutrophils % 05/13/2024 55.5     Immature Granulocytes %,* 05/13/2024 0.4     Lymphocytes % 05/13/2024 19.8     Monocytes % 05/13/2024 10.1     Eosinophils % 05/13/2024 13.1     Basophils % 05/13/2024 1.1     Neutrophils Absolute 05/13/2024 4.73     Immature Granulocytes Ab* 05/13/2024 0.03     Lymphocytes Absolute 05/13/2024 1.69     Monocytes Absolute 05/13/2024 0.86     Eosinophils Absolute 05/13/2024 1.12 (H)     Basophils Absolute 05/13/2024 0.09    Lab on 05/09/2024   Component Date Value    Zinc, Serum or Plasma 05/09/2024 72.8     Vitamin B12 05/09/2024 1,857 (H)     Vitamin A (Retinol) 05/09/2024 0.67     Vitamin A (Retinyl Palmi* 05/09/2024 <0.02     Vitamin A, Interpretation 05/09/2024 Normal     Vitamin K 05/09/2024 0.64     Thyroid Stimulating Horm* 05/09/2024 3.90     Ferritin 05/09/2024 380 (H)     Vitamin B6 05/09/2024 198.3 (H)     Iron 05/09/2024 49     UIBC 05/09/2024 190     TIBC 05/09/2024 239 (L)     % Saturation 05/09/2024 21 (L)     Cholesterol 05/09/2024 218 (H)     HDL-Cholesterol 05/09/2024 66.3     Cholesterol/HDL Ratio 05/09/2024 3.3     LDL Calculated 05/09/2024 121 (H)     VLDL 05/09/2024 31     Triglycerides 05/09/2024 156 (H)      Non HDL Cholesterol 05/09/2024 152 (H)     Glucose 05/09/2024 109 (H)     Sodium 05/09/2024 140     Potassium 05/09/2024 4.1     Chloride 05/09/2024 103     Bicarbonate 05/09/2024 28     Anion Gap 05/09/2024 13     Urea Nitrogen 05/09/2024 6     Creatinine 05/09/2024 0.90     eGFR 05/09/2024 72     Calcium 05/09/2024 9.4     Albumin 05/09/2024 4.2     Alkaline Phosphatase 05/09/2024 78     Total Protein 05/09/2024 6.9     AST 05/09/2024 53 (H)     Bilirubin, Total 05/09/2024 0.5     ALT 05/09/2024 29     Vitamin D, 25-Hydroxy, T* 05/09/2024 60     Parathyroid Hormone, Int* 05/09/2024 54.0     Vitamin B1, Whole Blood 05/09/2024 166     Folate, Serum 05/09/2024 7.6     Copper 05/09/2024 140.2     WBC 05/09/2024 10.3     nRBC 05/09/2024 0.0     RBC 05/09/2024 4.31     Hemoglobin 05/09/2024 13.0     Hematocrit 05/09/2024 40.2     MCV 05/09/2024 93     MCH 05/09/2024 30.2     MCHC 05/09/2024 32.3     RDW 05/09/2024 14.7 (H)     Platelets 05/09/2024 565 (H)     Creatine Kinase 05/09/2024 699 (H)     Bilirubin, Direct 05/09/2024 0.1    Lab Requisition on 05/06/2024   Component Date Value    Glucose 05/06/2024 94     Sodium 05/06/2024 138     Potassium 05/06/2024 4.3     Chloride 05/06/2024 99     Bicarbonate 05/06/2024 28     Anion Gap 05/06/2024 15     Urea Nitrogen 05/06/2024 7     Creatinine 05/06/2024 0.82     eGFR 05/06/2024 80     Calcium 05/06/2024 9.0     WBC 05/06/2024 11.3     nRBC 05/06/2024 0.0     RBC 05/06/2024 4.08     Hemoglobin 05/06/2024 12.6     Hematocrit 05/06/2024 39.7     MCV 05/06/2024 97     MCH 05/06/2024 30.9     MCHC 05/06/2024 31.7 (L)     RDW 05/06/2024 15.2 (H)     Platelets 05/06/2024 613 (H)     Creatine Kinase 05/06/2024 421 (H)    No results displayed because visit has over 200 results.      Pre-Admission Testing on 04/04/2024   Component Date Value    Thyroid Stimulating Horm* 04/04/2024 4.15 (H)     ABO TYPE 04/04/2024 O     Rh TYPE 04/04/2024 POS     ANTIBODY SCREEN 04/04/2024 NEG      Protime 04/04/2024 11.1     INR 04/04/2024 1.0     WBC 04/04/2024 8.5     nRBC 04/04/2024 0.0     RBC 04/04/2024 5.07     Hemoglobin 04/04/2024 15.8     Hematocrit 04/04/2024 47.4 (H)     MCV 04/04/2024 94     MCH 04/04/2024 31.2     MCHC 04/04/2024 33.3     RDW 04/04/2024 13.6     Platelets 04/04/2024 269     Neutrophils % 04/04/2024 45.1     Immature Granulocytes %,* 04/04/2024 0.4     Lymphocytes % 04/04/2024 33.5     Monocytes % 04/04/2024 7.2     Eosinophils % 04/04/2024 13.1     Basophils % 04/04/2024 0.7     Neutrophils Absolute 04/04/2024 3.85     Immature Granulocytes Ab* 04/04/2024 0.03     Lymphocytes Absolute 04/04/2024 2.86     Monocytes Absolute 04/04/2024 0.61     Eosinophils Absolute 04/04/2024 1.12 (H)     Basophils Absolute 04/04/2024 0.06     Nicotine, Urine 04/04/2024 <15     Cotinine, Urine 04/04/2024 <15     3-OH-Cotinine, Urine 04/04/2024 <50     Anabasine, Urine 04/04/2024 <5     Glucose 04/04/2024 95     Sodium 04/04/2024 138     Potassium 04/04/2024 3.9     Chloride 04/04/2024 103     Bicarbonate 04/04/2024 28     Anion Gap 04/04/2024 11     Urea Nitrogen 04/04/2024 9     Creatinine 04/04/2024 0.94     eGFR 04/04/2024 68     Calcium 04/04/2024 9.4     Albumin 04/04/2024 4.2     Alkaline Phosphatase 04/04/2024 81     Total Protein 04/04/2024 6.4     AST 04/04/2024 18     Bilirubin, Total 04/04/2024 0.5     ALT 04/04/2024 14     Color, Urine 04/04/2024 Yellow     Appearance, Urine 04/04/2024 Clear     Specific Gravity, Urine 04/04/2024 1.014     pH, Urine 04/04/2024 6.0     Protein, Urine 04/04/2024 NEGATIVE     Glucose, Urine 04/04/2024 NEGATIVE     Blood, Urine 04/04/2024 NEGATIVE     Ketones, Urine 04/04/2024 NEGATIVE     Bilirubin, Urine 04/04/2024 NEGATIVE     Urobilinogen, Urine 04/04/2024 2.0 (N)     Nitrite, Urine 04/04/2024 NEGATIVE     Leukocyte Esterase, Urine 04/04/2024 TRACE (A)     Extra Tube 04/04/2024 Hold for add-ons.     WBC, Urine 04/04/2024 1-5     RBC, Urine 04/04/2024  NONE     Mucus, Urine 04/04/2024 1+     Urine Culture 04/04/2024 No growth     Thyroxine, Free 04/04/2024 0.81          Time Spent:    Prep time: 1 min.  Direct patient time: 28 min.  Documentation time: 8 min.  Total time: 37 min.    Next Appointment:  Follow up in about 6 weeks (around 8/7/2024).

## 2024-06-30 PROBLEM — F33.1 MODERATE EPISODE OF RECURRENT MAJOR DEPRESSIVE DISORDER (MULTI): Status: ACTIVE | Noted: 2024-06-30

## 2024-06-30 RX ORDER — DEXTROAMPHETAMINE SACCHARATE, AMPHETAMINE ASPARTATE, DEXTROAMPHETAMINE SULFATE AND AMPHETAMINE SULFATE 2.5; 2.5; 2.5; 2.5 MG/1; MG/1; MG/1; MG/1
10 TABLET ORAL 2 TIMES DAILY
Qty: 60 TABLET | Refills: 0 | Status: SHIPPED | OUTPATIENT
Start: 2024-07-17 | End: 2024-08-16

## 2024-06-30 ASSESSMENT — ENCOUNTER SYMPTOMS
NERVOUS/ANXIOUS: 1
CONSTITUTIONAL NEGATIVE: 1
SLEEP DISTURBANCE: 1
DYSPHORIC MOOD: 1

## 2024-07-03 ENCOUNTER — APPOINTMENT (OUTPATIENT)
Dept: PRIMARY CARE | Facility: CLINIC | Age: 64
End: 2024-07-03
Payer: COMMERCIAL

## 2024-07-03 VITALS
DIASTOLIC BLOOD PRESSURE: 88 MMHG | HEIGHT: 68 IN | OXYGEN SATURATION: 95 % | WEIGHT: 203 LBS | TEMPERATURE: 96.2 F | HEART RATE: 77 BPM | BODY MASS INDEX: 30.77 KG/M2 | SYSTOLIC BLOOD PRESSURE: 131 MMHG

## 2024-07-03 DIAGNOSIS — R73.01 IMPAIRED FASTING GLUCOSE: ICD-10-CM

## 2024-07-03 DIAGNOSIS — E03.9 ACQUIRED HYPOTHYROIDISM: Primary | ICD-10-CM

## 2024-07-03 PROBLEM — K44.9 HIATAL HERNIA: Status: RESOLVED | Noted: 2024-03-25 | Resolved: 2024-07-03

## 2024-07-03 PROBLEM — K75.9 HEPATITIS: Status: RESOLVED | Noted: 2023-08-14 | Resolved: 2024-07-03

## 2024-07-03 PROBLEM — K80.20 CHOLELITHIASIS: Status: RESOLVED | Noted: 2023-05-04 | Resolved: 2024-07-03

## 2024-07-03 PROBLEM — K80.42 CHOLEDOCHOLITHIASIS WITH ACUTE CHOLECYSTITIS: Status: RESOLVED | Noted: 2024-04-18 | Resolved: 2024-07-03

## 2024-07-03 PROBLEM — Z01.818 PRE-OPERATIVE CLEARANCE: Status: RESOLVED | Noted: 2024-01-26 | Resolved: 2024-07-03

## 2024-07-03 PROBLEM — K80.20 GALLSTONES: Status: RESOLVED | Noted: 2024-04-11 | Resolved: 2024-07-03

## 2024-07-03 PROBLEM — Z90.49 S/P CHOLECYSTECTOMY: Status: RESOLVED | Noted: 2024-04-26 | Resolved: 2024-07-03

## 2024-07-03 PROBLEM — K83.8 DILATED CBD, ACQUIRED: Status: RESOLVED | Noted: 2023-09-04 | Resolved: 2024-07-03

## 2024-07-03 PROBLEM — F33.1 MODERATE EPISODE OF RECURRENT MAJOR DEPRESSIVE DISORDER (MULTI): Status: ACTIVE | Noted: 2024-02-03

## 2024-07-03 PROBLEM — K74.00 LIVER FIBROSIS: Status: ACTIVE | Noted: 2024-07-03

## 2024-07-03 PROBLEM — Z87.898 HISTORY OF DRY MOUTH: Status: RESOLVED | Noted: 2023-09-04 | Resolved: 2024-07-03

## 2024-07-03 PROBLEM — F33.1 MODERATE EPISODE OF RECURRENT MAJOR DEPRESSIVE DISORDER (MULTI): Status: ACTIVE | Noted: 2023-04-20

## 2024-07-03 LAB — POC HEMOGLOBIN A1C: 5.3 % (ref 4.2–6.5)

## 2024-07-03 PROCEDURE — 3008F BODY MASS INDEX DOCD: CPT | Performed by: FAMILY MEDICINE

## 2024-07-03 PROCEDURE — 1036F TOBACCO NON-USER: CPT | Performed by: FAMILY MEDICINE

## 2024-07-03 PROCEDURE — 99214 OFFICE O/P EST MOD 30 MIN: CPT | Performed by: FAMILY MEDICINE

## 2024-07-03 PROCEDURE — 83036 HEMOGLOBIN GLYCOSYLATED A1C: CPT | Performed by: FAMILY MEDICINE

## 2024-07-03 RX ORDER — LEVOTHYROXINE SODIUM 50 UG/1
50 TABLET ORAL DAILY
Qty: 30 TABLET | Refills: 1 | Status: SHIPPED | OUTPATIENT
Start: 2024-07-03 | End: 2024-09-01

## 2024-07-03 ASSESSMENT — ENCOUNTER SYMPTOMS
ABDOMINAL PAIN: 0
COUGH: 1
PALPITATIONS: 0
UNEXPECTED WEIGHT CHANGE: 0
FATIGUE: 0
SHORTNESS OF BREATH: 0

## 2024-07-03 ASSESSMENT — PATIENT HEALTH QUESTIONNAIRE - PHQ9
2. FEELING DOWN, DEPRESSED OR HOPELESS: NOT AT ALL
1. LITTLE INTEREST OR PLEASURE IN DOING THINGS: NOT AT ALL
SUM OF ALL RESPONSES TO PHQ9 QUESTIONS 1 & 2: 0

## 2024-07-03 NOTE — PROGRESS NOTES
"Subjective   Patient ID: Mandy Ramirez is a 63 y.o. female who presents for Follow-up and Med Refill (Follow up from gall bladder surgery).    Mandy is here to follow up after being hospitalized for gallbladder surgery in late April, did have an infection and was discharged to home on 5/2.  She has a history of hypothyroidism, has been lost to follow up and has been off her medication for quite some time.  She will need her Welcome to Medicare visit this fall.        Review of Systems   Constitutional:  Negative for fatigue and unexpected weight change.   Respiratory:  Positive for cough. Negative for shortness of breath.    Cardiovascular:  Negative for chest pain and palpitations.   Gastrointestinal:  Negative for abdominal pain.       Objective     /88   Pulse 77   Temp 35.7 °C (96.2 °F)   Ht 1.727 m (5' 8\")   Wt 92.1 kg (203 lb)   SpO2 95%   BMI 30.87 kg/m²     Physical Exam  Constitutional:       General: She is not in acute distress.     Appearance: She is obese.   Neck:      Thyroid: No thyromegaly.   Cardiovascular:      Rate and Rhythm: Normal rate and regular rhythm.      Heart sounds: No murmur heard.  Pulmonary:      Effort: No respiratory distress.      Breath sounds: Normal breath sounds.   Lymphadenopathy:      Cervical: No cervical adenopathy.   Neurological:      Mental Status: She is alert.             Assessment/Plan   Problem List Items Addressed This Visit       Acquired hypothyroidism - Primary    Current Assessment & Plan     Will resume levothyroxine and recheck labs in a month.  F/U in October for her Welcome to Medicare visit.         Relevant Medications    levothyroxine (Synthroid, Levoxyl) 50 mcg tablet    Other Relevant Orders    TSH with reflex to Free T4 if abnormal    Impaired fasting glucose    Current Assessment & Plan     Not diabetic.  Will monitor.         Relevant Orders    POCT glycosylated hemoglobin (Hb A1C) manually resulted (Completed)           "

## 2024-07-03 NOTE — ASSESSMENT & PLAN NOTE
Will resume levothyroxine and recheck labs in a month.  F/U in October for her Welcome to Medicare visit.

## 2024-08-05 ENCOUNTER — APPOINTMENT (OUTPATIENT)
Dept: BEHAVIORAL HEALTH | Facility: CLINIC | Age: 64
End: 2024-08-05
Payer: COMMERCIAL

## 2024-08-05 DIAGNOSIS — F33.0 MILD EPISODE OF RECURRENT MAJOR DEPRESSIVE DISORDER (CMS-HCC): Primary | ICD-10-CM

## 2024-08-05 DIAGNOSIS — F42.8 OBSESSIVE THINKING: ICD-10-CM

## 2024-08-05 DIAGNOSIS — F41.1 GENERALIZED ANXIETY DISORDER: ICD-10-CM

## 2024-08-05 DIAGNOSIS — F90.0 ATTENTION DEFICIT HYPERACTIVITY DISORDER (ADHD), PREDOMINANTLY INATTENTIVE TYPE: ICD-10-CM

## 2024-08-05 PROCEDURE — 99214 OFFICE O/P EST MOD 30 MIN: CPT | Performed by: NURSE PRACTITIONER

## 2024-08-05 ASSESSMENT — ENCOUNTER SYMPTOMS: CONSTITUTIONAL NEGATIVE: 1

## 2024-08-05 NOTE — PROGRESS NOTES
"Adult Ambulatory Psychiatry Progress Note      Assessment/Plan     Impression:  Mandy Ramirez is a 63 y.o. female domiciled  with adult children and grandchildren, employed as a  for a local business who presents for follow up with CC of \"I think things are good, but my memory is bad. Really bad.\"    Plan: Patient was cooperative and more engaging if not slightly anxious. Feels increased dose of Zoloft is helping. Discussed memory issues ongoing and it is more tied to her ADHD, and discussed the topic of ADHD Paralysis and suggested patient review articles related to the topic and look at the suggestions on how to better manage the process of tasks to aid in her thought process/executive function. Reviewed and agreed to leaving medications and treatment plan in place.    Medication: Zoloft 125mg every day, Adderall 10mg twice daily, Wellbutrin XL 450mg every day. Trazodone 200mg at bedtime.    Reviewed r/b/a, possible side effects of the medication. Client is aware about the benefit outweighs the risk.     Diagnoses and all orders for this visit:  Mild episode of recurrent major depressive disorder (CMS-HCC)  Attention deficit hyperactivity disorder (ADHD), predominantly inattentive type  -     Amphetamine Confirm, Urine; Future  -     amphetamine-dextroamphetamine (Adderall) 10 mg tablet; Take 1 tablet (10 mg) by mouth 2 times a day. Do not fill before August 16, 2024.  -     amphetamine-dextroamphetamine (Adderall) 10 mg tablet; Take 1 tablet (10 mg) by mouth 2 times a day. Do not fill before September 15, 2024.  -     amphetamine-dextroamphetamine (Adderall) 10 mg tablet; Take 1 tablet (10 mg) by mouth 2 times a day. Do not fill before October 15, 2024.  Generalized anxiety disorder  Obsessive thinking        Therapy: n/a    Other: n/a    Patient is reminded that if there is SI to call [phone] and get themselves to the closest ED for evaluation, otherwise contact me for other questions/concerns. " "    Subjective   HPI:  \"Both the patient, and family and caregivers and guardians as appropriate, were informed of the current need to conduct treatment via telephone. I have confirmed the patient's identity via the following (minimum of three) acceptable identifiers as per  Policy PH-9: [place] , home address.\"     Virtual or Telephone Consent    An interactive audio and video telecommunication system which permits real time communications between the patient (at the originating site) and provider (at the distant site) was utilized to provide this telehealth service.   Verbal consent was requested and obtained from Mandy Diehlerlinda on this date, 24 for a telehealth visit.     Present Illness - depression     Characteristics/Recent psychiatric symptoms (pertinent positives and negatives) - reports the small increase in the Zoloft has helped her feel more 'even keeled and not as anxious like before.' Reports depression is a 'bit better as well.' Reports sleep is still 5-6 hrs with Trazodone but takes it at bedtime, but reveals 'I have my 2nd if not 3rd wind and that is what keeps me awake at night. I am on phone, or watching tv or my iPad, or reading my emails and that keeps me awake because I want to get things done and that is my quiet time.' Reports however 5-6 hours of sleep is enough and feels rested. Reports ADHD is stable but is noticing memory issues and brain fog remain an issue for her. Admits issues with task initiation (lists being too long), problems with too many tasks for work and in her personal life and still worrying about her grandkids but also her daughter and her poor choices. Reports her  did have a 'rotor rooted of his prostate' and they did find CA and they are now making a determination of what are next steps, 'and that too is weighing on her mind.' Reports with the Zoloft in place, she is able to keep her 's situation in perspective 'and not let me get anxious or let " my mind go racing away,' but also the political issues across the country does concern her, 'but I do have hope with Cecy in place.' Reports racing, obsessive, ruminating thoughts have slowed down. Reports appetite 'stable and I have actually gained a few pounds and I am eating healthier for the most part. I don't have quite the same cravings like I have had in the past.' Reports energy levels are 'better and also improved mental and physical fatigue'.       Onset/timeframe - 2 months  Type - depression, memory  Duration - daily  Aggravating and/or relieving factors/triggers - increased dose of Zoloft is helping with her depression but still worries about her memory issues.  Treatment and treatment changes (new meds, dosage increases or decreases, med compliance, therapy frequency, etc.) (Past and Recent) - Zoloft 125mg QD, Wellbtutrin XL 450mg QD, Trazodone 200mg @[facility]. Adderall IR 10mg BID.      Issues: Denies SI/HI/AVH, currently.       Review of Systems   Constitutional: Negative.    HENT: Negative.     Endocrine:        Thyroid issues, hair thinning   Neurological:         Memory   Psychiatric/Behavioral:  Positive for decreased concentration.        OARRS:  Olivier Stokes, APRN-CNP on 8/8/2024  8:46 AM  I have personally reviewed the OARRS report for Mandy Ramirez. I have considered the risks of abuse, dependence, addiction and diversion    Is the patient prescribed a combination of a benzodiazepine and opioid?  No    Last Urine Drug Screen / ordered today: No  Recent Results (from the past 8760 hour(s))   Opiate Confirmation, Urine    Collection Time: 10/05/23  1:16 PM   Result Value Ref Range    6-Acetylmorphine <25 <25 ng/mL    Codeine <50 <50 ng/mL    Hydrocodone <25 <25 ng/mL    Hydromorphone <25 <25 ng/mL    Morphine  244 (H) <50 ng/mL    Norhydrocodone <25 <25 ng/mL    Noroxycodone <25 <25 ng/mL    Oxycodone <25 <25 ng/mL    Oxymorphone <25 <25 ng/mL   Amphetamine Confirm, Urine    Collection Time:  10/05/23  1:16 PM   Result Value Ref Range    Methamphetamine Quant, Ur <200 ng/mL    MDA, Urine <200 ng/mL    MDEA, Urine <200 ng/mL    Phentermine,Urine <200 ng/mL    Amphetamines,Urine 2425 ng/mL    MDMA, Urine <200 ng/mL   Drug Screen, Urine With Reflex to Confirmation    Collection Time: 10/05/23  1:16 PM   Result Value Ref Range    Amphetamine Screen, Urine Presumptive Positive (A) Presumptive Negative    Barbiturate Screen, Urine Presumptive Negative Presumptive Negative    Benzodiazepines Screen, Urine Presumptive Negative Presumptive Negative    Cannabinoid Screen, Urine Presumptive Negative Presumptive Negative    Cocaine Metabolite Screen, Urine Presumptive Negative Presumptive Negative    Fentanyl Screen, Urine Presumptive Negative Presumptive Negative    Opiate Screen, Urine Presumptive Positive (A) Presumptive Negative    Oxycodone Screen, Urine Presumptive Negative Presumptive Negative    PCP Screen, Urine Presumptive Negative Presumptive Negative     Results are as expected.         Controlled Substance Agreement:  Date of the Last Agreement: 02/03/2024  Reviewed Controlled Substance Agreement including but not limited to the benefits, risks, and alternatives to treatment with a Controlled Substance medication(s).    Stimulants:   What is the patient's goal of therapy? Stable attention  Is this being achieved with current treatment? yes    Activities of Daily Living:   Is your overall impression that this patient is benefiting (symptom reduction outweighs side effects) from stimulant therapy? Yes     1. Physical Functioning: Better  2. Family Relationship: Better  3. Social Relationship: Better  4. Mood: Better  5. Sleep Patterns: Better  6. Overall Function: Better      Objective   Mental Status Exam:  General Appearance: Well groomed, appropriate eye contact  Attitude/Behavior: Cooperative, Distracted  Motor: No psychomotor agitation or retardation, no tremor or other abnormal movements  Speech:  Normal rate, volume, prosody  Gait/Station: Other:(comment) (sitting in front of home computer, over virtual connection)  Mood: 'good but worried about my memory still'  Affect: Euthymic, full-range, Anxious, Congruent with mood and topic of conversation  Thought Process: Perseverative, Flight of ideas (racing, obsessive)  Thought Associations: No loosening of associations  Thought Content: Normal, Other: (comment) (just worrying about memory issues)  Perception: No perceptual abnormalities noted  Sensorium: Alert and oriented to person, place, time and situation  Insight: Fair  Judgement: Intact  Cognition: Cognitively intact to conversational testing with respect to attention, orientation, fund of knowledge, recent and remote memory, and language  Testing: N/A    SATISH-7/PHQ-9 scores reviewed: 9, 11 compared to 16, 21 reflecting significant decreases in anxiety and depression.     Current Medications:  Current Outpatient Medications on File Prior to Visit   Medication Sig Dispense Refill    aluminum hydrox-magnesium carb (Gaviscon Extra Strength) 160-105 mg tablet,chewable Chew 1 tablet if needed (acid reflux).      amphetamine-dextroamphetamine (Adderall) 10 mg tablet Take 1 tablet (10 mg) by mouth 2 times a day. Do not fill before July 17, 2024. 60 tablet 0    buPROPion XL (Wellbutrin XL) 150 mg 24 hr tablet TAKE 1 TABLET BY MOUTH EVERY DAY 90 tablet 0    buPROPion XL (Wellbutrin XL) 300 mg 24 hr tablet TAKE 1 TABLET BY MOUTH EVERY DAY 90 tablet 0    cholecalciferol (Vitamin D-3) 25 MCG (1000 UT) capsule Take 1 capsule (25 mcg) by mouth once daily.      COVID-19 At-Home Test kit Administer 1 kit into affected nostril(s) if needed (testing for COVID infection).      cyanocobalamin (Vitamin B-12) 100 mcg tablet Take 1 tablet (100 mcg) by mouth once daily.      ferrous fumarate-vitamin C ([place] 65-25) Take 1 tablet by mouth 3 times daily (morning, midday, late afternoon). Do not crush, chew, or split.       fluoride, sodium, 1.1 % gel Apply to teeth.      levothyroxine (Synthroid, Levoxyl) 50 mcg tablet Take 1 tablet (50 mcg) by mouth once daily. 30 tablet 1    moisturizing mouth (Biotene Dry Mouth Oral Rinse) solution Take by mouth.      omega 3-dha-epa-fish oil (Fish OiL) 1,000 mg (120 mg-180 mg) capsule Take by mouth.      omeprazole (PriLOSEC) 40 mg DR capsule Take 1 capsule (40 mg) by mouth once daily in the morning. Take before meals. Do not crush or chew. Open capsule, sprinkle beads on SF jello, pudding or applesauce. 90 capsule 1    pyridoxine (Vitamin B-6) 25 mg tablet Take 1 tablet (25 mg) by mouth once daily.      sertraline (Zoloft) 100 mg tablet Take 1 tablet (100 mg) by mouth once daily. 90 tablet 3    sertraline (Zoloft) 25 mg tablet Take 1 tablet (25 mg) by mouth once daily. 60 tablet 0    traZODone (Desyrel) 100 mg tablet TAKE 1 TABLET BEDTIME AS DIRECTED (MAY TAKE UP TO 2 TABS AT BEDTIME FOR SLEEP) 180 tablet 0     No current facility-administered medications on file prior to visit.       Lab Review:   Office Visit on 07/03/2024   Component Date Value    POC HEMOGLOBIN A1c 07/03/2024 5.3    Lab Requisition on 05/20/2024   Component Date Value    Glucose 05/20/2024 129 (H)     Sodium 05/20/2024 139     Potassium 05/20/2024 4.0     Chloride 05/20/2024 102     Bicarbonate 05/20/2024 23     Anion Gap 05/20/2024 18     Urea Nitrogen 05/20/2024 11     Creatinine 05/20/2024 0.65     eGFR 05/20/2024 >90     Calcium 05/20/2024 9.4     Albumin 05/20/2024 4.2     Bilirubin, Total 05/20/2024 0.5     Bilirubin, Direct 05/20/2024 0.1     Alkaline Phosphatase 05/20/2024 89     ALT 05/20/2024 16     AST 05/20/2024 16     Total Protein 05/20/2024 6.9     WBC 05/20/2024 10.0     nRBC 05/20/2024 0.0     RBC 05/20/2024 4.55     Hemoglobin 05/20/2024 13.9     Hematocrit 05/20/2024 43.7     MCV 05/20/2024 96     MCH 05/20/2024 30.5     MCHC 05/20/2024 31.8 (L)     RDW 05/20/2024 15.1 (H)     Platelets 05/20/2024 347      Neutrophils % 05/20/2024 52.1     Immature Granulocytes %,* 05/20/2024 0.4     Lymphocytes % 05/20/2024 28.4     Monocytes % 05/20/2024 8.7     Eosinophils % 05/20/2024 9.5     Basophils % 05/20/2024 0.9     Neutrophils Absolute 05/20/2024 5.23     Immature Granulocytes Ab* 05/20/2024 0.04     Lymphocytes Absolute 05/20/2024 2.85     Monocytes Absolute 05/20/2024 0.87     Eosinophils Absolute 05/20/2024 0.95 (H)     Basophils Absolute 05/20/2024 0.09     Creatine Kinase 05/20/2024 46     Bilirubin, Direct 05/20/2024 0.1    Lab Requisition on 05/13/2024   Component Date Value    Glucose 05/13/2024 104 (H)     Sodium 05/13/2024 139     Potassium 05/13/2024 4.1     Chloride 05/13/2024 104     Bicarbonate 05/13/2024 27     Anion Gap 05/13/2024 12     Urea Nitrogen 05/13/2024 10     Creatinine 05/13/2024 0.76     eGFR 05/13/2024 88     Calcium 05/13/2024 8.8     Albumin 05/13/2024 3.9     Alkaline Phosphatase 05/13/2024 71     Total Protein 05/13/2024 6.3 (L)     AST 05/13/2024 38     Bilirubin, Total 05/13/2024 0.4     ALT 05/13/2024 26     Creatine Kinase 05/13/2024 379 (H)     WBC 05/13/2024 8.5     nRBC 05/13/2024 0.0     RBC 05/13/2024 4.22     Hemoglobin 05/13/2024 13.1     Hematocrit 05/13/2024 39.6     MCV 05/13/2024 94     MCH 05/13/2024 31.0     MCHC 05/13/2024 33.1     RDW 05/13/2024 14.7 (H)     Platelets 05/13/2024 396     Neutrophils % 05/13/2024 55.5     Immature Granulocytes %,* 05/13/2024 0.4     Lymphocytes % 05/13/2024 19.8     Monocytes % 05/13/2024 10.1     Eosinophils % 05/13/2024 13.1     Basophils % 05/13/2024 1.1     Neutrophils Absolute 05/13/2024 4.73     Immature Granulocytes Ab* 05/13/2024 0.03     Lymphocytes Absolute 05/13/2024 1.69     Monocytes Absolute 05/13/2024 0.86     Eosinophils Absolute 05/13/2024 1.12 (H)     Basophils Absolute 05/13/2024 0.09    Lab on 05/09/2024   Component Date Value    Zinc, Serum or Plasma 05/09/2024 72.8     Vitamin B12 05/09/2024 1,857 (H)     Vitamin A  (Retinol) 05/09/2024 0.67     Vitamin A (Retinyl Palmi* 05/09/2024 <0.02     Vitamin A, Interpretation 05/09/2024 Normal     Vitamin K 05/09/2024 0.64     Thyroid Stimulating Horm* 05/09/2024 3.90     Ferritin 05/09/2024 380 (H)     Vitamin B6 05/09/2024 198.3 (H)     Iron 05/09/2024 49     UIBC 05/09/2024 190     TIBC 05/09/2024 239 (L)     % Saturation 05/09/2024 21 (L)     Cholesterol 05/09/2024 218 (H)     HDL-Cholesterol 05/09/2024 66.3     Cholesterol/HDL Ratio 05/09/2024 3.3     LDL Calculated 05/09/2024 121 (H)     VLDL 05/09/2024 31     Triglycerides 05/09/2024 156 (H)     Non HDL Cholesterol 05/09/2024 152 (H)     Glucose 05/09/2024 109 (H)     Sodium 05/09/2024 140     Potassium 05/09/2024 4.1     Chloride 05/09/2024 103     Bicarbonate 05/09/2024 28     Anion Gap 05/09/2024 13     Urea Nitrogen 05/09/2024 6     Creatinine 05/09/2024 0.90     eGFR 05/09/2024 72     Calcium 05/09/2024 9.4     Albumin 05/09/2024 4.2     Alkaline Phosphatase 05/09/2024 78     Total Protein 05/09/2024 6.9     AST 05/09/2024 53 (H)     Bilirubin, Total 05/09/2024 0.5     ALT 05/09/2024 29     Vitamin D, 25-Hydroxy, T* 05/09/2024 60     Parathyroid Hormone, Int* 05/09/2024 54.0     Vitamin B1, Whole Blood 05/09/2024 166     Folate, Serum 05/09/2024 7.6     Copper 05/09/2024 140.2     WBC 05/09/2024 10.3     nRBC 05/09/2024 0.0     RBC 05/09/2024 4.31     Hemoglobin 05/09/2024 13.0     Hematocrit 05/09/2024 40.2     MCV 05/09/2024 93     MCH 05/09/2024 30.2     MCHC 05/09/2024 32.3     RDW 05/09/2024 14.7 (H)     Platelets 05/09/2024 565 (H)     Creatine Kinase 05/09/2024 699 (H)     Bilirubin, Direct 05/09/2024 0.1    Lab Requisition on 05/06/2024   Component Date Value    Glucose 05/06/2024 94     Sodium 05/06/2024 138     Potassium 05/06/2024 4.3     Chloride 05/06/2024 99     Bicarbonate 05/06/2024 28     Anion Gap 05/06/2024 15     Urea Nitrogen 05/06/2024 7     Creatinine 05/06/2024 0.82     eGFR 05/06/2024 80     Calcium  05/06/2024 9.0     WBC 05/06/2024 11.3     nRBC 05/06/2024 0.0     RBC 05/06/2024 4.08     Hemoglobin 05/06/2024 12.6     Hematocrit 05/06/2024 39.7     MCV 05/06/2024 97     MCH 05/06/2024 30.9     MCHC 05/06/2024 31.7 (L)     RDW 05/06/2024 15.2 (H)     Platelets 05/06/2024 613 (H)     Creatine Kinase 05/06/2024 421 (H)    No results displayed because visit has over 200 results.      Pre-Admission Testing on 04/04/2024   Component Date Value    Thyroid Stimulating Horm* 04/04/2024 4.15 (H)     ABO TYPE 04/04/2024 O     Rh TYPE 04/04/2024 POS     ANTIBODY SCREEN 04/04/2024 NEG     Protime 04/04/2024 11.1     INR 04/04/2024 1.0     WBC 04/04/2024 8.5     nRBC 04/04/2024 0.0     RBC 04/04/2024 5.07     Hemoglobin 04/04/2024 15.8     Hematocrit 04/04/2024 47.4 (H)     MCV 04/04/2024 94     MCH 04/04/2024 31.2     MCHC 04/04/2024 33.3     RDW 04/04/2024 13.6     Platelets 04/04/2024 269     Neutrophils % 04/04/2024 45.1     Immature Granulocytes %,* 04/04/2024 0.4     Lymphocytes % 04/04/2024 33.5     Monocytes % 04/04/2024 7.2     Eosinophils % 04/04/2024 13.1     Basophils % 04/04/2024 0.7     Neutrophils Absolute 04/04/2024 3.85     Immature Granulocytes Ab* 04/04/2024 0.03     Lymphocytes Absolute 04/04/2024 2.86     Monocytes Absolute 04/04/2024 0.61     Eosinophils Absolute 04/04/2024 1.12 (H)     Basophils Absolute 04/04/2024 0.06     Nicotine, Urine 04/04/2024 <15     Cotinine, Urine 04/04/2024 <15     3-OH-Cotinine, Urine 04/04/2024 <50     Anabasine, Urine 04/04/2024 <5     Glucose 04/04/2024 95     Sodium 04/04/2024 138     Potassium 04/04/2024 3.9     Chloride 04/04/2024 103     Bicarbonate 04/04/2024 28     Anion Gap 04/04/2024 11     Urea Nitrogen 04/04/2024 9     Creatinine 04/04/2024 0.94     eGFR 04/04/2024 68     Calcium 04/04/2024 9.4     Albumin 04/04/2024 4.2     Alkaline Phosphatase 04/04/2024 81     Total Protein 04/04/2024 6.4     AST 04/04/2024 18     Bilirubin, Total 04/04/2024 0.5     ALT  04/04/2024 14     Color, Urine 04/04/2024 Yellow     Appearance, Urine 04/04/2024 Clear     Specific Gravity, Urine 04/04/2024 1.014     pH, Urine 04/04/2024 6.0     Protein, Urine 04/04/2024 NEGATIVE     Glucose, Urine 04/04/2024 NEGATIVE     Blood, Urine 04/04/2024 NEGATIVE     Ketones, Urine 04/04/2024 NEGATIVE     Bilirubin, Urine 04/04/2024 NEGATIVE     Urobilinogen, Urine 04/04/2024 2.0 (N)     Nitrite, Urine 04/04/2024 NEGATIVE     Leukocyte Esterase, Urine 04/04/2024 TRACE (A)     Extra Tube 04/04/2024 Hold for add-ons.     WBC, Urine 04/04/2024 1-5     RBC, Urine 04/04/2024 NONE     Mucus, Urine 04/04/2024 1+     Urine Culture 04/04/2024 No growth     Thyroxine, Free 04/04/2024 0.81          Time Spent:    Prep time: 1 min.  Direct patient time: 30 min.  Documentation time: 7 min.  Total time: 38 min.    Next Appointment:  Follow up in about 2 months (around 10/5/2024).

## 2024-08-08 PROBLEM — F33.0 MILD EPISODE OF RECURRENT MAJOR DEPRESSIVE DISORDER (CMS-HCC): Status: ACTIVE | Noted: 2024-08-08

## 2024-08-08 RX ORDER — DEXTROAMPHETAMINE SACCHARATE, AMPHETAMINE ASPARTATE, DEXTROAMPHETAMINE SULFATE AND AMPHETAMINE SULFATE 2.5; 2.5; 2.5; 2.5 MG/1; MG/1; MG/1; MG/1
10 TABLET ORAL 2 TIMES DAILY
Qty: 60 TABLET | Refills: 0 | Status: SHIPPED | OUTPATIENT
Start: 2024-09-15 | End: 2024-10-15

## 2024-08-08 RX ORDER — DEXTROAMPHETAMINE SACCHARATE, AMPHETAMINE ASPARTATE, DEXTROAMPHETAMINE SULFATE AND AMPHETAMINE SULFATE 2.5; 2.5; 2.5; 2.5 MG/1; MG/1; MG/1; MG/1
10 TABLET ORAL 2 TIMES DAILY
Qty: 60 TABLET | Refills: 0 | Status: SHIPPED | OUTPATIENT
Start: 2024-08-16 | End: 2024-09-15

## 2024-08-08 RX ORDER — DEXTROAMPHETAMINE SACCHARATE, AMPHETAMINE ASPARTATE, DEXTROAMPHETAMINE SULFATE AND AMPHETAMINE SULFATE 2.5; 2.5; 2.5; 2.5 MG/1; MG/1; MG/1; MG/1
10 TABLET ORAL 2 TIMES DAILY
Qty: 60 TABLET | Refills: 0 | Status: SHIPPED | OUTPATIENT
Start: 2024-10-15 | End: 2024-11-14

## 2024-08-08 ASSESSMENT — ENCOUNTER SYMPTOMS: DECREASED CONCENTRATION: 1

## 2024-08-25 DIAGNOSIS — E03.9 ACQUIRED HYPOTHYROIDISM: ICD-10-CM

## 2024-08-26 RX ORDER — LEVOTHYROXINE SODIUM 50 UG/1
50 TABLET ORAL DAILY
Qty: 30 TABLET | Refills: 1 | OUTPATIENT
Start: 2024-08-26

## 2024-09-16 ENCOUNTER — OFFICE VISIT (OUTPATIENT)
Dept: ORTHOPEDIC SURGERY | Facility: CLINIC | Age: 64
End: 2024-09-16
Payer: MEDICARE

## 2024-09-16 ENCOUNTER — HOSPITAL ENCOUNTER (OUTPATIENT)
Dept: RADIOLOGY | Facility: CLINIC | Age: 64
Discharge: HOME | End: 2024-09-16
Payer: MEDICARE

## 2024-09-16 ENCOUNTER — LAB (OUTPATIENT)
Dept: LAB | Facility: LAB | Age: 64
End: 2024-09-16
Payer: MEDICARE

## 2024-09-16 VITALS — WEIGHT: 205 LBS | BODY MASS INDEX: 31.07 KG/M2 | HEIGHT: 68 IN

## 2024-09-16 DIAGNOSIS — S92.351A CLOSED FRACTURE OF FIFTH METATARSAL BONE OF RIGHT FOOT, INITIAL ENCOUNTER: ICD-10-CM

## 2024-09-16 DIAGNOSIS — M79.671 RIGHT FOOT PAIN: Primary | ICD-10-CM

## 2024-09-16 DIAGNOSIS — M79.671 RIGHT FOOT PAIN: ICD-10-CM

## 2024-09-16 DIAGNOSIS — F90.0 ATTENTION DEFICIT HYPERACTIVITY DISORDER (ADHD), PREDOMINANTLY INATTENTIVE TYPE: ICD-10-CM

## 2024-09-16 DIAGNOSIS — E03.9 ACQUIRED HYPOTHYROIDISM: ICD-10-CM

## 2024-09-16 LAB — TSH SERPL-ACNC: 2.38 MIU/L (ref 0.44–3.98)

## 2024-09-16 PROCEDURE — 73630 X-RAY EXAM OF FOOT: CPT | Mod: RIGHT SIDE | Performed by: RADIOLOGY

## 2024-09-16 PROCEDURE — 73630 X-RAY EXAM OF FOOT: CPT | Mod: RT

## 2024-09-16 PROCEDURE — 99213 OFFICE O/P EST LOW 20 MIN: CPT

## 2024-09-16 NOTE — PROGRESS NOTES
Subjective    Patient ID: Mandy Ramirez is a 63 y.o. female.    Chief Complaint: Pain of the Right Ankle (INJ)     HPI  This is a 63-year-old female presenting to the walk-in injury clinic for evaluation of a right foot injury, which occurred yesterday.  Patient states that she accidentally twisted her right foot, and immediately experienced right foot pain, swelling and limited range of motion.  States that pain is focal to the dorsal lateral foot near the head of fifth metatarsal.  She is not having any significant ankle pain or limited range of motion of the ankle.  She is able to ambulate with some difficulty, states that pain is increased when fully weightbearing on right lower extremity.  She has been elevating and applying ice.  She has been taking over-the-counter medications with minimal relief of symptoms.  Denies numbness and paresthesia of right lower extremity.  She works as .    The patient's past medical, surgical, family, and social history as well as allergies and medications were reviewed and updated in the chart.    Objective   Ortho Exam  Pleasant in no acute distress.  Walks in the office today with antalgic gait, slightly limping on right lower extremity.  Right dorsal lateral foot appearing with slight erythema and soft tissue swelling.  There is no ecchymosis noted today, skin is intact.  She is focally tender upon palpation of head of fifth metatarsal.  There is tenderness along palpation of fifth metatarsal.  No tenderness to medial or lateral malleolus.  No tenderness to Achilles tendon.  She has full range of motion of right foot and ankle, with some pain elicited to dorsal lateral foot.  She has adequate strength with resisted dorsiflexion plantarflexion.  Sensation is intact light touch.    Image Results:  Multiple view x-rays of the right foot obtained today personally reviewed, with evidence of a minimally displaced fifth metatarsal head fracture.    Assessment/Plan    Encounter Diagnoses:  Right foot pain    Closed fracture of fifth metatarsal bone of right foot, initial encounter    Plan: Discussion with patient about diagnosis of minimally displaced right fifth metatarsal head fracture with review of today's x-rays.  Explained to patient that at this time, fracture can be treated conservatively, nonoperatively.  I will take approximately 6 to 8 weeks for fracture to fully heal.  Patient requires immobilization with a postoperative shoe, which was provided to her today in the office.  She was advised to limit weightbearing over the next 10 days.  She can remove the postoperative shoe for hygiene purposes and when elevating and icing in the comfort of her home.  Advised patient that she should elevate and ice right lower extremity is much as possible.  She can take ibuprofen or Tylenol as needed to help decrease pain inflammation.  She will follow-up in 2 to 3 weeks with repeat x-rays of the right foot at that time.    Orders Placed This Encounter    XR foot right 3+ views

## 2024-09-17 DIAGNOSIS — E03.9 ACQUIRED HYPOTHYROIDISM: ICD-10-CM

## 2024-09-18 RX ORDER — LEVOTHYROXINE SODIUM 50 UG/1
50 TABLET ORAL DAILY
Qty: 90 TABLET | Refills: 1 | Status: SHIPPED | OUTPATIENT
Start: 2024-09-18 | End: 2024-09-18 | Stop reason: SDUPTHER

## 2024-09-18 RX ORDER — LEVOTHYROXINE SODIUM 50 UG/1
50 TABLET ORAL DAILY
Qty: 90 TABLET | Refills: 1 | Status: SHIPPED | OUTPATIENT
Start: 2024-09-18 | End: 2025-03-17

## 2024-09-20 LAB
AMPHET UR-MCNC: 4283 NG/ML
MDA UR-MCNC: <200 NG/ML
MDEA UR-MCNC: <200 NG/ML
MDMA UR-MCNC: <200 NG/ML
METHAMPHET UR-MCNC: <200 NG/ML
PHENTERMINE UR CFM-MCNC: <200 NG/ML

## 2024-10-03 ENCOUNTER — APPOINTMENT (OUTPATIENT)
Dept: PRIMARY CARE | Facility: CLINIC | Age: 64
End: 2024-10-03
Payer: COMMERCIAL

## 2024-10-03 VITALS
HEART RATE: 81 BPM | WEIGHT: 206 LBS | BODY MASS INDEX: 31.22 KG/M2 | HEIGHT: 68 IN | OXYGEN SATURATION: 97 % | SYSTOLIC BLOOD PRESSURE: 132 MMHG | DIASTOLIC BLOOD PRESSURE: 89 MMHG

## 2024-10-03 DIAGNOSIS — Z13.6 SCREENING FOR CARDIOVASCULAR CONDITION: ICD-10-CM

## 2024-10-03 DIAGNOSIS — Z23 NEED FOR VACCINATION: ICD-10-CM

## 2024-10-03 DIAGNOSIS — Z78.0 ASYMPTOMATIC MENOPAUSAL STATE: ICD-10-CM

## 2024-10-03 DIAGNOSIS — Z00.00 ROUTINE GENERAL MEDICAL EXAMINATION AT HEALTH CARE FACILITY: Primary | ICD-10-CM

## 2024-10-03 DIAGNOSIS — Z12.31 ENCOUNTER FOR SCREENING MAMMOGRAM FOR BREAST CANCER: ICD-10-CM

## 2024-10-03 PROBLEM — F33.0 MILD EPISODE OF RECURRENT MAJOR DEPRESSIVE DISORDER (CMS-HCC): Status: ACTIVE | Noted: 2023-04-20

## 2024-10-03 PROCEDURE — 1160F RVW MEDS BY RX/DR IN RCRD: CPT | Performed by: FAMILY MEDICINE

## 2024-10-03 PROCEDURE — 90677 PCV20 VACCINE IM: CPT | Performed by: FAMILY MEDICINE

## 2024-10-03 PROCEDURE — 90673 RIV3 VACCINE NO PRESERV IM: CPT | Performed by: FAMILY MEDICINE

## 2024-10-03 PROCEDURE — 3008F BODY MASS INDEX DOCD: CPT | Performed by: FAMILY MEDICINE

## 2024-10-03 PROCEDURE — G0009 ADMIN PNEUMOCOCCAL VACCINE: HCPCS | Performed by: FAMILY MEDICINE

## 2024-10-03 PROCEDURE — G0008 ADMIN INFLUENZA VIRUS VAC: HCPCS | Performed by: FAMILY MEDICINE

## 2024-10-03 PROCEDURE — G0403 EKG FOR INITIAL PREVENT EXAM: HCPCS | Performed by: FAMILY MEDICINE

## 2024-10-03 PROCEDURE — 1159F MED LIST DOCD IN RCRD: CPT | Performed by: FAMILY MEDICINE

## 2024-10-03 PROCEDURE — 1123F ACP DISCUSS/DSCN MKR DOCD: CPT | Performed by: FAMILY MEDICINE

## 2024-10-03 PROCEDURE — G0402 INITIAL PREVENTIVE EXAM: HCPCS | Performed by: FAMILY MEDICINE

## 2024-10-03 PROCEDURE — 99397 PER PM REEVAL EST PAT 65+ YR: CPT | Performed by: FAMILY MEDICINE

## 2024-10-03 PROCEDURE — 1036F TOBACCO NON-USER: CPT | Performed by: FAMILY MEDICINE

## 2024-10-03 PROCEDURE — 1170F FXNL STATUS ASSESSED: CPT | Performed by: FAMILY MEDICINE

## 2024-10-03 ASSESSMENT — ACTIVITIES OF DAILY LIVING (ADL)
BATHING: INDEPENDENT
MANAGING_FINANCES: INDEPENDENT
GROCERY_SHOPPING: INDEPENDENT
TAKING_MEDICATION: INDEPENDENT
DRESSING: INDEPENDENT
DOING_HOUSEWORK: INDEPENDENT

## 2024-10-03 ASSESSMENT — ENCOUNTER SYMPTOMS
DYSPHORIC MOOD: 1
WEAKNESS: 1
MYALGIAS: 1
NAUSEA: 1
NUMBNESS: 0
ARTHRALGIAS: 1
BLOOD IN STOOL: 0
RHINORRHEA: 1
DIARRHEA: 0
BACK PAIN: 1
DIZZINESS: 0
EYE PAIN: 0
HEADACHES: 0
ABDOMINAL PAIN: 1
DYSURIA: 0
FATIGUE: 0
SHORTNESS OF BREATH: 0
SORE THROAT: 0
PALPITATIONS: 0
FREQUENCY: 0
VOMITING: 0
UNEXPECTED WEIGHT CHANGE: 0
NERVOUS/ANXIOUS: 1
COUGH: 1

## 2024-10-03 ASSESSMENT — PATIENT HEALTH QUESTIONNAIRE - PHQ9
2. FEELING DOWN, DEPRESSED OR HOPELESS: NOT AT ALL
SUM OF ALL RESPONSES TO PHQ9 QUESTIONS 1 AND 2: 0
1. LITTLE INTEREST OR PLEASURE IN DOING THINGS: NOT AT ALL

## 2024-10-03 ASSESSMENT — COLUMBIA-SUICIDE SEVERITY RATING SCALE - C-SSRS
6. HAVE YOU EVER DONE ANYTHING, STARTED TO DO ANYTHING, OR PREPARED TO DO ANYTHING TO END YOUR LIFE?: NO
2. HAVE YOU ACTUALLY HAD ANY THOUGHTS OF KILLING YOURSELF?: NO
1. IN THE PAST MONTH, HAVE YOU WISHED YOU WERE DEAD OR WISHED YOU COULD GO TO SLEEP AND NOT WAKE UP?: NO

## 2024-10-03 ASSESSMENT — VISUAL ACUITY
OD_CC: 20/30
OS_CC: 20/30

## 2024-10-03 NOTE — PROGRESS NOTES
"Subjective   Reason for Visit: Mandy Ramirez is an 64 y.o. female here for a Medicare Wellness visit.     Past Medical, Surgical, and Family History reviewed and updated in chart.    Reviewed all medications by prescribing practitioner or clinical pharmacist (such as prescriptions, OTCs, herbal therapies and supplements) and documented in the medical record.    Mandy is here for her Welcome to Medicare visit.    Diet:  Healthy  Exercise:  Chasing grandkids, walking  Sleep:  Reasonable, takes trazodone  Stress:  Moderate  Smoking:  Former, quit age 39  EtOH:  1-2 drinks per week    Profession:  Retired    Dentist:  Sees regularly  Eye doctor:  Sees regularly    Last Pap:  3/24/2020, normal with negative HPV  History of abnormal Pap:  No  Mammogram:  2023  Colorectal cancer screening:  Colonoscopy 2023, hyperplastic polyp removed, repeat 10 years  DEXA scan:  2022  Vaccines due?  Needs Prevnar-20 and Flu    Sexually active:  \"Kind of\"    Menopause symptoms:  No hot flushes or night sweats  Urinary leakage:  Occasional JASON and some urgency        Patient Care Team:  Nunu Felipe MD as PCP - General (Family Medicine)  YUDITH Khan-CNP (Psychiatry)  Antony White MD as Consulting Physician (Cardiology)  Jenny Junior MD as Surgeon (Bariatrics)  Denise Bedoya MD as Surgeon (Bariatrics)     Review of Systems   Constitutional:  Negative for fatigue and unexpected weight change.   HENT:  Positive for congestion (allergies) and rhinorrhea. Negative for ear pain and sore throat.    Eyes:  Negative for pain and visual disturbance.   Respiratory:  Positive for cough (chronic). Negative for shortness of breath.    Cardiovascular:  Negative for chest pain and palpitations.   Gastrointestinal:  Positive for abdominal pain and nausea. Negative for blood in stool, diarrhea and vomiting.   Genitourinary:  Negative for dysuria, frequency, vaginal bleeding and vaginal discharge. " "  Musculoskeletal:  Positive for arthralgias (knees), back pain and myalgias.   Skin:  Negative for rash.   Neurological:  Positive for weakness (generalized). Negative for dizziness, numbness and headaches.   Psychiatric/Behavioral:  Positive for dysphoric mood. The patient is nervous/anxious.        Objective   Vitals:  /89   Pulse 81   Ht 1.727 m (5' 8\")   Wt 93.4 kg (206 lb)   SpO2 97%   BMI 31.32 kg/m²       Physical Exam  Constitutional:       General: She is not in acute distress.  HENT:      Right Ear: Tympanic membrane normal.      Left Ear: Tympanic membrane normal.   Neck:      Thyroid: No thyromegaly.   Cardiovascular:      Rate and Rhythm: Normal rate and regular rhythm.      Heart sounds: No murmur heard.  Pulmonary:      Effort: No respiratory distress.      Breath sounds: Normal breath sounds.   Chest:   Breasts:     Breasts are symmetrical.      Right: No mass, nipple discharge, skin change or tenderness.      Left: No mass, nipple discharge, skin change or tenderness.   Abdominal:      General: Bowel sounds are normal. There is no distension.      Palpations: Abdomen is soft. There is no hepatomegaly or splenomegaly.      Tenderness: There is no abdominal tenderness.   Musculoskeletal:         General: No swelling or tenderness.   Lymphadenopathy:      Cervical: No cervical adenopathy.      Upper Body:      Right upper body: No axillary adenopathy.      Left upper body: No axillary adenopathy.   Skin:     General: Skin is warm and dry.      Coloration: Skin is not jaundiced.      Findings: No rash.   Neurological:      General: No focal deficit present.      Mental Status: She is alert and oriented to person, place, and time.   Psychiatric:         Mood and Affect: Mood normal.         Behavior: Behavior normal.         Assessment & Plan  Routine general medical examination at health care facility         Screening for cardiovascular condition    Orders:    ECG 12 Lead    Need for " vaccination    Orders:    Pneumococcal vaccine 20-valent    Flu vaccine, trivalent, preservative free, no egg protein, age 18y+ (Flublok)    Asymptomatic menopausal state    Orders:    XR DEXA bone density; Future    Encounter for screening mammogram for breast cancer    Orders:    BI mammo bilateral screening tomosynthesis; Future            Patient was identified as a fall risk. Risk prevention instructions provided.

## 2024-10-03 NOTE — PATIENT INSTRUCTIONS
"Thank you for coming in to see me today, and congratulations on paying attention to staying healthy!    The single most important factor in staying healthy is eating a healthy diet.  Research has shown that the healthiest diet for humans is one rich in whole fresh plant foods.  This means most of what you eat should be fresh fruits and vegetables, whole grains, beans, nuts and seeds.  Adding meat, dairy foods and eggs does not make your food healthier (although it may make it taste better!) so you should work to minimize the amount of beef, chicken, pork, turkey, lamb, cheese and eggs you eat.  Fatty fish like salmon and tuna may be healthier alternatives.  If you would like more information please check out the website \"[place] [employer],\" and the books \"The Blue Zones\" by Doug Saucedo and \"[employer] Diet\" by Christopher Lai.    I don't like recommending \"exercise\" because that has unpleasant connotations of pain and being out of breath for many people.  Instead, I encourage my patients to find a way to move your body that you LOVE and would want to do even if it were bad for you!  Playing a fun sport like pickleball, doing yoga or kimberly chi, studying martial arts, learning to dance, even chasing your kids or grandkids around the backyard are great examples of activity that makes your heart healthier.  Remember, if you don't like it, don't do it!  There are plenty of fun options, pick one and try it out!  Aim for at least 30 minutes of activity that gets your heart revved up, most days per week.    We discussed some screening tests for you today, these tests are meant to find problems before they make you sick, when they are easier to treat and less likely to impact your health.  Depending on your age and stage of life they may include blood tests, a Pap test, a mammogram, a bone density test, a colonoscopy and others.  I can also order a test called Galleri, which is a blood test to screen for cancer.  It is not yet " covered by insurance and costs about $800, but I'm happy to order it if you would like.  If you have questions later about these or other recommended tests please call and ask!    There are a number of other things you can do to improve your health.  These may include things like   Increasing the amount of water you drink every day (aim for 1 oz of water for every 2 pounds of body weight, up to about 100 oz total)  Getting 7-8 hours of sleep every night  Avoiding smoking, drinking alcohol, and using recreational drugs    Stress management is also a very important component of staying healthy.  One of the most effective stress management tools is meditation.  I recommend everyone consider proper training in meditation - it isn't just sitting with your eyes closed and breathing.  You can find a training program in your area at [email or web address] or [email or web address].    An annual physical is a great measure to keep you as healthy as you possibly can be.  Good for you for getting that done!  See you next year :-)       Ways to Help Prevent Falls at Home    Quick Tips   ? Ask for help if you need it. Most people want to help!   ? Get up slowly after sitting or laying down   ? Wear a medical alert device or keep cell phone in your pocket   ? Use night lights, especially areas near a bathroom   ? Keep the items you use often within reach on a small stool or end table   ? Use an assistive device such as walker or cane, as directed by provider/physical therapy   ? Use a non-slip mat and grab bars in your bathroom. Look for home health sections for best options     Other Areas to Focus On   ? Exercise and nutrition: Regular exercise or taking a falls prevention class are great ways improve strength and balance. Don’t forget to stay hydrated and bring a snack!   ? Medicine side effects: Some medicines can make you sleepy or dizzy, which could cause a fall. Ask your healthcare provider about the side effects your medicines could cause. Be  sure to let them know if you take any vitamins or supplements as well.   ? Tripping hazards: Remove items you could trip on, such as loose mats, rugs, cords, and clutter. Wear closed toe shoes with rubber soles.   ? Health and wellness: Get regular checkups with your healthcare provider, plus routine vision and hearing screenings. Talk with your healthcare provider about:   o Your medicines and the possible side effects - bring them in a bag if that is easier!   o Problems with balance or feeling dizzy   o Ways to promote bone health, such as Vitamin D and calcium supplements   o Questions or concerns about falling     *Ask your healthcare team if you have questions     ©[facility], 2022

## 2024-10-07 ENCOUNTER — APPOINTMENT (OUTPATIENT)
Dept: BEHAVIORAL HEALTH | Facility: CLINIC | Age: 64
End: 2024-10-07
Payer: COMMERCIAL

## 2024-10-07 DIAGNOSIS — F41.1 GENERALIZED ANXIETY DISORDER: ICD-10-CM

## 2024-10-07 DIAGNOSIS — F42.8 OBSESSIVE THINKING: ICD-10-CM

## 2024-10-07 DIAGNOSIS — F33.8 SEASONAL DEPRESSION (CMS-HCC): ICD-10-CM

## 2024-10-07 DIAGNOSIS — F90.2 ATTENTION DEFICIT HYPERACTIVITY DISORDER (ADHD), COMBINED TYPE: ICD-10-CM

## 2024-10-07 DIAGNOSIS — F33.1 MODERATE EPISODE OF RECURRENT MAJOR DEPRESSIVE DISORDER: Primary | ICD-10-CM

## 2024-10-07 PROCEDURE — 1123F ACP DISCUSS/DSCN MKR DOCD: CPT | Performed by: NURSE PRACTITIONER

## 2024-10-07 PROCEDURE — 1160F RVW MEDS BY RX/DR IN RCRD: CPT | Performed by: NURSE PRACTITIONER

## 2024-10-07 PROCEDURE — 99214 OFFICE O/P EST MOD 30 MIN: CPT | Performed by: NURSE PRACTITIONER

## 2024-10-07 PROCEDURE — 1159F MED LIST DOCD IN RCRD: CPT | Performed by: NURSE PRACTITIONER

## 2024-10-07 PROCEDURE — 1036F TOBACCO NON-USER: CPT | Performed by: NURSE PRACTITIONER

## 2024-10-07 RX ORDER — DEXTROAMPHETAMINE SACCHARATE, AMPHETAMINE ASPARTATE, DEXTROAMPHETAMINE SULFATE AND AMPHETAMINE SULFATE 2.5; 2.5; 2.5; 2.5 MG/1; MG/1; MG/1; MG/1
10 TABLET ORAL 2 TIMES DAILY
Qty: 60 TABLET | Refills: 0 | Status: SHIPPED | OUTPATIENT
Start: 2024-11-14 | End: 2024-12-14

## 2024-10-07 RX ORDER — DEXTROAMPHETAMINE SACCHARATE, AMPHETAMINE ASPARTATE, DEXTROAMPHETAMINE SULFATE AND AMPHETAMINE SULFATE 2.5; 2.5; 2.5; 2.5 MG/1; MG/1; MG/1; MG/1
10 TABLET ORAL 2 TIMES DAILY
Qty: 60 TABLET | Refills: 0 | Status: SHIPPED | OUTPATIENT
Start: 2025-01-13 | End: 2025-02-12

## 2024-10-07 RX ORDER — DEXTROAMPHETAMINE SACCHARATE, AMPHETAMINE ASPARTATE, DEXTROAMPHETAMINE SULFATE AND AMPHETAMINE SULFATE 2.5; 2.5; 2.5; 2.5 MG/1; MG/1; MG/1; MG/1
10 TABLET ORAL 2 TIMES DAILY
Qty: 60 TABLET | Refills: 0 | Status: SHIPPED | OUTPATIENT
Start: 2024-12-14 | End: 2025-01-13

## 2024-10-07 RX ORDER — SERTRALINE HYDROCHLORIDE 50 MG/1
50 TABLET, FILM COATED ORAL DAILY
Qty: 90 TABLET | Refills: 3 | Status: SHIPPED | OUTPATIENT
Start: 2024-10-07 | End: 2025-10-07

## 2024-10-07 ASSESSMENT — ANXIETY QUESTIONNAIRES
IF YOU CHECKED OFF ANY PROBLEMS ON THIS QUESTIONNAIRE, HOW DIFFICULT HAVE THESE PROBLEMS MADE IT FOR YOU TO DO YOUR WORK, TAKE CARE OF THINGS AT HOME, OR GET ALONG WITH OTHER PEOPLE: SOMEWHAT DIFFICULT
4. TROUBLE RELAXING: NOT AT ALL
5. BEING SO RESTLESS THAT IT IS HARD TO SIT STILL: SEVERAL DAYS
2. NOT BEING ABLE TO STOP OR CONTROL WORRYING: SEVERAL DAYS
3. WORRYING TOO MUCH ABOUT DIFFERENT THINGS: MORE THAN HALF THE DAYS
1. FEELING NERVOUS, ANXIOUS, OR ON EDGE: SEVERAL DAYS
7. FEELING AFRAID AS IF SOMETHING AWFUL MIGHT HAPPEN: NOT AT ALL
GAD7 TOTAL SCORE: 5
6. BECOMING EASILY ANNOYED OR IRRITABLE: NOT AT ALL

## 2024-10-07 ASSESSMENT — PATIENT HEALTH QUESTIONNAIRE - PHQ9
9. THOUGHTS THAT YOU WOULD BE BETTER OFF DEAD, OR OF HURTING YOURSELF: NOT AT ALL
7. TROUBLE CONCENTRATING ON THINGS, SUCH AS READING THE NEWSPAPER OR WATCHING TELEVISION: NEARLY EVERY DAY
5. POOR APPETITE OR OVEREATING: SEVERAL DAYS
10. IF YOU CHECKED OFF ANY PROBLEMS, HOW DIFFICULT HAVE THESE PROBLEMS MADE IT FOR YOU TO DO YOUR WORK, TAKE CARE OF THINGS AT HOME, OR GET ALONG WITH OTHER PEOPLE: VERY DIFFICULT
1. LITTLE INTEREST OR PLEASURE IN DOING THINGS: MORE THAN HALF THE DAYS
6. FEELING BAD ABOUT YOURSELF - OR THAT YOU ARE A FAILURE OR HAVE LET YOURSELF OR YOUR FAMILY DOWN: NEARLY EVERY DAY
8. MOVING OR SPEAKING SO SLOWLY THAT OTHER PEOPLE COULD HAVE NOTICED. OR THE OPPOSITE, BEING SO FIGETY OR RESTLESS THAT YOU HAVE BEEN MOVING AROUND A LOT MORE THAN USUAL: SEVERAL DAYS
4. FEELING TIRED OR HAVING LITTLE ENERGY: MORE THAN HALF THE DAYS
3. TROUBLE FALLING OR STAYING ASLEEP OR SLEEPING TOO MUCH: NOT AT ALL
2. FEELING DOWN, DEPRESSED OR HOPELESS: MORE THAN HALF THE DAYS

## 2024-10-07 ASSESSMENT — ENCOUNTER SYMPTOMS
DYSPHORIC MOOD: 1
DECREASED CONCENTRATION: 1
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
## 2024-10-28 ENCOUNTER — HOSPITAL ENCOUNTER (OUTPATIENT)
Dept: RADIOLOGY | Facility: CLINIC | Age: 64
Discharge: HOME | End: 2024-10-28
Payer: MEDICARE

## 2024-10-28 VITALS — BODY MASS INDEX: 31.21 KG/M2 | WEIGHT: 205.91 LBS | HEIGHT: 68 IN

## 2024-10-28 DIAGNOSIS — Z12.31 ENCOUNTER FOR SCREENING MAMMOGRAM FOR BREAST CANCER: ICD-10-CM

## 2024-10-28 DIAGNOSIS — Z78.0 ASYMPTOMATIC MENOPAUSAL STATE: ICD-10-CM

## 2024-10-28 PROCEDURE — 77063 BREAST TOMOSYNTHESIS BI: CPT

## 2024-10-28 PROCEDURE — 77080 DXA BONE DENSITY AXIAL: CPT

## 2024-10-28 PROCEDURE — 77080 DXA BONE DENSITY AXIAL: CPT | Performed by: RADIOLOGY

## 2024-10-28 PROCEDURE — 77067 SCR MAMMO BI INCL CAD: CPT | Performed by: RADIOLOGY

## 2024-10-28 PROCEDURE — 77063 BREAST TOMOSYNTHESIS BI: CPT | Performed by: RADIOLOGY

## 2024-10-29 ENCOUNTER — TELEMEDICINE (OUTPATIENT)
Dept: PRIMARY CARE | Facility: CLINIC | Age: 64
End: 2024-10-29
Payer: MEDICARE

## 2024-10-29 ENCOUNTER — TELEPHONE (OUTPATIENT)
Dept: PRIMARY CARE | Facility: CLINIC | Age: 64
End: 2024-10-29

## 2024-10-29 DIAGNOSIS — M81.0 AGE-RELATED OSTEOPOROSIS WITHOUT CURRENT PATHOLOGICAL FRACTURE: Primary | ICD-10-CM

## 2024-10-29 PROCEDURE — 1160F RVW MEDS BY RX/DR IN RCRD: CPT | Performed by: FAMILY MEDICINE

## 2024-10-29 PROCEDURE — 99213 OFFICE O/P EST LOW 20 MIN: CPT | Performed by: FAMILY MEDICINE

## 2024-10-29 PROCEDURE — 1123F ACP DISCUSS/DSCN MKR DOCD: CPT | Performed by: FAMILY MEDICINE

## 2024-10-29 PROCEDURE — 1036F TOBACCO NON-USER: CPT | Performed by: FAMILY MEDICINE

## 2024-10-29 PROCEDURE — 1159F MED LIST DOCD IN RCRD: CPT | Performed by: FAMILY MEDICINE

## 2024-10-29 RX ORDER — CALCIUM CARBONATE 300MG(750)
400 TABLET,CHEWABLE ORAL DAILY
COMMUNITY

## 2024-10-29 RX ORDER — FERROUS SULFATE, DRIED 160(50) MG
1 TABLET, EXTENDED RELEASE ORAL 2 TIMES DAILY
COMMUNITY

## 2024-10-29 RX ORDER — VIT C/E/ZN/COPPR/LUTEIN/ZEAXAN 250MG-90MG
50 CAPSULE ORAL DAILY
Qty: 60 CAPSULE | Refills: 0 | Status: SHIPPED | OUTPATIENT
Start: 2024-10-29

## 2024-10-29 RX ORDER — BISMUTH SUBSALICYLATE 262 MG
1 TABLET,CHEWABLE ORAL DAILY
COMMUNITY

## 2024-10-29 ASSESSMENT — ENCOUNTER SYMPTOMS
ABDOMINAL PAIN: 0
PALPITATIONS: 0
COUGH: 0
UNEXPECTED WEIGHT CHANGE: 0
SHORTNESS OF BREATH: 0
FATIGUE: 0

## 2024-11-07 ENCOUNTER — APPOINTMENT (OUTPATIENT)
Dept: BEHAVIORAL HEALTH | Facility: CLINIC | Age: 64
End: 2024-11-07
Payer: MEDICARE

## 2024-11-07 DIAGNOSIS — F51.04 PSYCHOPHYSIOLOGICAL INSOMNIA: ICD-10-CM

## 2024-11-07 DIAGNOSIS — F33.8 SEASONAL DEPRESSION (CMS-HCC): ICD-10-CM

## 2024-11-07 DIAGNOSIS — F42.8 OBSESSIVE THINKING: ICD-10-CM

## 2024-11-07 DIAGNOSIS — F33.1 MODERATE EPISODE OF RECURRENT MAJOR DEPRESSIVE DISORDER: ICD-10-CM

## 2024-11-07 DIAGNOSIS — F41.1 GENERALIZED ANXIETY DISORDER: ICD-10-CM

## 2024-11-07 DIAGNOSIS — F90.2 ATTENTION DEFICIT HYPERACTIVITY DISORDER (ADHD), COMBINED TYPE: Primary | ICD-10-CM

## 2024-11-07 RX ORDER — DEXTROAMPHETAMINE SACCHARATE, AMPHETAMINE ASPARTATE, DEXTROAMPHETAMINE SULFATE AND AMPHETAMINE SULFATE 5; 5; 5; 5 MG/1; MG/1; MG/1; MG/1
20 TABLET ORAL 2 TIMES DAILY
Qty: 60 TABLET | Refills: 0 | Status: SHIPPED | OUTPATIENT
Start: 2024-11-15 | End: 2024-12-15

## 2024-11-07 ASSESSMENT — ENCOUNTER SYMPTOMS
DYSPHORIC MOOD: 1
DECREASED CONCENTRATION: 1

## 2024-11-07 NOTE — PROGRESS NOTES
"Adult Ambulatory Psychiatry Progress Note      Assessment/Plan     Impression:  Mandy Ramirez is a 64 y.o. female domiciled  with adult children and grandchildren, employed as a  for a local business who presents for follow up with CC of \"I am upset. The results are disgusting. I know people whom voted for him and the party. This country has just given away it's democracy.\"    Plan: Patient was cooperative yet very anxious, reserved and tied it to the results of the election, worrying about the future of the country. However endorses noticing ongoing issues with ADHD symptoms, especially affecting her work, and after reviewing agreed to increasing dose of Adderall. Feels at this time increased Zoloft has not had an impact on her mood, understanding the outcomes of the election is overwhelming her emotionally currently.; Reviewed and agreed to leaving other medications and treatment plan in place.    Medication: Zoloft 150mg every day, Increases Adderall 10mg to 20mg twice daily, Wellbutrin XL 450mg every day. Trazodone 200mg at bedtime.    Reviewed r/b/a, possible side effects of the medication. Client is aware about the benefit outweighs the risk.     Diagnoses and all orders for this visit:  Attention deficit hyperactivity disorder (ADHD), combined type  -     amphetamine-dextroamphetamine (AdderalL) 20 mg tablet; Take 1 tablet (20 mg) by mouth 2 times a day. Do not fill before November 15, 2024.  Generalized anxiety disorder  Obsessive thinking  Moderate episode of recurrent major depressive disorder  Seasonal depression (CMS-HCC)  Psychophysiological insomnia        Therapy: n/a    Other: n/a    Patient is reminded that if there is SI to call [phone] and get themselves to the closest ED for evaluation, otherwise contact me for other questions/concerns.     Subjective   HPI:  \"Both the patient, and family and caregivers and guardians as appropriate, were informed of the current need to conduct " "treatment via telephone. I have confirmed the patient's identity via the following (minimum of three) acceptable identifiers as per  Policy PH-9: [place] , home address.\"     Virtual Consent    An interactive audio and video telecommunication system which permits real time communications between the patient (at work) and provider (at home office) was utilized to provide this telehealth service.   Verbal consent was requested and obtained from Mandy Ramirez on this date, 24 for a telehealth visit.     Present Illness - depression, anxiety, ADHD     Characteristics/Recent psychiatric symptoms (pertinent positives and negatives) - reports making effort to not dwell on the results of the election, but is upset, saddened by, angered, anxious and 'disgusted' by people who voted for [employer]. \"People normalized him and his behaviors and he is shocking and he is a narcissist.\" Admits to being confused by how the number of registered voters went up but the number of actual people whom voted didn't vote - 'none of this makes sense.' Reports her adult daughter is 'driving me crazy. She is raising her voice at me and I called her out about that, and it was all about my giving her daughter a party, but she invited her ex, their father to the party, which was upsetting and I have done this every year.' Reports her grandkids are doing 'fine' emotionally and academically but her daughter is 'so unreasonable and argumentative and I don't want to fight her, and I am tired of being told that I am an enabler which I am, and I need to stop.' Reports depression is there as well and knows that is muddied between her worrying for her daughter and the election and its outcome. Endorses feeling down, hopeless and more anhedonia. Reports sleep is still 5-6 hrs with Trazodone during week nights but on weekends will take higher dose and gets 8 hours as she can sleep in. Reports ADHD is stable. Reports racing, obsessive, ruminating " thoughts are 'there but they are tamed.' Reports appetite is 'stable.' Reports noticing energy levels are 'low and feeling mental and physical fatigue.' Reports possible seasonal depression as she ties it to knowing it is fall and leading into winter but more so from the perspective of 'change and how difficult it can be'.      Onset/timeframe - 1 month  Type - depression, concentration, anxiety  Duration - daily  Aggravating and/or relieving factors/triggers - upset with election results, leaving her feeling on edge, upset, seasonal depression on the periphery and worrying about 'change' for the future of the country in a negative direction, but also noticing increased issues with concentration especially at work.  Treatment and treatment changes (new meds, dosage increases or decreases, med compliance, therapy frequency, etc.) (Past and Recent) - Zoloft 125mg QD, Wellbtutrin XL 450mg QD, Trazodone 200mg @[facility]. Adderall IR 10mg BID.      Issues: Denies SI/HI/AVH, currently.       Review of Systems   Endocrine:        Thyroid issues, hair thinning   Neurological:         Memory   Psychiatric/Behavioral:  Positive for decreased concentration and dysphoric mood. The patient is nervous/anxious.    All other systems reviewed and are negative.      OARRS:  Olivier Stokes, APRN-CNP on 11/7/2024  3:44 PM  I have personally reviewed the OARRS report for Mandy Ramirez. I have considered the risks of abuse, dependence, addiction and diversion    Is the patient prescribed a combination of a benzodiazepine and opioid?  No    Last Urine Drug Screen / ordered today: No  Recent Results (from the past 8760 hours)   Amphetamine Confirm, Urine    Collection Time: 09/16/24  4:39 PM   Result Value Ref Range    Methamphetamine Quant, Ur <200 ng/mL    MDA, Urine <200 ng/mL    MDEA, Urine <200 ng/mL    Phentermine,Urine <200 ng/mL    Amphetamines,Urine 4283 ng/mL    MDMA, Urine <200 ng/mL     Results are as expected.         Controlled  Substance Agreement:  Date of the Last Agreement: 02/03/2024  Reviewed Controlled Substance Agreement including but not limited to the benefits, risks, and alternatives to treatment with a Controlled Substance medication(s).    Stimulants:   What is the patient's goal of therapy? Stable attention  Is this being achieved with current treatment? no    Activities of Daily Living:   Is your overall impression that this patient is benefiting (symptom reduction outweighs side effects) from stimulant therapy? No     1. Physical Functioning: Better  2. Family Relationship: Worse  3. Social Relationship: Worse  4. Mood: Worse  5. Sleep Patterns: Same  6. Overall Function: Worse      Objective   Mental Status Exam:  General Appearance: Well groomed, appropriate eye contact  Attitude/Behavior: Cooperative, Guarded, Distracted, Fair eye contact  Motor: No psychomotor agitation or retardation, no tremor or other abnormal movements  Speech: Normal rate, volume, prosody, Other: (comment) (perseverative)  Gait/Station: Other:(comment) (sitting in front of computer at work, over virtual connection)  Mood: 'very upset'  Affect: Dysphoric, constricted, Anxious, Increased intensity, Congruent with mood and topic of conversation  Thought Process: Perseverative (racing, obsessive)  Thought Associations: No loosening of associations  Thought Content: Other: (comment), Normal (very upset, angry, irritable, anxious, saddened about outcome of election but also realized concentration troubles have become a struggle for her with work especially.)  Perception: No perceptual abnormalities noted  Sensorium: Alert and oriented to person, place, time and situation  Insight: Intact  Judgement: Intact  Cognition: Cognitively intact to conversational testing with respect to attention, orientation, fund of knowledge, recent and remote memory, and language  Testing: N/A    SATISH-7/PHQ-9 scores reviewed: 5, 13 compared to 9, 11 reflecting further  improvement in anxiety but a small increase in depression.     Current Medications:  Current Outpatient Medications on File Prior to Visit   Medication Sig Dispense Refill    aluminum hydrox-magnesium carb (Gaviscon Extra Strength) 160-105 mg tablet,chewable Chew 1 tablet if needed (acid reflux).      amphetamine-dextroamphetamine (Adderall) 10 mg tablet Take 1 tablet (10 mg) by mouth 2 times a day. Do not fill before October 15, 2024. 60 tablet 0    buPROPion XL (Wellbutrin XL) 150 mg 24 hr tablet TAKE 1 TABLET BY MOUTH EVERY DAY 90 tablet 0    buPROPion XL (Wellbutrin XL) 300 mg 24 hr tablet TAKE 1 TABLET BY MOUTH EVERY DAY 90 tablet 0    calcium carbonate-vitamin D3 500 mg-5 mcg (200 unit) tablet Take 1 tablet by mouth 2 times a day.      cholecalciferol (Vitamin D-3) 25 MCG (1000 UT) capsule Take 2 capsules (50 mcg) by mouth once daily. 60 capsule 0    fluoride, sodium, 1.1 % gel Apply to teeth.      levothyroxine (Synthroid, Levoxyl) 50 mcg tablet Take 1 tablet (50 mcg) by mouth once daily. 90 tablet 1    magnesium oxide (Mag-Ox) 400 mg tablet Take 1 tablet (400 mg) by mouth once daily.      moisturizing mouth (Biotene Dry Mouth Oral Rinse) solution Take by mouth.      multivitamin tablet Take 1 tablet by mouth once daily.      omega 3-dha-epa-fish oil (Fish OiL) 1,000 mg (120 mg-180 mg) capsule Take by mouth.      omeprazole (PriLOSEC) 40 mg DR capsule Take 1 capsule (40 mg) by mouth once daily in the morning. Take before meals. Do not crush or chew. Open capsule, sprinkle beads on SF jello, pudding or applesauce. 90 capsule 1    sertraline (Zoloft) 100 mg tablet Take 1 tablet (100 mg) by mouth once daily. 90 tablet 3    sertraline (Zoloft) 50 mg tablet Take 1 tablet (50 mg) by mouth once daily. 90 tablet 3    traZODone (Desyrel) 100 mg tablet TAKE 1 TABLET BEDTIME AS DIRECTED (MAY TAKE UP TO 2 TABS AT BEDTIME FOR SLEEP) 180 tablet 0    [DISCONTINUED] amphetamine-dextroamphetamine (Adderall) 10 mg tablet Take  1 tablet (10 mg) by mouth 2 times a day. Do not fill before December 14, 2024. 60 tablet 0    [DISCONTINUED] amphetamine-dextroamphetamine (Adderall) 10 mg tablet Take 1 tablet (10 mg) by mouth 2 times a day. Do not fill before January 13, 2025. 60 tablet 0    [DISCONTINUED] amphetamine-dextroamphetamine (Adderall) 10 mg tablet Take 1 tablet (10 mg) by mouth 2 times a day. Do not fill before September 15, 2024. 60 tablet 0    [DISCONTINUED] amphetamine-dextroamphetamine (Adderall) 10 mg tablet Take 1 tablet (10 mg) by mouth 2 times a day. Do not fill before November 14, 2024. (Patient not taking: Reported on 11/9/2024 Do not fill before November 14, 2024.) 60 tablet 0     No current facility-administered medications on file prior to visit.       Lab Review:   Lab on 09/16/2024   Component Date Value    Thyroid Stimulating Horm* 09/16/2024 2.38     Methamphetamine Quant, Ur 09/16/2024 <200     MDA, Urine 09/16/2024 <200     MDEA, Urine 09/16/2024 <200     Phentermine,Urine 09/16/2024 <200     Amphetamines,Urine 09/16/2024 4283     MDMA, Urine 09/16/2024 <200    Office Visit on 07/03/2024   Component Date Value    POC HEMOGLOBIN A1c 07/03/2024 5.3    Lab Requisition on 05/20/2024   Component Date Value    Glucose 05/20/2024 129 (H)     Sodium 05/20/2024 139     Potassium 05/20/2024 4.0     Chloride 05/20/2024 102     Bicarbonate 05/20/2024 23     Anion Gap 05/20/2024 18     Urea Nitrogen 05/20/2024 11     Creatinine 05/20/2024 0.65     eGFR 05/20/2024 >90     Calcium 05/20/2024 9.4     Albumin 05/20/2024 4.2     Bilirubin, Total 05/20/2024 0.5     Bilirubin, Direct 05/20/2024 0.1     Alkaline Phosphatase 05/20/2024 89     ALT 05/20/2024 16     AST 05/20/2024 16     Total Protein 05/20/2024 6.9     WBC 05/20/2024 10.0     nRBC 05/20/2024 0.0     RBC 05/20/2024 4.55     Hemoglobin 05/20/2024 13.9     Hematocrit 05/20/2024 43.7     MCV 05/20/2024 96     MCH 05/20/2024 30.5     MCHC 05/20/2024 31.8 (L)     RDW 05/20/2024  15.1 (H)     Platelets 05/20/2024 347     Neutrophils % 05/20/2024 52.1     Immature Granulocytes %,* 05/20/2024 0.4     Lymphocytes % 05/20/2024 28.4     Monocytes % 05/20/2024 8.7     Eosinophils % 05/20/2024 9.5     Basophils % 05/20/2024 0.9     Neutrophils Absolute 05/20/2024 5.23     Immature Granulocytes Ab* 05/20/2024 0.04     Lymphocytes Absolute 05/20/2024 2.85     Monocytes Absolute 05/20/2024 0.87     Eosinophils Absolute 05/20/2024 0.95 (H)     Basophils Absolute 05/20/2024 0.09     Creatine Kinase 05/20/2024 46     Bilirubin, Direct 05/20/2024 0.1    Lab Requisition on 05/13/2024   Component Date Value    Glucose 05/13/2024 104 (H)     Sodium 05/13/2024 139     Potassium 05/13/2024 4.1     Chloride 05/13/2024 104     Bicarbonate 05/13/2024 27     Anion Gap 05/13/2024 12     Urea Nitrogen 05/13/2024 10     Creatinine 05/13/2024 0.76     eGFR 05/13/2024 88     Calcium 05/13/2024 8.8     Albumin 05/13/2024 3.9     Alkaline Phosphatase 05/13/2024 71     Total Protein 05/13/2024 6.3 (L)     AST 05/13/2024 38     Bilirubin, Total 05/13/2024 0.4     ALT 05/13/2024 26     Creatine Kinase 05/13/2024 379 (H)     WBC 05/13/2024 8.5     nRBC 05/13/2024 0.0     RBC 05/13/2024 4.22     Hemoglobin 05/13/2024 13.1     Hematocrit 05/13/2024 39.6     MCV 05/13/2024 94     MCH 05/13/2024 31.0     MCHC 05/13/2024 33.1     RDW 05/13/2024 14.7 (H)     Platelets 05/13/2024 396     Neutrophils % 05/13/2024 55.5     Immature Granulocytes %,* 05/13/2024 0.4     Lymphocytes % 05/13/2024 19.8     Monocytes % 05/13/2024 10.1     Eosinophils % 05/13/2024 13.1     Basophils % 05/13/2024 1.1     Neutrophils Absolute 05/13/2024 4.73     Immature Granulocytes Ab* 05/13/2024 0.03     Lymphocytes Absolute 05/13/2024 1.69     Monocytes Absolute 05/13/2024 0.86     Eosinophils Absolute 05/13/2024 1.12 (H)     Basophils Absolute 05/13/2024 0.09    Lab on 05/09/2024   Component Date Value    Zinc, Serum or Plasma 05/09/2024 72.8     Vitamin  B12 05/09/2024 1,857 (H)     Vitamin A (Retinol) 05/09/2024 0.67     Vitamin A (Retinyl Palmi* 05/09/2024 <0.02     Vitamin A, Interpretation 05/09/2024 Normal     Vitamin K 05/09/2024 0.64     Thyroid Stimulating Horm* 05/09/2024 3.90     Ferritin 05/09/2024 380 (H)     Vitamin B6 05/09/2024 198.3 (H)     Iron 05/09/2024 49     UIBC 05/09/2024 190     TIBC 05/09/2024 239 (L)     % Saturation 05/09/2024 21 (L)     Cholesterol 05/09/2024 218 (H)     HDL-Cholesterol 05/09/2024 66.3     Cholesterol/HDL Ratio 05/09/2024 3.3     LDL Calculated 05/09/2024 121 (H)     VLDL 05/09/2024 31     Triglycerides 05/09/2024 156 (H)     Non HDL Cholesterol 05/09/2024 152 (H)     Glucose 05/09/2024 109 (H)     Sodium 05/09/2024 140     Potassium 05/09/2024 4.1     Chloride 05/09/2024 103     Bicarbonate 05/09/2024 28     Anion Gap 05/09/2024 13     Urea Nitrogen 05/09/2024 6     Creatinine 05/09/2024 0.90     eGFR 05/09/2024 72     Calcium 05/09/2024 9.4     Albumin 05/09/2024 4.2     Alkaline Phosphatase 05/09/2024 78     Total Protein 05/09/2024 6.9     AST 05/09/2024 53 (H)     Bilirubin, Total 05/09/2024 0.5     ALT 05/09/2024 29     Vitamin D, 25-Hydroxy, T* 05/09/2024 60     Parathyroid Hormone, Int* 05/09/2024 54.0     Vitamin B1, Whole Blood 05/09/2024 166     Folate, Serum 05/09/2024 7.6     Copper 05/09/2024 140.2     WBC 05/09/2024 10.3     nRBC 05/09/2024 0.0     RBC 05/09/2024 4.31     Hemoglobin 05/09/2024 13.0     Hematocrit 05/09/2024 40.2     MCV 05/09/2024 93     MCH 05/09/2024 30.2     MCHC 05/09/2024 32.3     RDW 05/09/2024 14.7 (H)     Platelets 05/09/2024 565 (H)     Creatine Kinase 05/09/2024 699 (H)     Bilirubin, Direct 05/09/2024 0.1          Time Spent:    Prep time: 1 min.  Direct patient time: 31 min.  Documentation time: 6 min.  Total time: 38 min.    Next Appointment:  Follow up in 5 weeks (on 12/11/2024).

## 2024-11-09 ASSESSMENT — ENCOUNTER SYMPTOMS: NERVOUS/ANXIOUS: 1

## 2024-12-11 ENCOUNTER — APPOINTMENT (OUTPATIENT)
Dept: BEHAVIORAL HEALTH | Facility: CLINIC | Age: 64
End: 2024-12-11
Payer: MEDICARE

## 2024-12-11 DIAGNOSIS — F51.04 PSYCHOPHYSIOLOGICAL INSOMNIA: ICD-10-CM

## 2024-12-11 DIAGNOSIS — F90.2 ATTENTION DEFICIT HYPERACTIVITY DISORDER (ADHD), COMBINED TYPE: Primary | ICD-10-CM

## 2024-12-11 DIAGNOSIS — F41.1 GENERALIZED ANXIETY DISORDER: ICD-10-CM

## 2024-12-11 DIAGNOSIS — F42.8 OBSESSIVE THINKING: ICD-10-CM

## 2024-12-11 DIAGNOSIS — F33.2 SEVERE EPISODE OF RECURRENT MAJOR DEPRESSIVE DISORDER, WITHOUT PSYCHOTIC FEATURES (MULTI): ICD-10-CM

## 2024-12-11 DIAGNOSIS — F33.1 MODERATE EPISODE OF RECURRENT MAJOR DEPRESSIVE DISORDER: ICD-10-CM

## 2024-12-11 PROCEDURE — 1160F RVW MEDS BY RX/DR IN RCRD: CPT | Performed by: NURSE PRACTITIONER

## 2024-12-11 PROCEDURE — 1123F ACP DISCUSS/DSCN MKR DOCD: CPT | Performed by: NURSE PRACTITIONER

## 2024-12-11 PROCEDURE — 99214 OFFICE O/P EST MOD 30 MIN: CPT | Performed by: NURSE PRACTITIONER

## 2024-12-11 PROCEDURE — 1159F MED LIST DOCD IN RCRD: CPT | Performed by: NURSE PRACTITIONER

## 2024-12-11 RX ORDER — SERTRALINE HYDROCHLORIDE 100 MG/1
150 TABLET, FILM COATED ORAL DAILY
Qty: 135 TABLET | Refills: 3 | Status: SHIPPED | OUTPATIENT
Start: 2024-12-11 | End: 2025-12-11

## 2024-12-11 ASSESSMENT — ENCOUNTER SYMPTOMS
DECREASED CONCENTRATION: 1
DYSPHORIC MOOD: 1
NERVOUS/ANXIOUS: 1

## 2024-12-11 NOTE — PROGRESS NOTES
"Adult Ambulatory Psychiatry Progress Note      Assessment/Plan     Impression:  Mandy Ramirez is a 64 y.o. female domiciled  with adult children and grandchildren, employed as a  for a local business who presents for follow up with CC of \"I am doing ok. I am glad with taking my meds, I am able to let things roll off my back, instead of getting upset, otherwise my anxiety would be through the roof. However I am struggling with memory issues and that bothers me a lot. I keep forgetting and that is impacting my job. I feel the increased Adderall is helping with my focus but my memory is so bad.\"    Plan: Patient was cooperative yet very anxious, reserved and now ties it to issues with her daughter, lingering concerns with the election, but now notices she is struggling with staying on top of work. Discussed having her set limits with her daughter, and start using techniques outlined with addressing ADHD Paralysis to help her overcome the issues with concentration as dose increase in Adderall had no impact on attention/concentration. Reviewed and agreed to leaving medications and treatment plan in place.    Medication: Zoloft 150mg every day, Adderall 20mg twice daily, Wellbutrin XL 450mg every day. Trazodone 200mg at bedtime.    Reviewed r/b/a, possible side effects of the medication. Client is aware about the benefit outweighs the risk.     Diagnoses and all orders for this visit:  Attention deficit hyperactivity disorder (ADHD), combined type  -     amphetamine-dextroamphetamine (Adderall) 20 mg tablet; Take 1 tablet (20 mg) by mouth 2 times a day.  -     amphetamine-dextroamphetamine (Adderall) 20 mg tablet; Take 1 tablet (20 mg) by mouth 2 times a day. Do not fill before January 14, 2025.  -     amphetamine-dextroamphetamine (Adderall) 20 mg tablet; Take 1 tablet (20 mg) by mouth 2 times a day. Do not fill before February 13, 2025.  Generalized anxiety disorder  -     sertraline (Zoloft) 100 mg " tablet; Take 1.5 tablets (150 mg) by mouth once daily.  Obsessive thinking  -     sertraline (Zoloft) 100 mg tablet; Take 1.5 tablets (150 mg) by mouth once daily.  Severe episode of recurrent major depressive disorder, without psychotic features ([facility])  -     sertraline (Zoloft) 100 mg tablet; Take 1.5 tablets (150 mg) by mouth once daily.  Moderate episode of recurrent major depressive disorder  Psychophysiological insomnia          Therapy: n/a    Other: n/a    Patient is reminded that if there is SI to call [phone] and get themselves to the closest ED for evaluation, otherwise contact me for other questions/concerns.     Subjective   HPI:     Virtual Consent    An interactive audio and video telecommunication system which permits real time communications between the patient (at work) and provider (at home office) was utilized to provide this telehealth service.   Verbal consent was requested and obtained from Mandy Ramirez on this date, 12/11/24 for a telehealth visit.     Present Illness - depression, anxiety, ADHD     Characteristics/Recent psychiatric symptoms (pertinent positives and negatives) - reports her anxiety is triggered by her daughter and her moodiness and then 'she will say I love you and blah blah blah' and she is all over the place and making poor decisions. Reports her daughter still hasn't sold her home and not . Admits that her daughter 'uses me. Even my son and daughter in law see this behavior and that I am allowing her to act this.' Reports her daughter is very irresponsible with money, had to buy a new car and now not making payments. Her daughter is living with a wonderful man, whom the patient had given him the access to control a credit card that was under the patient's name, which he was handling well until the patient saw purchases that were adding up quickly and so had to cut the access off to all the credit and money and now has a $10,000 debt that the patient is now  responsible for. Reports her grandkids (daughter's kids) are the patient's world and her daughter would withhold visits with her, using them as collateral against the patient her to manipulate her to do her bidding. Reports missing her happy, living daughter and also feels sad because of how her daughter uses her and keeps getting 'sucked' back into her issues and lack of self-responsibility. Reports depression is there but with medication and knowing she is able to compartmentalize her feelings, at the same time. Feels she is able to tap into her emotions healthily. Reports sleep is still 5-6 hrs with Trazodone during week nights but on weekends will take higher dose and gets 8 hours of sleep. Reports ADHD is stable if not a little better on higher dose, but notices her memory issues are ongoing and cannot retain anything and reveals she just has to much on her plate, especially with the holidays and her daughter. Still admits to having ADHD paralysis issues tied to task initiation, figuring out what to do next, and finishing tasks. Reports racing, obsessive, ruminating thoughts are 'there.' Reports appetite is 'stable.' Reports noticing energy levels are 'low and feeling mental and physical fatigue.' Reports possible seasonal depression d/t lack of sun and early darkness but notices how her increased stress in her life lately, is bringing her mood down more.       Onset/timeframe - 1 month  Type - depression, concentration, anxiety  Duration - daily  Aggravating and/or relieving factors/triggers - seasonal depression on the periphery but now feeling overwhelmed by issues with her relationship with her daughter and not sure of how proceed, along with feeling like she cannot concentrate at work d/t having too many things on her mind.   Treatment and treatment changes (new meds, dosage increases or decreases, med compliance, therapy frequency, etc.) (Past and Recent) - Zoloft 125mg QD, Wellbtutrin XL 450mg QD,  Trazodone 200mg @[facility]. Adderall IR 10mg BID.      Issues: Denies SI/HI/AVH, currently.       Review of Systems   Endocrine:        Thyroid issues, hair thinning   Neurological:         Memory   Psychiatric/Behavioral:  Positive for decreased concentration and dysphoric mood. The patient is nervous/anxious.    All other systems reviewed and are negative.      OARRS:  Olivier Stokes, APRN-CNP on 12/11/2024 12:13 PM  I have personally reviewed the OARRS report for Mandy Ramirez. I have considered the risks of abuse, dependence, addiction and diversion    Is the patient prescribed a combination of a benzodiazepine and opioid?  No    Last Urine Drug Screen / ordered today: No  Recent Results (from the past 8760 hours)   Amphetamine Confirm, Urine    Collection Time: 09/16/24  4:39 PM   Result Value Ref Range    Methamphetamine Quant, Ur <200 ng/mL    MDA, Urine <200 ng/mL    MDEA, Urine <200 ng/mL    Phentermine,Urine <200 ng/mL    Amphetamines,Urine 4283 ng/mL    MDMA, Urine <200 ng/mL     Results are as expected.         Controlled Substance Agreement:  Date of the Last Agreement: 02/03/2024  Reviewed Controlled Substance Agreement including but not limited to the benefits, risks, and alternatives to treatment with a Controlled Substance medication(s).    Stimulants:   What is the patient's goal of therapy? Stable attention  Is this being achieved with current treatment? no    Activities of Daily Living:   Is your overall impression that this patient is benefiting (symptom reduction outweighs side effects) from stimulant therapy? No     1. Physical Functioning: Better  2. Family Relationship: Same  3. Social Relationship: Same  4. Mood: Same  5. Sleep Patterns: Same  6. Overall Function: Same      Objective   Mental Status Exam:  General Appearance: Well groomed, appropriate eye contact  Attitude/Behavior: Cooperative, Distracted  Motor: No psychomotor agitation or retardation, no tremor or other abnormal  movements  Speech: Other: (comment), Normal rate, volume, prosody (perseverative)  Gait/Station: Other:(comment) (sitting in front of work computer at work, over virtual connection)  Mood: 'just ok'  Affect: Dysphoric, constricted but reactive, Anxious, Blunted, Increased intensity, Congruent with mood and topic of conversation  Thought Process: Perseverative, Flight of ideas (obsessive)  Thought Associations: No loosening of associations  Thought Content: Other: (comment) (can't think straight/losing focus, overwhelmed by multiple issues at work and home and finds herself falling behind, and just not setting boundaries with her adult daughter)  Perception: No perceptual abnormalities noted  Sensorium: Alert and oriented to person, place, time and situation  Insight: Fair  Judgement: Fair  Cognition: Cognitively intact to conversational testing with respect to attention, orientation, fund of knowledge, recent and remote memory, and language  Testing: N/A    SATISH-7/PHQ-9 scores reviewed: 5, 13 compared to 9, 11 reflecting further improvement in anxiety but a small increase in depression.     Current Medications:  Current Outpatient Medications on File Prior to Visit   Medication Sig Dispense Refill    aluminum hydrox-magnesium carb (Gaviscon Extra Strength) 160-105 mg tablet,chewable Chew 1 tablet if needed (acid reflux).      amphetamine-dextroamphetamine (AdderalL) 20 mg tablet Take 1 tablet (20 mg) by mouth 2 times a day. Do not fill before November 15, 2024. 60 tablet 0    buPROPion XL (Wellbutrin XL) 150 mg 24 hr tablet TAKE 1 TABLET BY MOUTH EVERY DAY 90 tablet 0    buPROPion XL (Wellbutrin XL) 300 mg 24 hr tablet TAKE 1 TABLET BY MOUTH EVERY DAY 90 tablet 0    calcium carbonate-vitamin D3 500 mg-5 mcg (200 unit) tablet Take 1 tablet by mouth 2 times a day.      cholecalciferol (Vitamin D-3) 25 MCG (1000 UT) capsule Take 2 capsules (50 mcg) by mouth once daily. 60 capsule 0    fluoride, sodium, 1.1 % gel Apply  to teeth.      levothyroxine (Synthroid, Levoxyl) 50 mcg tablet Take 1 tablet (50 mcg) by mouth once daily. 90 tablet 1    magnesium oxide (Mag-Ox) 400 mg tablet Take 1 tablet (400 mg) by mouth once daily.      moisturizing mouth (Biotene Dry Mouth Oral Rinse) solution Take by mouth.      multivitamin tablet Take 1 tablet by mouth once daily.      omega 3-dha-epa-fish oil (Fish OiL) 1,000 mg (120 mg-180 mg) capsule Take by mouth.      omeprazole (PriLOSEC) 40 mg DR capsule Take 1 capsule (40 mg) by mouth once daily in the morning. Take before meals. Do not crush or chew. Open capsule, sprinkle beads on SF jello, pudding or applesauce. 90 capsule 1    traZODone (Desyrel) 100 mg tablet TAKE 1 TABLET BEDTIME AS DIRECTED (MAY TAKE UP TO 2 TABS AT BEDTIME FOR SLEEP) 180 tablet 0    [DISCONTINUED] sertraline (Zoloft) 100 mg tablet Take 1 tablet (100 mg) by mouth once daily. 90 tablet 3    [DISCONTINUED] sertraline (Zoloft) 50 mg tablet Take 1 tablet (50 mg) by mouth once daily. 90 tablet 3    [DISCONTINUED] amphetamine-dextroamphetamine (Adderall) 10 mg tablet Take 1 tablet (10 mg) by mouth 2 times a day. Do not fill before October 15, 2024. 60 tablet 0     No current facility-administered medications on file prior to visit.       Lab Review:   Lab on 09/16/2024   Component Date Value    Thyroid Stimulating Horm* 09/16/2024 2.38     Methamphetamine Quant, Ur 09/16/2024 <200     MDA, Urine 09/16/2024 <200     MDEA, Urine 09/16/2024 <200     Phentermine,Urine 09/16/2024 <200     Amphetamines,Urine 09/16/2024 4283     MDMA, Urine 09/16/2024 <200    Office Visit on 07/03/2024   Component Date Value    POC HEMOGLOBIN A1c 07/03/2024 5.3          Time Spent:    Prep time: 1 min.  Direct patient time: 30 min.  Documentation time: 6 min.  Total time: 37 min.    Next Appointment:  Follow up in 6 weeks (on 1/22/2025).

## 2024-12-15 RX ORDER — DEXTROAMPHETAMINE SACCHARATE, AMPHETAMINE ASPARTATE, DEXTROAMPHETAMINE SULFATE AND AMPHETAMINE SULFATE 5; 5; 5; 5 MG/1; MG/1; MG/1; MG/1
20 TABLET ORAL 2 TIMES DAILY
Qty: 60 TABLET | Refills: 0 | Status: SHIPPED | OUTPATIENT
Start: 2025-01-14 | End: 2025-02-13

## 2024-12-15 RX ORDER — DEXTROAMPHETAMINE SACCHARATE, AMPHETAMINE ASPARTATE, DEXTROAMPHETAMINE SULFATE AND AMPHETAMINE SULFATE 5; 5; 5; 5 MG/1; MG/1; MG/1; MG/1
20 TABLET ORAL 2 TIMES DAILY
Qty: 60 TABLET | Refills: 0 | Status: SHIPPED | OUTPATIENT
Start: 2025-02-13 | End: 2025-03-15

## 2024-12-15 RX ORDER — DEXTROAMPHETAMINE SACCHARATE, AMPHETAMINE ASPARTATE, DEXTROAMPHETAMINE SULFATE AND AMPHETAMINE SULFATE 5; 5; 5; 5 MG/1; MG/1; MG/1; MG/1
20 TABLET ORAL 2 TIMES DAILY
Qty: 60 TABLET | Refills: 0 | Status: SHIPPED | OUTPATIENT
Start: 2024-12-15 | End: 2025-01-14

## 2024-12-30 DIAGNOSIS — E03.9 ACQUIRED HYPOTHYROIDISM: ICD-10-CM

## 2024-12-30 RX ORDER — LEVOTHYROXINE SODIUM 50 UG/1
50 TABLET ORAL DAILY
Qty: 90 TABLET | Refills: 1 | OUTPATIENT
Start: 2024-12-30

## 2025-01-16 DIAGNOSIS — E03.9 ACQUIRED HYPOTHYROIDISM: ICD-10-CM

## 2025-01-16 RX ORDER — LEVOTHYROXINE SODIUM 50 UG/1
50 TABLET ORAL DAILY
Qty: 90 TABLET | Refills: 0 | Status: SHIPPED | OUTPATIENT
Start: 2025-01-16

## 2025-01-22 ENCOUNTER — APPOINTMENT (OUTPATIENT)
Dept: BEHAVIORAL HEALTH | Facility: CLINIC | Age: 65
End: 2025-01-22
Payer: MEDICARE

## 2025-03-18 DIAGNOSIS — F33.9 DEPRESSION, RECURRENT (CMS-HCC): ICD-10-CM

## 2025-03-18 DIAGNOSIS — F90.2 ATTENTION DEFICIT HYPERACTIVITY DISORDER (ADHD), COMBINED TYPE: ICD-10-CM

## 2025-03-18 RX ORDER — DEXTROAMPHETAMINE SACCHARATE, AMPHETAMINE ASPARTATE, DEXTROAMPHETAMINE SULFATE AND AMPHETAMINE SULFATE 5; 5; 5; 5 MG/1; MG/1; MG/1; MG/1
20 TABLET ORAL 2 TIMES DAILY
Qty: 60 TABLET | Refills: 0 | Status: SHIPPED | OUTPATIENT
Start: 2025-03-18 | End: 2025-04-17

## 2025-03-18 RX ORDER — BUPROPION HYDROCHLORIDE 150 MG/1
150 TABLET ORAL DAILY
Qty: 90 TABLET | Refills: 0 | OUTPATIENT
Start: 2025-03-18

## 2025-03-20 ENCOUNTER — TELEMEDICINE (OUTPATIENT)
Dept: BEHAVIORAL HEALTH | Facility: CLINIC | Age: 65
End: 2025-03-20
Payer: MEDICARE

## 2025-03-20 DIAGNOSIS — F90.2 ATTENTION DEFICIT HYPERACTIVITY DISORDER (ADHD), COMBINED TYPE: ICD-10-CM

## 2025-03-20 DIAGNOSIS — F33.9 DEPRESSION, RECURRENT (CMS-HCC): ICD-10-CM

## 2025-03-20 DIAGNOSIS — F42.8 OBSESSIVE THINKING: ICD-10-CM

## 2025-03-20 DIAGNOSIS — F33.1 MODERATE EPISODE OF RECURRENT MAJOR DEPRESSIVE DISORDER: ICD-10-CM

## 2025-03-20 DIAGNOSIS — F41.1 GENERALIZED ANXIETY DISORDER: Primary | ICD-10-CM

## 2025-03-20 DIAGNOSIS — F51.04 PSYCHOPHYSIOLOGICAL INSOMNIA: ICD-10-CM

## 2025-03-20 RX ORDER — BUPROPION HYDROCHLORIDE 150 MG/1
150 TABLET ORAL DAILY
Qty: 90 TABLET | Refills: 3 | Status: SHIPPED | OUTPATIENT
Start: 2025-03-20 | End: 2026-03-20

## 2025-03-20 RX ORDER — BUPROPION HYDROCHLORIDE 300 MG/1
300 TABLET ORAL DAILY
Qty: 90 TABLET | Refills: 3 | Status: SHIPPED | OUTPATIENT
Start: 2025-03-20 | End: 2026-03-20

## 2025-03-20 ASSESSMENT — ENCOUNTER SYMPTOMS
DYSPHORIC MOOD: 1
DECREASED CONCENTRATION: 1
NERVOUS/ANXIOUS: 1

## 2025-03-20 NOTE — PROGRESS NOTES
"Adult Ambulatory Psychiatry Progress Note      Assessment/Plan     Impression:  Mandy Ramirez is a 64 y.o. 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 I can't stand how they are trying to taking things over.\"    Plan: Patient was cooperative yet very anxious, reserved and admits anxiety is tied to the political changes that are upsetting her and her daughter's health scare. Also still struggling with attention issues but ongoing memory concerns, but continues to work at using skills to address ADHD Paralysis. Reviewed and agreed to leaving medications and treatment plan in place. Will sign CSA at next appointment in person.    Medication: Zoloft 150mg every day, Adderall 20mg twice daily, Wellbutrin XL 450mg every day. Trazodone 200mg at bedtime.    Reviewed r/b/a, possible side effects of the medication. Client is aware about the benefit outweighs the risk.     Diagnoses and all orders for this visit:  Generalized anxiety disorder  Depression, recurrent ([facility])  -     buPROPion XL (Wellbutrin XL) 150 mg 24 hr tablet; Take 1 tablet (150 mg) by mouth once daily.  -     buPROPion XL (Wellbutrin XL) 300 mg 24 hr tablet; Take 1 tablet (300 mg) by mouth once daily.  Attention deficit hyperactivity disorder (ADHD), combined type  -     amphetamine-dextroamphetamine (Adderall) 20 mg tablet; Take 1 tablet (20 mg) by mouth 2 times a day. Do not fill before April 17, 2025.  -     amphetamine-dextroamphetamine (Adderall) 20 mg tablet; Take 1 tablet (20 mg) by mouth 2 times a day. Do not fill before May 17, 2025.  -     amphetamine-dextroamphetamine (Adderall) 20 mg tablet; Take 1 tablet (20 mg) by mouth 2 times a day. Do not fill before June 16, 2025.  Obsessive thinking  Moderate episode of recurrent major depressive disorder  Psychophysiological insomnia            Therapy: " n/a    Other: n/a    Patient is reminded that if there is SI to call [phone] and get themselves to the closest ED for evaluation, otherwise contact me for other questions/concerns.     Subjective   HPI:     Virtual Consent    An interactive audio and video telecommunication system which permits real time communications between the patient (at work) and provider (at home office) was utilized to provide this telehealth service.   Verbal consent was requested and obtained from Mandy Ramirez on this date, 25 for a telehealth visit.     Present Illness - depression, anxiety, ADHD     Characteristics/Recent psychiatric symptoms (pertinent positives and negatives) - reports her anxiety is triggered by the destruction of the [place] government by the new administration and how people have fallen under 47's spell. Worries that the country is going down the Handmaid's Tale path. Reports so upset with how cruel, and evil and bigoted with how the entire administration and anyone associated with the new people getting in control of the government has been. Overall anxiety however is well regulated. Does admit some grief with her cat having recently dying, her daughter was in the hospital and was seriously ill - dealing with cirrhosis of the liver, and bleeding in her esophagus and she was dealing with end stage kidney, and she almost  but after being treated she was stabilized. It did leave her stressed and sad but did not leave her feeling so distraught that she was destroyed. Admits to making sure to keep a smile on her face. Reports still making effort to control her ADD symptoms and struggles with managing her ADHD paralysis and that is an ongoing problem. Gets work done but at the same time has trouble with motivation and doesn't get a lot of the tasks done. Feels like not putting as much pressure on herself with accomplishing things and that feels better. Reports racing, obsessive, ruminating thoughts are 'there.'  Reports appetite is 'stable.' Reports noticing energy levels are 'low and feeling mental and physical fatigue.'    Onset/timeframe - 1 month  Type - depression, concentration, anxiety  Duration - daily  Aggravating and/or relieving factors/triggers - politics and daughter's health, and having too much on her plate - work/life balance and still struggling with focus/concentration issues   Treatment and treatment changes (new meds, dosage increases or decreases, med compliance, therapy frequency, etc.) (Past and Recent) - Zoloft 150mg QD, Wellbtutrin XL 450mg QD, Trazodone 200mg @[facility]. Adderall IR 10mg BID.      Issues: Denies SI/HI/AVH, currently.       Review of Systems   Endocrine:        Thyroid issues, hair thinning   Neurological:         Memory   Psychiatric/Behavioral:  Positive for decreased concentration and dysphoric mood. The patient is nervous/anxious.        OARRS:  YUDITH Khan-CNP on 3/20/2025  8:45 AM  I have personally reviewed the OARRS report for Mandy Ramirez. I have considered the risks of abuse, dependence, addiction and diversion    Is the patient prescribed a combination of a benzodiazepine and opioid?  No    Last Urine Drug Screen / ordered today: No  Recent Results (from the past 8760 hours)   Amphetamine Confirm, Urine    Collection Time: 09/16/24  4:39 PM   Result Value Ref Range    Methamphetamine Quant, Ur <200 ng/mL    MDA, Urine <200 ng/mL    MDEA, Urine <200 ng/mL    Phentermine,Urine <200 ng/mL    Amphetamines,Urine 4283 ng/mL    MDMA, Urine <200 ng/mL     Results are as expected.         Controlled Substance Agreement:  Date of the Last Agreement: 02/03/2024  Reviewed Controlled Substance Agreement including but not limited to the benefits, risks, and alternatives to treatment with a Controlled Substance medication(s).    Stimulants:   What is the patient's goal of therapy? Stable attention  Is this being achieved with current treatment? no    Activities of Daily Living:    Is your overall impression that this patient is benefiting (symptom reduction outweighs side effects) from stimulant therapy? No     1. Physical Functioning: Same  2. Family Relationship: Same  3. Social Relationship: Same  4. Mood: Same  5. Sleep Patterns: Same  6. Overall Function: Same      Objective   Mental Status Exam:  General Appearance: Well groomed, appropriate eye contact  Attitude/Behavior: Cooperative, Distracted  Motor: Fidgeting  Speech: Rapid Speech, pressured  Gait/Station: Other:(comment) (sitting in front of work computer, at work, over virtual connection)  Mood: 'stressed'  Affect: Dysphoric, constricted, Anxious, Irritable, Congruent with mood and topic of conversation  Thought Process: Linear, goal directed, Perseverative, Flight of ideas  Thought Associations: No loosening of associations  Thought Content: Normal, Other: (comment) (politics is upsetting to her, and while daughter is stable - her daughter's health scare really upset her recently)  Perception: No perceptual abnormalities noted  Sensorium: Alert and oriented to person, place, time and situation  Insight: Fair  Judgement: Intact  Cognition: Cognitively intact to conversational testing with respect to attention, orientation, fund of knowledge, recent and remote memory, and language  Testing: N/A    SATISH-7/PHQ-9 scores reviewed: 5, 13 compared to 9, 11 reflecting further improvement in anxiety but a small increase in depression.     Current Medications:  Current Outpatient Medications on File Prior to Visit   Medication Sig Dispense Refill    aluminum hydrox-magnesium carb (Gaviscon Extra Strength) 160-105 mg tablet,chewable Chew 1 tablet if needed (acid reflux).      amphetamine-dextroamphetamine (Adderall) 20 mg tablet Take 1 tablet (20 mg) by mouth 2 times a day. 60 tablet 0    calcium carbonate-vitamin D3 500 mg-5 mcg (200 unit) tablet Take 1 tablet by mouth 2 times a day.      cholecalciferol (Vitamin D-3) 25 MCG (1000 UT)  capsule Take 2 capsules (50 mcg) by mouth once daily. 60 capsule 0    fluoride, sodium, 1.1 % gel Apply to teeth.      levothyroxine (Synthroid, Levoxyl) 50 mcg tablet Take 1 tablet (50 mcg) by mouth once daily. 90 tablet 0    magnesium oxide (Mag-Ox) 400 mg tablet Take 1 tablet (400 mg) by mouth once daily.      moisturizing mouth (Biotene Dry Mouth Oral Rinse) solution Take by mouth.      multivitamin tablet Take 1 tablet by mouth once daily.      omega 3-dha-epa-fish oil (Fish OiL) 1,000 mg (120 mg-180 mg) capsule Take by mouth.      sertraline (Zoloft) 100 mg tablet Take 1.5 tablets (150 mg) by mouth once daily. 135 tablet 3    traZODone (Desyrel) 100 mg tablet TAKE 1 TABLET BEDTIME AS DIRECTED (MAY TAKE UP TO 2 TABS AT BEDTIME FOR SLEEP) 180 tablet 0    [DISCONTINUED] buPROPion XL (Wellbutrin XL) 150 mg 24 hr tablet TAKE 1 TABLET BY MOUTH EVERY DAY 90 tablet 0    [DISCONTINUED] buPROPion XL (Wellbutrin XL) 300 mg 24 hr tablet TAKE 1 TABLET BY MOUTH EVERY DAY 90 tablet 0    omeprazole (PriLOSEC) 40 mg DR capsule Take 1 capsule (40 mg) by mouth once daily in the morning. Take before meals. Do not crush or chew. Open capsule, sprinkle beads on SF jello, pudding or applesauce. 90 capsule 1    [DISCONTINUED] amphetamine-dextroamphetamine (AdderalL) 20 mg tablet Take 1 tablet (20 mg) by mouth 2 times a day. Do not fill before November 15, 2024. 60 tablet 0    [DISCONTINUED] amphetamine-dextroamphetamine (Adderall) 20 mg tablet Take 1 tablet (20 mg) by mouth 2 times a day. 60 tablet 0    [DISCONTINUED] amphetamine-dextroamphetamine (Adderall) 20 mg tablet Take 1 tablet (20 mg) by mouth 2 times a day. Do not fill before January 14, 2025. 60 tablet 0    [DISCONTINUED] amphetamine-dextroamphetamine (Adderall) 20 mg tablet Take 1 tablet (20 mg) by mouth 2 times a day. Do not fill before February 13, 2025. 60 tablet 0     No current facility-administered medications on file prior to visit.       Lab Review:   No visits  with results within 6 Month(s) from this visit.   Latest known visit with results is:   Lab on 09/16/2024   Component Date Value    Thyroid Stimulating Horm* 09/16/2024 2.38     Methamphetamine Quant, Ur 09/16/2024 <200     MDA, Urine 09/16/2024 <200     MDEA, Urine 09/16/2024 <200     Phentermine,Urine 09/16/2024 <200     Amphetamines,Urine 09/16/2024 4283     MDMA, Urine 09/16/2024 <200          Time Spent:    Prep time: 1 min.  Direct patient time: 31 min.  Documentation time: 6 min.  Total time: 38 min.    Next Appointment:  Follow up in 3 months (on 6/20/2025).

## 2025-03-21 RX ORDER — DEXTROAMPHETAMINE SACCHARATE, AMPHETAMINE ASPARTATE, DEXTROAMPHETAMINE SULFATE AND AMPHETAMINE SULFATE 5; 5; 5; 5 MG/1; MG/1; MG/1; MG/1
20 TABLET ORAL 2 TIMES DAILY
Qty: 60 TABLET | Refills: 0 | Status: SHIPPED | OUTPATIENT
Start: 2025-04-17 | End: 2025-05-17

## 2025-03-21 RX ORDER — DEXTROAMPHETAMINE SACCHARATE, AMPHETAMINE ASPARTATE, DEXTROAMPHETAMINE SULFATE AND AMPHETAMINE SULFATE 5; 5; 5; 5 MG/1; MG/1; MG/1; MG/1
20 TABLET ORAL 2 TIMES DAILY
Qty: 60 TABLET | Refills: 0 | Status: SHIPPED | OUTPATIENT
Start: 2025-05-17 | End: 2025-06-16

## 2025-03-21 RX ORDER — DEXTROAMPHETAMINE SACCHARATE, AMPHETAMINE ASPARTATE, DEXTROAMPHETAMINE SULFATE AND AMPHETAMINE SULFATE 5; 5; 5; 5 MG/1; MG/1; MG/1; MG/1
20 TABLET ORAL 2 TIMES DAILY
Qty: 60 TABLET | Refills: 0 | Status: SHIPPED | OUTPATIENT
Start: 2025-06-16 | End: 2025-07-16

## 2025-05-15 DIAGNOSIS — E03.9 ACQUIRED HYPOTHYROIDISM: ICD-10-CM

## 2025-05-19 RX ORDER — LEVOTHYROXINE SODIUM 50 UG/1
50 TABLET ORAL DAILY
Qty: 90 TABLET | Refills: 0 | Status: SHIPPED | OUTPATIENT
Start: 2025-05-19

## 2025-06-20 ENCOUNTER — APPOINTMENT (OUTPATIENT)
Dept: BEHAVIORAL HEALTH | Facility: CLINIC | Age: 65
End: 2025-06-20
Payer: MEDICARE

## 2025-06-20 VITALS
SYSTOLIC BLOOD PRESSURE: 104 MMHG | HEIGHT: 68 IN | BODY MASS INDEX: 32.64 KG/M2 | TEMPERATURE: 97.6 F | RESPIRATION RATE: 16 BRPM | DIASTOLIC BLOOD PRESSURE: 72 MMHG | WEIGHT: 215.4 LBS | HEART RATE: 76 BPM

## 2025-06-20 DIAGNOSIS — F42.8 OBSESSIVE THINKING: ICD-10-CM

## 2025-06-20 DIAGNOSIS — R41.3 MEMORY DEFICITS: ICD-10-CM

## 2025-06-20 DIAGNOSIS — F42.2 MIXED OBSESSIONAL THOUGHTS AND ACTS: ICD-10-CM

## 2025-06-20 DIAGNOSIS — F51.04 PSYCHOPHYSIOLOGICAL INSOMNIA: ICD-10-CM

## 2025-06-20 DIAGNOSIS — F41.1 GENERALIZED ANXIETY DISORDER: ICD-10-CM

## 2025-06-20 DIAGNOSIS — F90.2 ATTENTION DEFICIT HYPERACTIVITY DISORDER (ADHD), COMBINED TYPE: Primary | ICD-10-CM

## 2025-06-20 DIAGNOSIS — F33.1 MODERATE EPISODE OF RECURRENT MAJOR DEPRESSIVE DISORDER: ICD-10-CM

## 2025-06-20 PROCEDURE — 1036F TOBACCO NON-USER: CPT | Performed by: NURSE PRACTITIONER

## 2025-06-20 PROCEDURE — 3008F BODY MASS INDEX DOCD: CPT | Performed by: NURSE PRACTITIONER

## 2025-06-20 PROCEDURE — 99214 OFFICE O/P EST MOD 30 MIN: CPT | Performed by: NURSE PRACTITIONER

## 2025-06-20 PROCEDURE — 1159F MED LIST DOCD IN RCRD: CPT | Performed by: NURSE PRACTITIONER

## 2025-06-20 PROCEDURE — 1160F RVW MEDS BY RX/DR IN RCRD: CPT | Performed by: NURSE PRACTITIONER

## 2025-06-20 RX ORDER — DEXTROAMPHETAMINE SACCHARATE, AMPHETAMINE ASPARTATE, DEXTROAMPHETAMINE SULFATE AND AMPHETAMINE SULFATE 7.5; 7.5; 7.5; 7.5 MG/1; MG/1; MG/1; MG/1
30 TABLET ORAL
Qty: 60 TABLET | Refills: 0 | Status: SHIPPED | OUTPATIENT
Start: 2025-08-16 | End: 2025-09-15

## 2025-06-20 RX ORDER — DEXTROAMPHETAMINE SACCHARATE, AMPHETAMINE ASPARTATE, DEXTROAMPHETAMINE SULFATE AND AMPHETAMINE SULFATE 7.5; 7.5; 7.5; 7.5 MG/1; MG/1; MG/1; MG/1
30 TABLET ORAL
Qty: 60 TABLET | Refills: 0 | Status: SHIPPED | OUTPATIENT
Start: 2025-07-17 | End: 2025-08-16

## 2025-06-20 RX ORDER — DEXTROAMPHETAMINE SACCHARATE, AMPHETAMINE ASPARTATE, DEXTROAMPHETAMINE SULFATE AND AMPHETAMINE SULFATE 7.5; 7.5; 7.5; 7.5 MG/1; MG/1; MG/1; MG/1
30 TABLET ORAL
Qty: 60 TABLET | Refills: 0 | Status: SHIPPED | OUTPATIENT
Start: 2025-09-15 | End: 2025-10-15

## 2025-06-20 ASSESSMENT — ENCOUNTER SYMPTOMS
DECREASED CONCENTRATION: 1
AGITATION: 1
NERVOUS/ANXIOUS: 1
DYSPHORIC MOOD: 1

## 2025-06-20 ASSESSMENT — PATIENT HEALTH QUESTIONNAIRE - PHQ9
2. FEELING DOWN, DEPRESSED OR HOPELESS: SEVERAL DAYS
10. IF YOU CHECKED OFF ANY PROBLEMS, HOW DIFFICULT HAVE THESE PROBLEMS MADE IT FOR YOU TO DO YOUR WORK, TAKE CARE OF THINGS AT HOME, OR GET ALONG WITH OTHER PEOPLE: SOMEWHAT DIFFICULT
3. TROUBLE FALLING OR STAYING ASLEEP OR SLEEPING TOO MUCH: MORE THAN HALF THE DAYS
6. FEELING BAD ABOUT YOURSELF - OR THAT YOU ARE A FAILURE OR HAVE LET YOURSELF OR YOUR FAMILY DOWN: MORE THAN HALF THE DAYS
8. MOVING OR SPEAKING SO SLOWLY THAT OTHER PEOPLE COULD HAVE NOTICED. OR THE OPPOSITE, BEING SO FIGETY OR RESTLESS THAT YOU HAVE BEEN MOVING AROUND A LOT MORE THAN USUAL: MORE THAN HALF THE DAYS
7. TROUBLE CONCENTRATING ON THINGS, SUCH AS READING THE NEWSPAPER OR WATCHING TELEVISION: NEARLY EVERY DAY
9. THOUGHTS THAT YOU WOULD BE BETTER OFF DEAD, OR OF HURTING YOURSELF: NOT AT ALL
1. LITTLE INTEREST OR PLEASURE IN DOING THINGS: NOT AT ALL
4. FEELING TIRED OR HAVING LITTLE ENERGY: MORE THAN HALF THE DAYS
5. POOR APPETITE OR OVEREATING: SEVERAL DAYS

## 2025-06-20 ASSESSMENT — COLUMBIA-SUICIDE SEVERITY RATING SCALE - C-SSRS
1. HAVE YOU WISHED YOU WERE DEAD OR WISHED YOU COULD GO TO SLEEP AND NOT WAKE UP?: NO
2. HAVE YOU ACTUALLY HAD ANY THOUGHTS OF KILLING YOURSELF?: NO
TOTAL  NUMBER OF INTERRUPTED ATTEMPTS LIFETIME: NO
6. HAVE YOU EVER DONE ANYTHING, STARTED TO DO ANYTHING, OR PREPARED TO DO ANYTHING TO END YOUR LIFE?: NO
TOTAL  NUMBER OF ABORTED OR SELF INTERRUPTED ATTEMPTS LIFETIME: NO
ATTEMPT LIFETIME: NO

## 2025-06-20 ASSESSMENT — ANXIETY QUESTIONNAIRES
IF YOU CHECKED OFF ANY PROBLEMS ON THIS QUESTIONNAIRE, HOW DIFFICULT HAVE THESE PROBLEMS MADE IT FOR YOU TO DO YOUR WORK, TAKE CARE OF THINGS AT HOME, OR GET ALONG WITH OTHER PEOPLE: SOMEWHAT DIFFICULT
4. TROUBLE RELAXING: NOT AT ALL
5. BEING SO RESTLESS THAT IT IS HARD TO SIT STILL: SEVERAL DAYS
6. BECOMING EASILY ANNOYED OR IRRITABLE: NOT AT ALL
3. WORRYING TOO MUCH ABOUT DIFFERENT THINGS: MORE THAN HALF THE DAYS
GAD7 TOTAL SCORE: 6
2. NOT BEING ABLE TO STOP OR CONTROL WORRYING: SEVERAL DAYS
7. FEELING AFRAID AS IF SOMETHING AWFUL MIGHT HAPPEN: SEVERAL DAYS
1. FEELING NERVOUS, ANXIOUS, OR ON EDGE: SEVERAL DAYS

## 2025-06-20 NOTE — PROGRESS NOTES
"Adult Ambulatory Psychiatry Progress Note      Assessment/Plan     Impression:  Mandy Ramirez is a 64 y.o. 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 I am feeling good and able to laugh at stupid things I do and able to maintain. But my head is still spinning and I feel like I am entering Alzheimer's territory or I need something more than Adderall. I can't remember even how I got here, because I have so much on my mind - so many thoughts are running through my head and it is like before but worse.\"    Plan: Patient was cooperative yet very anxious, reserved and scattered thoughts and perseverative speech. At times demonstrated OCD behaviors (re-arranging stapler and tape on desk of provider while in office). Reports feeling stable on Zoloft, but reveals worrying about her daughter's health, political issues ongoing, yet the biggest issue/stressor is due to having multiple jobs at work, and literally no one to help her, thoughts/ADHD/ADHD paralysis is out of control and is easily distracted, cannot remember things and feels like she has early Alzheimer's (had neurology tests done). Discussed increasing Adderall, but patient admitted she also needs to hire another full time employee to offload her work. Reviewed and agreed to increasing Adderall but leave other medications and treatment plan in place. Signed CSA for previous dose of Adderall and now increased, new dose of Adderall. Will finish out current dose of Adderall and  new dose in July.    Medication: Zoloft 150mg every day, increases Adderall from 20mg to 30mg twice daily, Wellbutrin XL 450mg every day. Trazodone 200mg at bedtime.    Reviewed r/b/a, possible side effects of the medication. Client is aware about the benefit outweighs the risk.     Diagnoses and all orders for this visit:  Attention deficit hyperactivity disorder " (ADHD), combined type  -     amphetamine-dextroamphetamine (Adderall) 30 mg tablet; Take 1 tablet (30 mg) by mouth 2 times daily (morning and midday). Do not fill before July 17, 2025.  -     amphetamine-dextroamphetamine (Adderall) 30 mg tablet; Take 1 tablet (30 mg) by mouth 2 times daily (morning and midday). Do not fill before August 16, 2025.  -     amphetamine-dextroamphetamine (Adderall) 30 mg tablet; Take 1 tablet (30 mg) by mouth 2 times daily (morning and midday). Do not fill before September 15, 2025.  Generalized anxiety disorder  Moderate episode of recurrent major depressive disorder  Obsessive thinking  Psychophysiological insomnia  Mixed obsessional thoughts and acts  Memory deficits          Therapy: n/a    Other: n/a    Patient is reminded that if there is SI to call [phone] and get themselves to the closest ED for evaluation, otherwise contact me for other questions/concerns.     Subjective   HPI:    Present Illness - depression, anxiety, ADHD     Characteristics/Recent psychiatric symptoms (pertinent positives and negatives) - reports her  is well d/t being retired but his medical issues are known (WBC is on the rise) yet he is happy and maintaining yet needs to re-apply for disability through the [facility] but hasn't done it yet, especially with the new government in charge and it's dismantling of all services, puts his ability to apply for care in question. Her daughter's health is questionable (needs liver transplant and on a wait list and as she quit drinking supposedly, she is next recipient), along with living with her bf (and her own kids) while still , and her own home is being seized by the bank for the past 2 years (foreclosure - being pushed out) and she has confronted her daughter's alcoholism (finally confronted her about finding 15 jugs of dark liquor in her home after being told she can't go into her home, as she needed to get in the home to see what needs to be done to sell the  "home - which also has structural issues, d/t water leak), but finally everything is on the mend. Reports glad she got to babysit her grand kids yesterday. Feels good about the positive things in her life, as her daughter is doing well with having a job in place. Admits her own job though is a struggle, d/t her own ADHD issues and can't stay on track. ADHD paralysis is a huge problem and admits this but still struggles with trying to figure out what to do next even with how to proceed next. Unable to focus on any one task, finding herself falling behind but is still making effort to prioritize/organizing things (finally got to the 2023 taxes for the business done, and now is getting to the 2024 taxes), and is doing all of the [employer] work. Computers, IT troubleshooting, and website development, accounting/taxes/payroll, and HR/hiring personnel are all under her purview for the company ([employer]). She has an assistant, Araceli, but she doesn't feel comfortable doing much of the work (only finally has done some of the payroll), including the physical work and then just defers to the patient to do most of the work. Admits they need to hire 1 if not 2 others with qualifications but they also don't have enough money to pay people to do the work properly, if not split the work. Reports often feeling like retiring. 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 Reveals had hired people for some positions whom worked briefly and then they didn't work out well in the end, ending up feeling frustrated. Anxiety is 'good' with the 150mg of the Sertraline, even with the stress at work and the political issues unfolding across the country. \"I make sure I have a smile on my face and be happy with everything going on.\" Reports her depression 'is not bad. If all of this at work wasn't happening, I would be fine. In fact with these thoughts of retiring in " my head, that tells me that I am thinking of wanting to get out of this, but I have thoughts of needing to be done, but I have need to get things done with work. Paperwork needs to be filed away.'  Reports staying up late, after working long hours at work (often 9pm) and up late until midnight/1am, and gets up at 8-9am and goes into work late, and work weeks are up to 50 hours a week. Sleep ranges from 6-8 hours but is low energy and mentally taxed. Physically is not fatigued. Appetite is fine. Admits to constant racing, obsessive, ruminating thoughts 'all the time' that are so distracting that she forgets things and when driving forgets often how she got from point A to B.       Onset/timeframe - 1 month  Type - depression, concentration, anxiety  Duration - daily  Aggravating and/or relieving factors/triggers - work is overwhelming her - taking on multiple roles at once and no help, causing her ADHD/ADHD paralysis to be worse, and feels like she can't remember things well. Depression and some anxiety are still there d/t worrying about her daughter's health and situation but feels Zoloft is helping her manage her mood well.   Treatment and treatment changes (new meds, dosage increases or decreases, med compliance, therapy frequency, etc.) (Past and Recent) - Zoloft 150mg QD, Wellbtutrin XL 450mg QD, Trazodone 200mg @HS. Adderall IR 20mg BID.      Issues: Denies SI/HI/AVH, currently.     Goes to the [employer] on the weekends to relax, her one thing that she does with her  and grand kids - a form of self-care.      Review of Systems   Endocrine:        Thyroid issues, hair thinning   Neurological:         Memory   Psychiatric/Behavioral:  Positive for agitation, behavioral problems, decreased concentration and dysphoric mood. The patient is nervous/anxious.        OARRS:  Olivier Stokes, APRN-CNP on 6/20/2025 12:51 PM  I have personally reviewed the OARRS report for Mandy Ramirez. I have considered the  risks of abuse, dependence, addiction and diversion    Is the patient prescribed a combination of a benzodiazepine and opioid?  No    Last Urine Drug Screen / ordered today: No  Recent Results (from the past 8760 hours)   Amphetamine Confirm, Urine    Collection Time: 09/16/24  4:39 PM   Result Value Ref Range    Methamphetamine Quant, Ur <200 ng/mL    MDA, Urine <200 ng/mL    MDEA, Urine <200 ng/mL    Phentermine,Urine <200 ng/mL    Amphetamines,Urine 4283 ng/mL    MDMA, Urine <200 ng/mL     Results are as expected.         Controlled Substance Agreement:  Date of the Last Agreement: 02/03/2024  Reviewed Controlled Substance Agreement including but not limited to the benefits, risks, and alternatives to treatment with a Controlled Substance medication(s).    Stimulants:   What is the patient's goal of therapy? Stable attention  Is this being achieved with current treatment? no    Activities of Daily Living:   Is your overall impression that this patient is benefiting (symptom reduction outweighs side effects) from stimulant therapy? No     1. Physical Functioning: Same  2. Family Relationship: Same  3. Social Relationship: Same  4. Mood: Same  5. Sleep Patterns: Same  6. Overall Function: Same      Objective   Mental Status Exam:  General Appearance: Fairly groomed  Attitude/Behavior: Superficially cooperative, Ingratiating, Distracted, Fair eye contact  Motor: Fidgeting  Speech: Rapid Speech, pressured, Other: (comment) (perseverative, requiring frequent interruption and redirection)  Gait/Station: WFL - Within functional limits  Mood: 'good, fine'  Affect: Dysphoric, constricted but reactive, Anxious, Increased intensity, Incongruent with mood  Thought Process: Perseverative, Flight of ideas, Thought blocking (obsessive, ruminating)  Thought Associations: No loosening of associations  Thought Content: Normal, Other: (comment) (work, being overwhelmed at work (wearing multiple hats) and unable to let go of the  work, ADHD paralysis, racing thoughts, dealing with daughter's different issues, but feels fine overall.)  Perception: No perceptual abnormalities noted  Sensorium: Alert and oriented to person, place, time and situation  Insight: Limited  Judgement: Limited  Cognition: Attention  Testing: N/A    SATISH-7/PHQ-9 scores reviewed: 6, 13 compared to 5, 13 reflecting mild increase in anxiety but no change in moderate depression.     Current Medications:  Current Outpatient Medications on File Prior to Visit   Medication Sig Dispense Refill    aluminum hydrox-magnesium carb (Gaviscon Extra Strength) 160-105 mg tablet,chewable Chew 1 tablet if needed (acid reflux).      amphetamine-dextroamphetamine (Adderall) 20 mg tablet Take 1 tablet (20 mg) by mouth 2 times a day. Do not fill before June 16, 2025. 60 tablet 0    buPROPion XL (Wellbutrin XL) 150 mg 24 hr tablet Take 1 tablet (150 mg) by mouth once daily. 90 tablet 3    buPROPion XL (Wellbutrin XL) 300 mg 24 hr tablet Take 1 tablet (300 mg) by mouth once daily. 90 tablet 3    calcium carbonate-vitamin D3 500 mg-5 mcg (200 unit) tablet Take 1 tablet by mouth 2 times a day.      cholecalciferol (Vitamin D-3) 25 MCG (1000 UT) capsule Take 2 capsules (50 mcg) by mouth once daily. 60 capsule 0    fluoride, sodium, 1.1 % gel Apply to teeth.      levothyroxine (Synthroid, Levoxyl) 50 mcg tablet TAKE 1 TABLET BY MOUTH ONCE DAILY. 90 tablet 0    magnesium oxide (Mag-Ox) 400 mg tablet Take 1 tablet (400 mg) by mouth once daily.      moisturizing mouth (Biotene Dry Mouth Oral Rinse) solution Take by mouth.      multivitamin tablet Take 1 tablet by mouth once daily.      omega 3-dha-epa-fish oil (Fish OiL) 1,000 mg (120 mg-180 mg) capsule Take by mouth.      sertraline (Zoloft) 100 mg tablet Take 1.5 tablets (150 mg) by mouth once daily. 135 tablet 3    traZODone (Desyrel) 100 mg tablet TAKE 1 TABLET BEDTIME AS DIRECTED (MAY TAKE UP TO 2 TABS AT BEDTIME FOR SLEEP) 180 tablet 0     omeprazole (PriLOSEC) 40 mg DR capsule Take 1 capsule (40 mg) by mouth once daily in the morning. Take before meals. Do not crush or chew. Open capsule, sprinkle beads on SF jello, pudding or applesauce. 90 capsule 1    [DISCONTINUED] amphetamine-dextroamphetamine (Adderall) 20 mg tablet Take 1 tablet (20 mg) by mouth 2 times a day. 60 tablet 0    [DISCONTINUED] amphetamine-dextroamphetamine (Adderall) 20 mg tablet Take 1 tablet (20 mg) by mouth 2 times a day. Do not fill before April 17, 2025. 60 tablet 0    [DISCONTINUED] amphetamine-dextroamphetamine (Adderall) 20 mg tablet Take 1 tablet (20 mg) by mouth 2 times a day. Do not fill before May 17, 2025. 60 tablet 0     No current facility-administered medications on file prior to visit.       Lab Review:   No visits with results within 6 Month(s) from this visit.   Latest known visit with results is:   Lab on 09/16/2024   Component Date Value    Thyroid Stimulating Horm* 09/16/2024 2.38     Methamphetamine Quant, Ur 09/16/2024 <200     MDA, Urine 09/16/2024 <200     MDEA, Urine 09/16/2024 <200     Phentermine,Urine 09/16/2024 <200     Amphetamines,Urine 09/16/2024 4283     MDMA, Urine 09/16/2024 <200          Time Spent:    Prep time: 1 min.  Direct patient time: 32 min.  Documentation time: 6 min.  Total time: 39 min.    Next Appointment:  Follow up in 9 weeks (on 8/22/2025).

## 2025-08-21 ENCOUNTER — APPOINTMENT (OUTPATIENT)
Dept: BEHAVIORAL HEALTH | Facility: CLINIC | Age: 65
End: 2025-08-21
Payer: MEDICARE

## 2025-08-21 DIAGNOSIS — F51.04 PSYCHOPHYSIOLOGICAL INSOMNIA: ICD-10-CM

## 2025-08-21 DIAGNOSIS — F41.1 GENERALIZED ANXIETY DISORDER: Primary | ICD-10-CM

## 2025-08-21 DIAGNOSIS — F33.1 MODERATE EPISODE OF RECURRENT MAJOR DEPRESSIVE DISORDER: ICD-10-CM

## 2025-08-21 DIAGNOSIS — F42.2 MIXED OBSESSIONAL THOUGHTS AND ACTS: ICD-10-CM

## 2025-08-21 DIAGNOSIS — F90.2 ATTENTION DEFICIT HYPERACTIVITY DISORDER (ADHD), COMBINED TYPE: ICD-10-CM

## 2025-08-21 PROCEDURE — 99214 OFFICE O/P EST MOD 30 MIN: CPT | Performed by: NURSE PRACTITIONER

## 2025-08-21 PROCEDURE — 1159F MED LIST DOCD IN RCRD: CPT | Performed by: NURSE PRACTITIONER

## 2025-08-21 PROCEDURE — 1160F RVW MEDS BY RX/DR IN RCRD: CPT | Performed by: NURSE PRACTITIONER

## 2025-08-21 ASSESSMENT — ENCOUNTER SYMPTOMS
AGITATION: 1
NERVOUS/ANXIOUS: 1
DECREASED CONCENTRATION: 1
DYSPHORIC MOOD: 1

## 2025-08-22 DIAGNOSIS — E03.9 ACQUIRED HYPOTHYROIDISM: ICD-10-CM

## 2025-08-24 RX ORDER — DEXTROAMPHETAMINE SACCHARATE, AMPHETAMINE ASPARTATE, DEXTROAMPHETAMINE SULFATE AND AMPHETAMINE SULFATE 7.5; 7.5; 7.5; 7.5 MG/1; MG/1; MG/1; MG/1
30 TABLET ORAL
Qty: 60 TABLET | Refills: 0 | Status: SHIPPED | OUTPATIENT
Start: 2025-11-14 | End: 2025-12-14

## 2025-08-24 RX ORDER — DEXTROAMPHETAMINE SACCHARATE, AMPHETAMINE ASPARTATE, DEXTROAMPHETAMINE SULFATE AND AMPHETAMINE SULFATE 7.5; 7.5; 7.5; 7.5 MG/1; MG/1; MG/1; MG/1
30 TABLET ORAL
Qty: 60 TABLET | Refills: 0 | Status: SHIPPED | OUTPATIENT
Start: 2025-10-15 | End: 2025-11-14

## 2025-08-24 RX ORDER — DEXTROAMPHETAMINE SACCHARATE, AMPHETAMINE ASPARTATE, DEXTROAMPHETAMINE SULFATE AND AMPHETAMINE SULFATE 7.5; 7.5; 7.5; 7.5 MG/1; MG/1; MG/1; MG/1
30 TABLET ORAL
Qty: 60 TABLET | Refills: 0 | Status: SHIPPED | OUTPATIENT
Start: 2025-12-14 | End: 2026-01-13

## 2025-08-25 RX ORDER — LEVOTHYROXINE SODIUM 50 UG/1
50 TABLET ORAL DAILY
Qty: 90 TABLET | Refills: 0 | OUTPATIENT
Start: 2025-08-25

## 2025-08-28 RX ORDER — LEVOTHYROXINE SODIUM 50 UG/1
50 TABLET ORAL DAILY
Qty: 30 TABLET | Refills: 0 | Status: SHIPPED | OUTPATIENT
Start: 2025-08-28

## 2025-08-29 ENCOUNTER — APPOINTMENT (OUTPATIENT)
Dept: BEHAVIORAL HEALTH | Facility: CLINIC | Age: 65
End: 2025-08-29
Payer: MEDICARE

## 2025-11-13 ENCOUNTER — APPOINTMENT (OUTPATIENT)
Dept: BEHAVIORAL HEALTH | Facility: CLINIC | Age: 65
End: 2025-11-13
Payer: MEDICARE

## (undated) DEVICE — Device

## (undated) DEVICE — DRAIN, CHANNEL, ROUND, FULL FLUTE 19F

## (undated) DEVICE — SLEEVE, KII, Z-THREAD, 5X100CM

## (undated) DEVICE — NEEDLE, CLOSURE, OMNICLOSE

## (undated) DEVICE — DRAPE, SHEET, THREE QUARTER, FAN FOLD, 57 X 77 IN

## (undated) DEVICE — CATHETER, URETHRAL, FOLEY, 2 WAY, BARDEX IC, 16 FR, 5 CC, SILICONE

## (undated) DEVICE — TROCAR, BLADELESS, THREADED, 15MM

## (undated) DEVICE — CARE KIT, LAPAROSCOPIC, ADVANCED

## (undated) DEVICE — APPLICATOR, CHLORAPREP, W/ORANGE TINT, 26ML

## (undated) DEVICE — IRRIGATION SET, CYSTOSCOPY, REGULATING CLAMP, STRAIGHT, 81 IN

## (undated) DEVICE — TROCAR, OPTICAL, BLADELESS, KII FIOS, 5 X 100MM, THREADED

## (undated) DEVICE — RETRIEVAL SYSTEM, MONARCH, 10MM DISP ENDOSCOPIC

## (undated) DEVICE — LIGASURE, L-HOOK 44CM SEALER/DIVIDER LAP, MARYLAND

## (undated) DEVICE — CLIP, ENDO APPLIER LIGAMAX 5MM

## (undated) DEVICE — STAPLER,  LINEAR ENDO 60MM RELOAD, GREEN, DISP

## (undated) DEVICE — TROCAR, OPTICAL, BLADELESS, 12MM, THREADED, 100MM LENGTH

## (undated) DEVICE — CATHETER, CHOLANGIOGRAM, 18 G X 11

## (undated) DEVICE — NEEDLE, EPIDURAL 17G X 4 1/2 PERIFIX

## (undated) DEVICE — GRASPER TIP, LAPCLINCH, DISP

## (undated) DEVICE — CATHETER, IV, ANGIOCATH, 14 G X 5.25 IN, FEP POLYMER

## (undated) DEVICE — TUBING SET, BIFURCATED, SMOKE EVAC, FILTERED, F/AIRSEAL

## (undated) DEVICE — APPLICATOR, ARISTA, FLEXITIP, XL, LF

## (undated) DEVICE — RELOAD, ENDOSTITCH, POLYSORB, 2-0, 48 IN, ES9, VIOLET, SULU

## (undated) DEVICE — DEVICE, SUTURE, ENDOSTITCH, 10 MM

## (undated) DEVICE — SLEEVE, VASO PRESS, CALF GARMENT, MEDIUM, GREEN

## (undated) DEVICE — HEMOSTAT, ARISTA, ABSORBABLE, 3 GRAMS

## (undated) DEVICE — RESERVOIR, DRAINAGE, WOUND, JACKSON-PRATT, 100 CC, SILICONE

## (undated) DEVICE — SYRINGE, 30 CC, LUER LOCK

## (undated) DEVICE — GRASPER TIP, MODIFIED TRADITIONAL, 22.6MM

## (undated) DEVICE — STAPLER, ECHELON 3000, 60MM LONG

## (undated) DEVICE — COVER PROBE, ULTRASOUND GUSSETED, W/ GEL, 5 X 48 IN, CLOSED END

## (undated) DEVICE — ELECTRODE, ELECTROSURGICAL, PENCIL, HAND CONTROL, BLADE, 10 FT CORD, LF

## (undated) DEVICE — SYRINGE, 50 CC, LUER LOCK